# Patient Record
Sex: FEMALE | Race: BLACK OR AFRICAN AMERICAN | Employment: FULL TIME | URBAN - METROPOLITAN AREA
[De-identification: names, ages, dates, MRNs, and addresses within clinical notes are randomized per-mention and may not be internally consistent; named-entity substitution may affect disease eponyms.]

---

## 2017-04-17 ENCOUNTER — OFFICE VISIT (OUTPATIENT)
Dept: PRIMARY CARE CLINIC | Age: 22
End: 2017-04-17

## 2017-04-17 VITALS
HEIGHT: 69 IN | BODY MASS INDEX: 38.95 KG/M2 | SYSTOLIC BLOOD PRESSURE: 118 MMHG | TEMPERATURE: 97.6 F | WEIGHT: 263 LBS | OXYGEN SATURATION: 98 % | DIASTOLIC BLOOD PRESSURE: 78 MMHG | HEART RATE: 96 BPM | RESPIRATION RATE: 12 BRPM

## 2017-04-17 DIAGNOSIS — N92.0 MENORRHAGIA WITH REGULAR CYCLE: ICD-10-CM

## 2017-04-17 DIAGNOSIS — Z00.00 PREVENTATIVE HEALTH CARE: ICD-10-CM

## 2017-04-17 DIAGNOSIS — J45.20 MILD INTERMITTENT ASTHMA WITHOUT COMPLICATION: ICD-10-CM

## 2017-04-17 DIAGNOSIS — J30.1 SEASONAL ALLERGIC RHINITIS DUE TO POLLEN: ICD-10-CM

## 2017-04-17 DIAGNOSIS — Z23 NEED FOR TDAP VACCINATION: ICD-10-CM

## 2017-04-17 DIAGNOSIS — F41.1 GENERALIZED ANXIETY DISORDER: Primary | ICD-10-CM

## 2017-04-17 DIAGNOSIS — Z23 NEED FOR PNEUMOCOCCAL VACCINATION: ICD-10-CM

## 2017-04-17 PROBLEM — L73.2 HIDRADENITIS SUPPURATIVA: Status: ACTIVE | Noted: 2017-04-17

## 2017-04-17 LAB
ALBUMIN SERPL-MCNC: 4.3 G/DL (ref 3.4–5)
ALP BLD-CCNC: 64 U/L (ref 40–129)
ALT SERPL-CCNC: 13 U/L (ref 10–40)
ANION GAP SERPL CALCULATED.3IONS-SCNC: 16 MMOL/L (ref 3–16)
AST SERPL-CCNC: 14 U/L (ref 15–37)
BASOPHILS ABSOLUTE: 0 K/UL (ref 0–0.2)
BASOPHILS RELATIVE PERCENT: 0.5 %
BILIRUB SERPL-MCNC: 0.8 MG/DL (ref 0–1)
BILIRUBIN DIRECT: <0.2 MG/DL (ref 0–0.3)
BILIRUBIN, INDIRECT: ABNORMAL MG/DL (ref 0–1)
BUN BLDV-MCNC: 9 MG/DL (ref 7–20)
CALCIUM SERPL-MCNC: 9.1 MG/DL (ref 8.3–10.6)
CHLORIDE BLD-SCNC: 105 MMOL/L (ref 99–110)
CHOLESTEROL, TOTAL: 155 MG/DL (ref 0–199)
CO2: 20 MMOL/L (ref 21–32)
CREAT SERPL-MCNC: <0.5 MG/DL (ref 0.6–1.1)
EOSINOPHILS ABSOLUTE: 0.2 K/UL (ref 0–0.6)
EOSINOPHILS RELATIVE PERCENT: 3.6 %
FERRITIN: 11.4 NG/ML (ref 15–150)
GFR AFRICAN AMERICAN: >60
GFR NON-AFRICAN AMERICAN: >60
GLUCOSE BLD-MCNC: 106 MG/DL (ref 70–99)
HCT VFR BLD CALC: 32.9 % (ref 36–48)
HDLC SERPL-MCNC: 48 MG/DL (ref 40–60)
HEMATOLOGY PATH CONSULT: NO
HEMOGLOBIN: 10 G/DL (ref 12–16)
IRON SATURATION: 6 % (ref 15–50)
IRON: 25 UG/DL (ref 37–145)
LDL CHOLESTEROL CALCULATED: 94 MG/DL
LYMPHOCYTES ABSOLUTE: 2.2 K/UL (ref 1–5.1)
LYMPHOCYTES RELATIVE PERCENT: 32.8 %
MCH RBC QN AUTO: 20.7 PG (ref 26–34)
MCHC RBC AUTO-ENTMCNC: 30.5 G/DL (ref 31–36)
MCV RBC AUTO: 67.9 FL (ref 80–100)
MONOCYTES ABSOLUTE: 0.5 K/UL (ref 0–1.3)
MONOCYTES RELATIVE PERCENT: 7.3 %
NEUTROPHILS ABSOLUTE: 3.7 K/UL (ref 1.7–7.7)
NEUTROPHILS RELATIVE PERCENT: 55.8 %
PDW BLD-RTO: 16.1 % (ref 12.4–15.4)
PHOSPHORUS: 3 MG/DL (ref 2.5–4.9)
PLATELET # BLD: 306 K/UL (ref 135–450)
PMV BLD AUTO: 8.8 FL (ref 5–10.5)
POTASSIUM SERPL-SCNC: 4.5 MMOL/L (ref 3.5–5.1)
RBC # BLD: 4.84 M/UL (ref 4–5.2)
SODIUM BLD-SCNC: 141 MMOL/L (ref 136–145)
TOTAL IRON BINDING CAPACITY: 442 UG/DL (ref 260–445)
TOTAL PROTEIN: 7.4 G/DL (ref 6.4–8.2)
TRIGL SERPL-MCNC: 64 MG/DL (ref 0–150)
TSH REFLEX: 1.88 UIU/ML (ref 0.27–4.2)
VLDLC SERPL CALC-MCNC: 13 MG/DL
WBC # BLD: 6.7 K/UL (ref 4–11)

## 2017-04-17 PROCEDURE — 90715 TDAP VACCINE 7 YRS/> IM: CPT | Performed by: INTERNAL MEDICINE

## 2017-04-17 PROCEDURE — 99385 PREV VISIT NEW AGE 18-39: CPT | Performed by: INTERNAL MEDICINE

## 2017-04-17 PROCEDURE — 90472 IMMUNIZATION ADMIN EACH ADD: CPT | Performed by: INTERNAL MEDICINE

## 2017-04-17 PROCEDURE — 90471 IMMUNIZATION ADMIN: CPT | Performed by: INTERNAL MEDICINE

## 2017-04-17 PROCEDURE — 90732 PPSV23 VACC 2 YRS+ SUBQ/IM: CPT | Performed by: INTERNAL MEDICINE

## 2017-04-17 RX ORDER — VENLAFAXINE 37.5 MG/1
37.5 TABLET ORAL DAILY
Qty: 90 TABLET | Refills: 1 | Status: SHIPPED | OUTPATIENT
Start: 2017-04-17 | End: 2017-10-22 | Stop reason: SDUPTHER

## 2017-04-17 RX ORDER — ALBUTEROL SULFATE 90 UG/1
2 AEROSOL, METERED RESPIRATORY (INHALATION) EVERY 6 HOURS PRN
Qty: 1 INHALER | Refills: 5 | Status: SHIPPED | OUTPATIENT
Start: 2017-04-17 | End: 2018-05-03

## 2017-04-17 RX ORDER — FLUTICASONE PROPIONATE 50 MCG
1 SPRAY, SUSPENSION (ML) NASAL DAILY
Qty: 1 BOTTLE | Refills: 3 | Status: SHIPPED | OUTPATIENT
Start: 2017-04-17 | End: 2017-06-19 | Stop reason: SDUPTHER

## 2017-04-17 RX ORDER — CETIRIZINE HYDROCHLORIDE 5 MG/1
5 TABLET ORAL DAILY
Qty: 30 TABLET | Refills: 3 | Status: SHIPPED | OUTPATIENT
Start: 2017-04-17 | End: 2018-05-03 | Stop reason: SDUPTHER

## 2017-04-17 RX ORDER — MONTELUKAST SODIUM 10 MG/1
10 TABLET ORAL NIGHTLY
Qty: 30 TABLET | Refills: 3 | Status: SHIPPED | OUTPATIENT
Start: 2017-04-17 | End: 2018-01-10 | Stop reason: SDUPTHER

## 2017-04-17 ASSESSMENT — PATIENT HEALTH QUESTIONNAIRE - PHQ9
2. FEELING DOWN, DEPRESSED OR HOPELESS: 0
1. LITTLE INTEREST OR PLEASURE IN DOING THINGS: 0
SUM OF ALL RESPONSES TO PHQ9 QUESTIONS 1 & 2: 0
SUM OF ALL RESPONSES TO PHQ QUESTIONS 1-9: 0

## 2017-04-17 ASSESSMENT — ENCOUNTER SYMPTOMS
CONSTIPATION: 0
NAUSEA: 0
COUGH: 1
SHORTNESS OF BREATH: 1
ABDOMINAL PAIN: 0
VOMITING: 0
DIARRHEA: 0

## 2017-04-18 ENCOUNTER — TELEPHONE (OUTPATIENT)
Dept: PRIMARY CARE CLINIC | Age: 22
End: 2017-04-18

## 2017-04-18 DIAGNOSIS — D50.0 IRON DEFICIENCY ANEMIA DUE TO CHRONIC BLOOD LOSS: Primary | ICD-10-CM

## 2017-04-18 LAB
ESTIMATED AVERAGE GLUCOSE: 119.8 MG/DL
HBA1C MFR BLD: 5.8 %
HIV-1 AND HIV-2 ANTIBODIES: NORMAL

## 2017-04-18 RX ORDER — PNV NO.95/FERROUS FUM/FOLIC AC 28MG-0.8MG
1 TABLET ORAL DAILY
Qty: 30 TABLET | Refills: 3 | Status: SHIPPED | OUTPATIENT
Start: 2017-04-18 | End: 2018-01-10 | Stop reason: SDUPTHER

## 2017-05-30 ENCOUNTER — TELEPHONE (OUTPATIENT)
Dept: PRIMARY CARE CLINIC | Age: 22
End: 2017-05-30

## 2017-05-30 ENCOUNTER — OFFICE VISIT (OUTPATIENT)
Dept: PRIMARY CARE CLINIC | Age: 22
End: 2017-05-30

## 2017-05-30 VITALS
DIASTOLIC BLOOD PRESSURE: 80 MMHG | WEIGHT: 264 LBS | OXYGEN SATURATION: 99 % | BODY MASS INDEX: 39.1 KG/M2 | HEART RATE: 72 BPM | TEMPERATURE: 98.7 F | HEIGHT: 69 IN | SYSTOLIC BLOOD PRESSURE: 132 MMHG | RESPIRATION RATE: 16 BRPM

## 2017-05-30 DIAGNOSIS — L73.2 HIDRADENITIS SUPPURATIVA: Primary | ICD-10-CM

## 2017-05-30 DIAGNOSIS — J45.30 MILD PERSISTENT ASTHMA WITHOUT COMPLICATION: ICD-10-CM

## 2017-05-30 PROCEDURE — 99213 OFFICE O/P EST LOW 20 MIN: CPT | Performed by: FAMILY MEDICINE

## 2017-05-30 RX ORDER — SULFAMETHOXAZOLE AND TRIMETHOPRIM 800; 160 MG/1; MG/1
1 TABLET ORAL 2 TIMES DAILY
Qty: 20 TABLET | Refills: 0 | Status: SHIPPED | OUTPATIENT
Start: 2017-05-30 | End: 2017-06-09

## 2018-01-10 ENCOUNTER — OFFICE VISIT (OUTPATIENT)
Dept: PRIMARY CARE CLINIC | Age: 23
End: 2018-01-10

## 2018-01-10 VITALS
SYSTOLIC BLOOD PRESSURE: 120 MMHG | HEIGHT: 69 IN | WEIGHT: 272 LBS | TEMPERATURE: 97.9 F | BODY MASS INDEX: 40.29 KG/M2 | DIASTOLIC BLOOD PRESSURE: 74 MMHG | OXYGEN SATURATION: 100 % | HEART RATE: 97 BPM

## 2018-01-10 DIAGNOSIS — D50.0 IRON DEFICIENCY ANEMIA DUE TO CHRONIC BLOOD LOSS: ICD-10-CM

## 2018-01-10 DIAGNOSIS — J30.1 CHRONIC SEASONAL ALLERGIC RHINITIS DUE TO POLLEN: ICD-10-CM

## 2018-01-10 DIAGNOSIS — Z23 FLU VACCINE NEED: ICD-10-CM

## 2018-01-10 DIAGNOSIS — Z00.00 PREVENTATIVE HEALTH CARE: ICD-10-CM

## 2018-01-10 DIAGNOSIS — Z00.00 PREVENTATIVE HEALTH CARE: Primary | ICD-10-CM

## 2018-01-10 DIAGNOSIS — F41.1 GENERALIZED ANXIETY DISORDER: ICD-10-CM

## 2018-01-10 DIAGNOSIS — Z12.4 CERVICAL CANCER SCREENING: ICD-10-CM

## 2018-01-10 DIAGNOSIS — G43.009 MIGRAINE WITHOUT AURA AND WITHOUT STATUS MIGRAINOSUS, NOT INTRACTABLE: ICD-10-CM

## 2018-01-10 LAB
ALBUMIN SERPL-MCNC: 4.3 G/DL (ref 3.4–5)
ALP BLD-CCNC: 70 U/L (ref 40–129)
ALT SERPL-CCNC: 22 U/L (ref 10–40)
ANION GAP SERPL CALCULATED.3IONS-SCNC: 12 MMOL/L (ref 3–16)
AST SERPL-CCNC: 18 U/L (ref 15–37)
BASOPHILS ABSOLUTE: 0 K/UL (ref 0–0.2)
BASOPHILS RELATIVE PERCENT: 0.4 %
BILIRUB SERPL-MCNC: 0.6 MG/DL (ref 0–1)
BILIRUBIN DIRECT: <0.2 MG/DL (ref 0–0.3)
BILIRUBIN, INDIRECT: NORMAL MG/DL (ref 0–1)
BUN BLDV-MCNC: 9 MG/DL (ref 7–20)
CALCIUM SERPL-MCNC: 9.6 MG/DL (ref 8.3–10.6)
CHLORIDE BLD-SCNC: 98 MMOL/L (ref 99–110)
CO2: 27 MMOL/L (ref 21–32)
CREAT SERPL-MCNC: 0.5 MG/DL (ref 0.6–1.1)
EOSINOPHILS ABSOLUTE: 0.2 K/UL (ref 0–0.6)
EOSINOPHILS RELATIVE PERCENT: 1.9 %
FERRITIN: 12.9 NG/ML (ref 15–150)
GFR AFRICAN AMERICAN: >60
GFR NON-AFRICAN AMERICAN: >60
GLUCOSE BLD-MCNC: 85 MG/DL (ref 70–99)
HCT VFR BLD CALC: 31.8 % (ref 36–48)
HEMATOLOGY PATH CONSULT: NO
HEMOGLOBIN: 9.5 G/DL (ref 12–16)
IRON SATURATION: 7 % (ref 15–50)
IRON: 33 UG/DL (ref 37–145)
LYMPHOCYTES ABSOLUTE: 2.4 K/UL (ref 1–5.1)
LYMPHOCYTES RELATIVE PERCENT: 27 %
MCH RBC QN AUTO: 19.3 PG (ref 26–34)
MCHC RBC AUTO-ENTMCNC: 30 G/DL (ref 31–36)
MCV RBC AUTO: 64.2 FL (ref 80–100)
MONOCYTES ABSOLUTE: 0.6 K/UL (ref 0–1.3)
MONOCYTES RELATIVE PERCENT: 7.2 %
NEUTROPHILS ABSOLUTE: 5.6 K/UL (ref 1.7–7.7)
NEUTROPHILS RELATIVE PERCENT: 63.5 %
PDW BLD-RTO: 17.6 % (ref 12.4–15.4)
PHOSPHORUS: 2.8 MG/DL (ref 2.5–4.9)
PLATELET # BLD: 348 K/UL (ref 135–450)
PMV BLD AUTO: 9.2 FL (ref 5–10.5)
POTASSIUM SERPL-SCNC: 4.3 MMOL/L (ref 3.5–5.1)
RBC # BLD: 4.95 M/UL (ref 4–5.2)
SODIUM BLD-SCNC: 137 MMOL/L (ref 136–145)
TOTAL IRON BINDING CAPACITY: 477 UG/DL (ref 260–445)
TOTAL PROTEIN: 7.8 G/DL (ref 6.4–8.2)
TSH REFLEX: 1.61 UIU/ML (ref 0.27–4.2)
WBC # BLD: 8.8 K/UL (ref 4–11)

## 2018-01-10 PROCEDURE — 99395 PREV VISIT EST AGE 18-39: CPT | Performed by: INTERNAL MEDICINE

## 2018-01-10 PROCEDURE — 90630 INFLUENZA, QUADV, 18-64 YRS, ID, PF, MICRO INJ, 0.1ML (FLUZONE QUADV, PF): CPT | Performed by: INTERNAL MEDICINE

## 2018-01-10 PROCEDURE — 90471 IMMUNIZATION ADMIN: CPT | Performed by: INTERNAL MEDICINE

## 2018-01-10 RX ORDER — MONTELUKAST SODIUM 10 MG/1
10 TABLET ORAL NIGHTLY
Qty: 30 TABLET | Refills: 3 | Status: SHIPPED | OUTPATIENT
Start: 2018-01-10 | End: 2018-05-03 | Stop reason: SDUPTHER

## 2018-01-10 RX ORDER — FLUTICASONE PROPIONATE 50 MCG
1 SPRAY, SUSPENSION (ML) NASAL DAILY
Qty: 1 BOTTLE | Refills: 5 | Status: SHIPPED | OUTPATIENT
Start: 2018-01-10 | End: 2018-05-03 | Stop reason: SDUPTHER

## 2018-01-10 RX ORDER — VENLAFAXINE HYDROCHLORIDE 37.5 MG/1
37.5 CAPSULE, EXTENDED RELEASE ORAL DAILY
Qty: 90 CAPSULE | Refills: 5 | Status: SHIPPED | OUTPATIENT
Start: 2018-01-10 | End: 2018-05-03

## 2018-01-10 RX ORDER — IBUPROFEN 800 MG/1
800 TABLET ORAL EVERY 8 HOURS PRN
Qty: 20 TABLET | Refills: 0 | Status: SHIPPED | OUTPATIENT
Start: 2018-01-10 | End: 2019-02-06

## 2018-01-10 RX ORDER — PNV NO.95/FERROUS FUM/FOLIC AC 28MG-0.8MG
1 TABLET ORAL DAILY
Qty: 90 TABLET | Refills: 3 | Status: SHIPPED | OUTPATIENT
Start: 2018-01-10 | End: 2018-05-03 | Stop reason: SDUPTHER

## 2018-01-10 RX ORDER — SUMATRIPTAN 25 MG/1
TABLET, FILM COATED ORAL
Qty: 30 TABLET | Refills: 3 | Status: SHIPPED | OUTPATIENT
Start: 2018-01-10 | End: 2018-12-12

## 2018-01-10 NOTE — PATIENT INSTRUCTIONS
Patient Education        Migraine Headache: Care Instructions  Your Care Instructions  Migraines are painful, throbbing headaches that often start on one side of the head. They may cause nausea and vomiting and make you sensitive to light, sound, or smell. Without treatment, migraines can last from 4 hours to a few days. Medicines can help prevent migraines or stop them after they have started. Your doctor can help you find which ones work best for you. Follow-up care is a key part of your treatment and safety. Be sure to make and go to all appointments, and call your doctor if you are having problems. It's also a good idea to know your test results and keep a list of the medicines you take. How can you care for yourself at home? · Do not drive if you have taken a prescription pain medicine. · Rest in a quiet, dark room until your headache is gone. Close your eyes, and try to relax or go to sleep. Don't watch TV or read. · Put a cold, moist cloth or cold pack on the painful area for 10 to 20 minutes at a time. Put a thin cloth between the cold pack and your skin. · Use a warm, moist towel or a heating pad set on low to relax tight shoulder and neck muscles. · Have someone gently massage your neck and shoulders. · Take your medicines exactly as prescribed. Call your doctor if you think you are having a problem with your medicine. You will get more details on the specific medicines your doctor prescribes. · Be careful not to take pain medicine more often than the instructions allow. You could get worse or more frequent headaches when the medicine wears off. To prevent migraines  · Keep a headache diary so you can figure out what triggers your headaches. Avoiding triggers may help you prevent headaches. Record when each headache began, how long it lasted, and what the pain was like.  (Was it throbbing, aching, stabbing, or dull?) Write down any other symptoms you had with the headache, such as nausea, flashing lights or dark spots, or sensitivity to bright light or loud noise. Note if the headache occurred near your period. List anything that might have triggered the headache. Triggers may include certain foods (chocolate, cheese, wine) or odors, smoke, bright light, stress, or lack of sleep. · If your doctor has prescribed medicine for your migraines, take it as directed. You may have medicine that you take only when you get a migraine and medicine that you take all the time to help prevent migraines. ¨ If your doctor has prescribed medicine for when you get a headache, take it at the first sign of a migraine, unless your doctor has given you other instructions. ¨ If your doctor has prescribed medicine to prevent migraines, take it exactly as prescribed. Call your doctor if you think you are having a problem with your medicine. · Find healthy ways to deal with stress. Migraines are most common during or right after stressful times. Take time to relax before and after you do something that has caused a migraine in the past.  · Try to keep your muscles relaxed by keeping good posture. Check your jaw, face, neck, and shoulder muscles for tension. Try to relax them. When you sit at a desk, change positions often. And make sure to stretch for 30 seconds each hour. · Get plenty of sleep and exercise. · Eat meals on a regular schedule. Avoid foods and drinks that often trigger migraines. These include chocolate, alcohol (especially red wine and port), aspartame, monosodium glutamate (MSG), and some additives found in foods (such as hot dogs, shaver, cold cuts, aged cheeses, and pickled foods). · Limit caffeine. Don't drink too much coffee, tea, or soda. But don't quit caffeine suddenly. That can also give you migraines. · Do not smoke or allow others to smoke around you. If you need help quitting, talk to your doctor about stop-smoking programs and medicines.  These can increase your chances of quitting for Others have them as often as several times a month. The goal of treatment is to reduce the number of migraines you have and relieve your symptoms. Even with treatment, you may continue to have migraines. You play an important role in dealing with your headaches. Work on avoiding things that seem to trigger your migraines. When you feel a headache coming on, act quickly to stop it before it gets worse. Follow-up care is a key part of your treatment and safety. Be sure to make and go to all appointments, and call your doctor if you are having problems. It's also a good idea to know your test results and keep a list of the medicines you take. How can you care for yourself at home? · Do not drive if you have taken a prescription pain medicine. · Rest in a quiet, dark room until your headache is gone. Close your eyes and try to relax or go to sleep. Do not watch TV or read. · Put a cold, moist cloth or cold pack on the painful area for 10 to 20 minutes at a time. Put a thin cloth between the cold pack and your skin. · Have someone gently massage your neck and shoulders. · Take your medicines exactly as prescribed. Call your doctor if you think you are having a problem with your medicine. You will get more details on the specific medicines your doctor prescribes. To prevent migraines  · Keep a headache diary so you can figure out what triggers your headaches. Avoiding triggers may help you prevent headaches. Record when each headache began, how long it lasted, and what the pain was like. Use words like throbbing, aching, stabbing, or dull. Write down any other symptoms you had with the headache. These may include nausea, flashing lights or dark spots, or sensitivity to bright light or loud noise. Note if the headache occurred near your period. List anything that might have triggered the headache.  Triggers may include certain foods (chocolate, cheese, wine) or odors, smoke, bright light, stress, or lack of

## 2018-01-10 NOTE — PROGRESS NOTES
Chief Complaint:   Chief Complaint   Patient presents with    Annual Exam     no problems at this time         HPI:  Flavia Morrell is a 20 y.o. female, with a history of asthma, prediabetes hidradenitis  suppurativa,  anxiety, , who presents for an annual physical examination. Weight Gain:  Pt is worried about recent weight gain. She has gained 12 lb over the past few months. She recently joined [employer]. The weight gain happened while completing her last semester in college. She is now studying for her GRE test, planning to attend graduate school in [employer]. Anxiety: Pt feels that her anxiety is currently stable. She only gets worried about major stressors now. She is able to stay calm dealing with smaller issues. Pt  wants to stay at current dose of effexor. Hidradenitis: Ms. Maria Fernanda Williamson has had multiple flares of hidradenitis since last visit. She has require for medical visits to the ED or urgent care for axillary abscess. She now has a referral to a [facility] surgeon to see what can be done for long term treatment of the condition. Anemia:  Pt is not taking iron pills. She is still having regular, but heavy periods. Pt needs a new ob gyn. Miragines: Pt is also complains of chronic right sided headaches since December. She has associated nausea and light sensitivity with the headaches. Pt also does snore at bedtime.        Vaccinations   Flu shot: wants       Past Medical History:   Diagnosis Date    Anxiety     Anxiety     Asthma     Diabetes mellitus ([address]     diet controlled, not fully diabetic       Past Surgical History:   Procedure Laterality Date    TONSILLECTOMY  1998          Family History   Problem Relation Age of Onset    Diabetes Other     Hypertension Other     Other Other      kidney disease         No Known Allergies    Social History     Social History    Marital status: Single     Spouse name: N/A    Number of children: N/A    Years of education: N/A Future  - Ferrous Sulfate (IRON) 325 (65 Fe) MG TABS; Take 1 tablet by mouth daily  Dispense: 90 tablet; Refill: 3    3. Generalized anxiety disorder    - venlafaxine (EFFEXOR XR) 37.5 MG extended release capsule; Take 1 capsule by mouth daily  Dispense: 90 capsule; Refill: 5    4. Chronic seasonal allergic rhinitis due to pollen    - fluticasone (FLONASE) 50 MCG/ACT nasal spray; 1 spray by Nasal route daily  Dispense: 1 Bottle; Refill: 5  - montelukast (SINGULAIR) 10 MG tablet; Take 1 tablet by mouth nightly  Dispense: 30 tablet; Refill: 3    5. Migraine without aura and without status migrainosus, not intractable    - ibuprofen (ADVIL;MOTRIN) 800 MG tablet; Take 1 tablet by mouth every 8 hours as needed for Pain  Dispense: 20 tablet; Refill: 0  - [facility] - Delta Flake to rule out sleep apnea   - SUMAtriptan (IMITREX) 25 MG tablet; Take one pill for headache. Take second pill if headache does not resolve in 1 hour. Max of 2 pills per day  Dispense: 30 tablet; Refill: 3    6. Cervical cancer screening    - [facility] Antwan Castle MD      7.  Flu vaccine need  -completed at today's visit  - Skip Monsivais, 18-64 YRS, ID, PF, MICRO INJ, 0.1ML (FLUZONE QUADV, PF)

## 2018-01-11 LAB
ESTIMATED AVERAGE GLUCOSE: 114 MG/DL
HBA1C MFR BLD: 5.6 %

## 2018-01-22 PROBLEM — G43.009 MIGRAINE WITHOUT AURA AND WITHOUT STATUS MIGRAINOSUS, NOT INTRACTABLE: Status: ACTIVE | Noted: 2018-01-22

## 2018-01-22 ASSESSMENT — ENCOUNTER SYMPTOMS
VOMITING: 0
ABDOMINAL PAIN: 0
NAUSEA: 0
CONSTIPATION: 0
BACK PAIN: 0
SORE THROAT: 0
SHORTNESS OF BREATH: 0
COUGH: 0
DIARRHEA: 0

## 2018-01-25 ENCOUNTER — OFFICE VISIT (OUTPATIENT)
Dept: GYNECOLOGY | Age: 23
End: 2018-01-25

## 2018-01-25 ENCOUNTER — OFFICE VISIT (OUTPATIENT)
Dept: SLEEP MEDICINE | Age: 23
End: 2018-01-25

## 2018-01-25 VITALS
DIASTOLIC BLOOD PRESSURE: 72 MMHG | BODY MASS INDEX: 40.29 KG/M2 | HEIGHT: 69 IN | WEIGHT: 272 LBS | SYSTOLIC BLOOD PRESSURE: 120 MMHG

## 2018-01-25 VITALS
DIASTOLIC BLOOD PRESSURE: 80 MMHG | OXYGEN SATURATION: 98 % | BODY MASS INDEX: 40.7 KG/M2 | RESPIRATION RATE: 24 BRPM | HEART RATE: 110 BPM | WEIGHT: 274.8 LBS | TEMPERATURE: 98.4 F | HEIGHT: 69 IN | SYSTOLIC BLOOD PRESSURE: 120 MMHG

## 2018-01-25 DIAGNOSIS — G47.33 OSA (OBSTRUCTIVE SLEEP APNEA): Primary | ICD-10-CM

## 2018-01-25 DIAGNOSIS — N92.0 MENORRHAGIA WITH REGULAR CYCLE: ICD-10-CM

## 2018-01-25 DIAGNOSIS — Z01.419 WOMEN'S ANNUAL ROUTINE GYNECOLOGICAL EXAMINATION: Primary | ICD-10-CM

## 2018-01-25 DIAGNOSIS — N76.0 BACTERIAL VAGINOSIS: Primary | ICD-10-CM

## 2018-01-25 DIAGNOSIS — B96.89 BACTERIAL VAGINOSIS: Primary | ICD-10-CM

## 2018-01-25 DIAGNOSIS — Z11.8 SCREENING FOR CHLAMYDIAL DISEASE: ICD-10-CM

## 2018-01-25 DIAGNOSIS — N94.6 DYSMENORRHEA: ICD-10-CM

## 2018-01-25 DIAGNOSIS — Z12.4 SCREENING FOR CERVICAL CANCER: ICD-10-CM

## 2018-01-25 DIAGNOSIS — Z76.89 ENCOUNTER TO ESTABLISH CARE: ICD-10-CM

## 2018-01-25 DIAGNOSIS — N89.8 VAGINAL DISCHARGE: ICD-10-CM

## 2018-01-25 LAB
BACTERIA WET PREP: ABNORMAL
CLUE CELLS: ABNORMAL
EPITHELIAL CELLS WET PREP: ABNORMAL
RBC WET PREP: ABNORMAL
SOURCE WET PREP: ABNORMAL
TRICHOMONAS PREP: ABNORMAL
WBC WET PREP: ABNORMAL
YEAST WET PREP: ABNORMAL

## 2018-01-25 PROCEDURE — 1036F TOBACCO NON-USER: CPT | Performed by: PSYCHIATRY & NEUROLOGY

## 2018-01-25 PROCEDURE — G8417 CALC BMI ABV UP PARAM F/U: HCPCS | Performed by: PSYCHIATRY & NEUROLOGY

## 2018-01-25 PROCEDURE — 99204 OFFICE O/P NEW MOD 45 MIN: CPT | Performed by: PSYCHIATRY & NEUROLOGY

## 2018-01-25 PROCEDURE — 99385 PREV VISIT NEW AGE 18-39: CPT | Performed by: NURSE PRACTITIONER

## 2018-01-25 PROCEDURE — G8427 DOCREV CUR MEDS BY ELIG CLIN: HCPCS | Performed by: PSYCHIATRY & NEUROLOGY

## 2018-01-25 PROCEDURE — G8484 FLU IMMUNIZE NO ADMIN: HCPCS | Performed by: PSYCHIATRY & NEUROLOGY

## 2018-01-25 RX ORDER — METRONIDAZOLE 7.5 MG/G
GEL VAGINAL
Qty: 1 TUBE | Refills: 0 | Status: SHIPPED | OUTPATIENT
Start: 2018-01-25 | End: 2018-05-03

## 2018-01-25 ASSESSMENT — ENCOUNTER SYMPTOMS
CHOKING: 1
EYES NEGATIVE: 1
ALLERGIC/IMMUNOLOGIC NEGATIVE: 1
GASTROINTESTINAL NEGATIVE: 1

## 2018-01-25 ASSESSMENT — SLEEP AND FATIGUE QUESTIONNAIRES
ESS TOTAL SCORE: 11
NECK CIRCUMFERENCE (INCHES): 17
HOW LIKELY ARE YOU TO NOD OFF OR FALL ASLEEP IN A CAR, WHILE STOPPED FOR A FEW MINUTES IN TRAFFIC: 0
HOW LIKELY ARE YOU TO NOD OFF OR FALL ASLEEP WHILE SITTING AND READING: 1
HOW LIKELY ARE YOU TO NOD OFF OR FALL ASLEEP WHILE SITTING AND TALKING TO SOMEONE: 0
HOW LIKELY ARE YOU TO NOD OFF OR FALL ASLEEP WHILE WATCHING TV: 2
HOW LIKELY ARE YOU TO NOD OFF OR FALL ASLEEP WHILE SITTING QUIETLY AFTER LUNCH WITHOUT ALCOHOL: 1
HOW LIKELY ARE YOU TO NOD OFF OR FALL ASLEEP WHILE LYING DOWN TO REST IN THE AFTERNOON WHEN CIRCUMSTANCES PERMIT: 3
HOW LIKELY ARE YOU TO NOD OFF OR FALL ASLEEP WHILE SITTING INACTIVE IN A PUBLIC PLACE: 2
HOW LIKELY ARE YOU TO NOD OFF OR FALL ASLEEP WHEN YOU ARE A PASSENGER IN A CAR FOR AN HOUR WITHOUT A BREAK: 2

## 2018-01-25 NOTE — PATIENT INSTRUCTIONS
Patient Education        Well Visit, Ages 25 to 48: Care Instructions  Your Care Instructions    Physical exams can help you stay healthy. Your doctor has checked your overall health and may have suggested ways to take good care of yourself. He or she also may have recommended tests. At home, you can help prevent illness with healthy eating, regular exercise, and other steps. Follow-up care is a key part of your treatment and safety. Be sure to make and go to all appointments, and call your doctor if you are having problems. It's also a good idea to know your test results and keep a list of the medicines you take. How can you care for yourself at home? · Reach and stay at a healthy weight. This will lower your risk for many problems, such as obesity, diabetes, heart disease, and high blood pressure. · Get at least 30 minutes of physical activity on most days of the week. Walking is a good choice. You also may want to do other activities, such as running, swimming, cycling, or playing tennis or team sports. Discuss any changes in your exercise program with your doctor. · Do not smoke or allow others to smoke around you. If you need help quitting, talk to your doctor about stop-smoking programs and medicines. These can increase your chances of quitting for good. · Talk to your doctor about whether you have any risk factors for sexually transmitted infections (STIs). Having one sex partner (who does not have STIs and does not have sex with anyone else) is a good way to avoid these infections. · Use birth control if you do not want to have children at this time. Talk with your doctor about the choices available and what might be best for you. · Protect your skin from too much sun. When you're outdoors from 10 a.m. to 4 p.m., stay in the shade or cover up with clothing and a hat with a wide brim. Wear sunglasses that block UV rays.  Even when it's cloudy, put broad-spectrum sunscreen (SPF 30 or higher) on any information. Patient Education        Learning About Physical Activity  What is physical activity? Physical activity is any kind of activity that gets your body moving. The types of physical activity that can help you get fit and stay healthy include:  · Aerobic or \"cardio\" activities that make your heart beat faster and make you breathe harder, such as brisk walking, riding a bike, or running. Aerobic activities strengthen your heart and lungs and build up your endurance. · Strength training activities that make your muscles work against, or \"resist,\" something, such as lifting weights or doing push-ups. These activities help tone and strengthen your muscles. · Stretches that allow you to move your joints and muscles through their full range of motion. Stretching helps you be more flexible and avoid injury. What are the benefits of physical activity? Being active is one of the best things you can do to get fit and stay healthy. It helps you to:  · Feel stronger and have more energy to do all the things you like to do. · Focus better at school or work and perform better in sports. · Feel, think, and sleep better. · Reach and stay at a healthy weight. · Lose fat and build lean muscle. · Lower your risk for serious health problems. · Keep your bones, muscles, and joints strong. Being fit lets you do more physical activity. And it lets you work out harder without as much effort. How can you make physical activity part of your life? Get at least 30 minutes of exercise on most days of the week. Walking is a good choice. You also may want to do other activities, such as running, swimming, cycling, or playing tennis or team sports. Pick activities that you like-ones that make your heart beat faster, your muscles stronger, and your muscles and joints more flexible. If you find more than one thing you like doing, do them all. You don't have to do the same thing every day.   Get your heart pumping every day. Any activity that makes your heart beat faster and keeps it at that rate for a while counts. Here are some great ways to get your heart beating faster:  · Go for a brisk walk, run, or bike ride. · Go for a hike or swim. · Go in-line skating. · Play a game of touch football, basketball, or soccer. · Ride a bike. · Play tennis or racquetball. · Climb stairs. Even some household chores can be aerobic-just do them at a faster pace. Vacuuming, raking or mowing the lawn, sweeping the garage, and washing and waxing the car all can help get your heart rate up. Strengthen your muscles during the week. You don't have to lift heavy weights or grow big, bulky muscles to get stronger. Doing a few simple activities that make your muscles work against, or \"resist,\" something can help you get stronger. For example, you can:  · Do push-ups or sit-ups, which use your own body weight as resistance. · Lift weights or dumbbells or use stretch bands at home or in a gym or community center. Stretch your muscles often. Stretching will help you as you become more active. It can help you stay flexible, loosen tight muscles, and avoid injury. It can also help improve your balance and posture and can be a great way to relax. Be sure to stretch the muscles you'll be using when you work out. It's best to warm your muscles slightly before you stretch them. Walk or do some other light aerobic activity for a few minutes, and then start stretching. When you stretch your muscles:  · Do it slowly. Stretching is not about going fast or making sudden movements. · Don't push or bounce during a stretch. · Hold each stretch for at least 15 to 30 seconds, if you can. You should feel a stretch in the muscle, but not pain. · Breathe out as you do the stretch. Then breathe in as you hold the stretch. Don't hold your breath. If you're worried about how more activity might affect your health, have a checkup before you start.  Follow any special advice your doctor gives you for getting a smart start. Where can you learn more? Go to https://chpepiceweb.[email or web address] org and sign in to your [employer] account. Enter H704 in the [facility] box to learn more about \"Learning About Physical Activity. \"     If you do not have an account, please click on the \"Sign Up Now\" link. Current as of: March 13, 2017  Content Version: 11.5  © 3963-5518 [employer]. Care instructions adapted under license by [employer]. If you have questions about a medical condition or this instruction, always ask your healthcare professional. [address] any warranty or liability for your use of this information. Patient Education        Painful Menstrual Cramps: Care Instructions  Your Care Instructions    Painful menstrual cramps are very common. Many women go to the doctor because of bad cramps when they get their period. You may have cramps in your back, thighs, and belly. You may also have diarrhea, constipation, or nausea. Some women also get dizzy. Pain medicine and home treatment can help you feel better. Follow-up care is a key part of your treatment and safety. Be sure to make and go to all appointments, and call your doctor if you are having problems. It's also a good idea to know your test results and keep a list of the medicines you take. How can you care for yourself at home? · Take anti-inflammatory medicines for pain. Ibuprofen (Advil, Motrin) and naproxen (Aleve) usually work better than aspirin. ¨ Be safe with medicines. Talk to your doctor or pharmacist before you take any of these medicines. They may not be safe if you take other medicines or have other health problems. ¨ Start taking the recommended dose of pain medicine as soon as you start to feel pain. Or you can start on the day before your period. Keep taking the medicine for as many days as you have cramps.   ¨ If anti-inflammatory medicines need to take iron with some food to avoid an upset stomach. ¨ Vitamin C (from food or pills) helps your body absorb iron. Try taking iron pills with a glass of orange juice or other citrus fruit juice. ¨ Do not take antacids or drink milk or caffeine drinks (such as coffee, tea, or cola) at the same time or within 2 hours of the time that you take your iron. They can make it hard for your body to absorb the iron. ¨ Iron pills may cause stomach problems, such as heartburn, nausea, diarrhea, constipation, and cramps. Be sure to drink plenty of fluids, and include fruits, vegetables, and fiber in your diet each day. ¨ If you forget to take an iron pill, do not take a double dose of iron the next time you take a pill. ¨ Keep iron pills out of the reach of small children. An overdose of iron can be very dangerous. When should you call for help? Call 911 anytime you think you may need emergency care. For example, call if:  ? · You passed out (lost consciousness). ?Call your doctor now or seek immediate medical care if:  ? · You have new or worse belly or pelvic pain. ? · You have severe vaginal bleeding. ? · You feel dizzy or lightheaded, or you feel like you may faint. ? Watch closely for changes in your health, and be sure to contact your doctor if:  ? · You think you may be pregnant. ? · Your bleeding gets worse. ? · You do not get better as expected. Where can you learn more? Go to https://[employer]ny.[employer]Maestro. org and sign in to your [employer] account. Enter F477 in the [facility] box to learn more about \"Heavy Menstrual Periods: Care Instructions. \"     If you do not have an account, please click on the \"Sign Up Now\" link. Current as of: October 13, 2016  Content Version: 11.5  © 8005-2011 Healthwise, Incorporated. Care instructions adapted under license by [facility] ([facility]).  If you have questions about a medical condition or this instruction, always ask your healthcare professional. [address] any warranty or liability for your use of this information. Patient Education        Pelvic Ultrasound for Women: About This Test  What is it? A pelvic ultrasound test uses sound waves to make a picture of the inside of the lower belly (pelvis). It allows your doctor to see your bladder, cervix, uterus, fallopian tubes, and ovaries. The sound waves create a picture on a video monitor. The test can be done in two ways:  · Transabdominal. A small handheld device (transducer) is passed back and forth over your lower belly. · Transvaginal. A thin, lubricated transducer is placed in your vagina. Why is this test done? A pelvic ultrasound test is done to:  · Find the cause of urinary problems. · Find out what's causing pelvic pain. · Look for causes of vaginal bleeding and menstrual problems. · Check for growths or masses like ovarian cysts or uterine fibroids. · See if a fertilized egg is growing outside the uterus. This is called a tubal pregnancy. · Confirm the stage of a pregnancy and check the baby's heartbeat. · Look for the correct placement of an intrauterine device (IUD). How can you prepare for the test?  · If you are having a transabdominal ultrasound, your doctor will ask you to drink 4 to 6 glasses of juice or water about an hour before the test. This will fill your bladder. If you can't fill your bladder, it can be filled with water through a thin, flexible tube (catheter). · If you are having both transabdominal and transvaginal ultrasounds done, you'll start with a full bladder. You will be asked to empty it before the transvaginal ultrasound. What happens before the test?  · You will need to remove any jewelry that might be in the way of the ultrasound.   · You will need to take off most of your clothes below the waist. You'll be given a gown to wear during the test.  What happens during the test?  For a transabdominal 6666-3930 Healthwise, Incorporated. Care instructions adapted under license by [facility] ([facility]). If you have questions about a medical condition or this instruction, always ask your healthcare professional. [address] any warranty or liability for your use of this information.

## 2018-01-25 NOTE — PROGRESS NOTES
yes  Exercise: no regular exercise but is active  Social History   Substance Use Topics    Smoking status: Never Smoker    Smokeless tobacco: Never Used    Alcohol use No      History   Sexual Activity    Sexual activity: Not Currently    Partners: Male     Hx of STD: no     Health Maintenance   Topic Date Due    Chlamydia screen  09/01/2015    Cervical cancer screen  03/09/2020    DTaP/Tdap/Td vaccine (2 - Td) 04/17/2027    Flu vaccine  Completed    Pneumococcal med risk  Completed    HIV screen  Completed       Assessment/Plan:  1. Encounter to establish care  2. Women's annual routine gynecological examination  India Schmitt was seen today as a new patient for an annual gynecological exam.    - Healthy diet  - Exercise  - Self breast exams    3. Screening for cervical cancer  Continue annual screenings. - PAP SMEAR    4. Screening for chlamydial disease  Annual screening completed today. Patient declines full STD work-up. Understands that if DNA probe is positive that it is recommended that she be screened for HIV, Hepatitis, and Syphilis.    - C.trachomatis N.gonorrhoeae DNA, Thin Prep    5. Dysmenorrhea  6. Menorrhagia with regular cycle  Several year H/O symptoms. Declines OCP and may not be a candidate due to migraines. Discussed starting on Micronor and patient continues to decline as she reports that she would prefer to not take medication. Patient is anemic and is likely related to heavy menses- reports taking iron. Pelvis US to rule out uterine fibroids. Possible endometriosis- if work-up is inconclusive may need scope. - Ultrasound pelvis complete transvaginal non OB; Future    7. Vaginal discharge  Moderate amount of thin, white, non-adherent, and nonmalodorous discharge noted on exam. Normal discharge vs BV. Patient is asymptomatic other than pelvic pain. Cultures pending; treatment deferred until resulted.     - Wet prep, genital  - Culture, Genital     Return in about 1 year (around

## 2018-01-25 NOTE — PATIENT INSTRUCTIONS
problems, such as a stuffy nose, caused by a cold or allergies. · Try a continuous positive airway pressure (CPAP) breathing machine if your doctor recommends it. The machine keeps your airway open when you sleep. · If CPAP does not work for you, ask your doctor if you can try other breathing machines. A bilevel positive airway pressure machine uses one type of air pressure for breathing in and another type for breathing out. Another device raises or lowers air pressure as needed while you breathe. · Talk to your doctor if:  ¨ Your nose feels dry or bleeds when you use one of these machines. You may need to increase moisture in the air. A humidifier may help. ¨ Your nose is runny or stuffy from using a breathing machine. Decongestants or a corticosteroid nasal spray may help. ¨ You are sleepy during the day and it gets in the way of the normal things you do. Do not drive when you are drowsy. When should you call for help? Watch closely for changes in your health, and be sure to contact your doctor if:  ? · You still have sleep apnea even though you have made lifestyle changes. ? · You are thinking of trying a device such as CPAP. ? · You are having problems using a CPAP or similar machine. Where can you learn more? Go to [email or web address] org and sign in to your TheCommentor account. Enter E934 in the [facility] box to learn more about \"Sleep Apnea: Care Instructions. \"     If you do not have an account, please click on the \"Sign Up Now\" link. Current as of: May 12, 2017  Content Version: 11.5  © 6639-5040 Healthwise, Incorporated. Care instructions adapted under license by [facility] ([facility]). If you have questions about a medical condition or this instruction, always ask your healthcare professional. [place] [address] any warranty or liability for your use of this information.

## 2018-01-27 LAB
GENITAL CULTURE, ROUTINE: ABNORMAL
GENITAL CULTURE, ROUTINE: ABNORMAL
ORGANISM: ABNORMAL

## 2018-01-29 ENCOUNTER — TELEPHONE (OUTPATIENT)
Dept: GYNECOLOGY | Age: 23
End: 2018-01-29

## 2018-01-30 RX ORDER — FLUCONAZOLE 150 MG/1
TABLET ORAL
Qty: 1 TABLET | Refills: 1 | Status: SHIPPED | OUTPATIENT
Start: 2018-01-30 | End: 2018-09-19

## 2018-02-02 LAB
C. TRACHOMATIS DNA,THIN PREP: NEGATIVE
N. GONORRHOEAE DNA, THIN PREP: NEGATIVE

## 2018-03-01 ENCOUNTER — HOSPITAL ENCOUNTER (OUTPATIENT)
Dept: ULTRASOUND IMAGING | Age: 23
Discharge: HOME | End: 2018-03-01
Attending: NURSE PRACTITIONER | Admitting: NURSE PRACTITIONER

## 2018-03-01 DIAGNOSIS — N94.6 DYSMENORRHEA: ICD-10-CM

## 2018-03-01 DIAGNOSIS — N92.0 MENORRHAGIA WITH REGULAR CYCLE: ICD-10-CM

## 2018-03-12 ENCOUNTER — TELEPHONE (OUTPATIENT)
Dept: GYNECOLOGY | Age: 23
End: 2018-03-12

## 2018-03-12 NOTE — TELEPHONE ENCOUNTER
Patient states that she had a pelvic US completed that had been ordered by NP. She is waiting on results. Patient had this preformed on 3/1/18. There are no notes on results from provider. Routing to NP to review.

## 2018-04-03 ENCOUNTER — HOSPITAL ENCOUNTER (OUTPATIENT)
Dept: OTHER | Age: 23
Discharge: HOME | End: 2018-04-04
Attending: PSYCHIATRY & NEUROLOGY | Admitting: PSYCHIATRY & NEUROLOGY

## 2018-04-20 DIAGNOSIS — F41.1 GENERALIZED ANXIETY DISORDER: ICD-10-CM

## 2018-04-20 RX ORDER — VENLAFAXINE HYDROCHLORIDE 37.5 MG/1
CAPSULE, EXTENDED RELEASE ORAL
Qty: 90 CAPSULE | Refills: 0 | Status: SHIPPED | OUTPATIENT
Start: 2018-04-20 | End: 2019-05-23 | Stop reason: SDUPTHER

## 2018-05-03 ENCOUNTER — OFFICE VISIT (OUTPATIENT)
Dept: PRIMARY CARE CLINIC | Age: 23
End: 2018-05-03

## 2018-05-03 VITALS
SYSTOLIC BLOOD PRESSURE: 108 MMHG | TEMPERATURE: 98 F | WEIGHT: 274 LBS | OXYGEN SATURATION: 98 % | HEIGHT: 69 IN | HEART RATE: 86 BPM | DIASTOLIC BLOOD PRESSURE: 64 MMHG | BODY MASS INDEX: 40.58 KG/M2

## 2018-05-03 DIAGNOSIS — J30.1 CHRONIC SEASONAL ALLERGIC RHINITIS DUE TO POLLEN: ICD-10-CM

## 2018-05-03 DIAGNOSIS — R00.2 PALPITATIONS: Primary | ICD-10-CM

## 2018-05-03 DIAGNOSIS — J45.20 MILD INTERMITTENT ASTHMA WITHOUT COMPLICATION: ICD-10-CM

## 2018-05-03 PROCEDURE — 99214 OFFICE O/P EST MOD 30 MIN: CPT | Performed by: INTERNAL MEDICINE

## 2018-05-03 PROCEDURE — G8427 DOCREV CUR MEDS BY ELIG CLIN: HCPCS | Performed by: INTERNAL MEDICINE

## 2018-05-03 PROCEDURE — 1036F TOBACCO NON-USER: CPT | Performed by: INTERNAL MEDICINE

## 2018-05-03 PROCEDURE — G8417 CALC BMI ABV UP PARAM F/U: HCPCS | Performed by: INTERNAL MEDICINE

## 2018-05-03 RX ORDER — CETIRIZINE HYDROCHLORIDE 10 MG/1
10 TABLET ORAL DAILY
Qty: 30 TABLET | Refills: 3 | Status: SHIPPED | OUTPATIENT
Start: 2018-05-03 | End: 2020-04-08

## 2018-05-03 RX ORDER — PNV NO.95/FERROUS FUM/FOLIC AC 28MG-0.8MG
1 TABLET ORAL DAILY
Qty: 90 TABLET | Refills: 3 | Status: SHIPPED | OUTPATIENT
Start: 2018-05-03 | End: 2018-10-24 | Stop reason: ALTCHOICE

## 2018-05-03 RX ORDER — FLUTICASONE PROPIONATE 50 MCG
1 SPRAY, SUSPENSION (ML) NASAL DAILY
Qty: 1 BOTTLE | Refills: 5 | Status: SHIPPED | OUTPATIENT
Start: 2018-05-03 | End: 2019-05-22 | Stop reason: SDUPTHER

## 2018-05-03 RX ORDER — MONTELUKAST SODIUM 10 MG/1
10 TABLET ORAL NIGHTLY
Qty: 30 TABLET | Refills: 3 | Status: SHIPPED | OUTPATIENT
Start: 2018-05-03 | End: 2018-10-24 | Stop reason: SDUPTHER

## 2018-05-03 RX ORDER — ALBUTEROL SULFATE 90 UG/1
2 AEROSOL, METERED RESPIRATORY (INHALATION) EVERY 6 HOURS PRN
Qty: 1 INHALER | Refills: 3 | Status: SHIPPED | OUTPATIENT
Start: 2018-05-03 | End: 2020-02-12 | Stop reason: SDUPTHER

## 2018-05-03 ASSESSMENT — PATIENT HEALTH QUESTIONNAIRE - PHQ9
SUM OF ALL RESPONSES TO PHQ QUESTIONS 1-9: 0
2. FEELING DOWN, DEPRESSED OR HOPELESS: 0
1. LITTLE INTEREST OR PLEASURE IN DOING THINGS: 0
SUM OF ALL RESPONSES TO PHQ9 QUESTIONS 1 & 2: 0

## 2018-05-04 ASSESSMENT — ENCOUNTER SYMPTOMS
NAUSEA: 0
ABDOMINAL PAIN: 0
COUGH: 1
DIARRHEA: 0
SHORTNESS OF BREATH: 0
WHEEZING: 0

## 2018-10-24 PROBLEM — R00.2 PALPITATIONS: Status: ACTIVE | Noted: 2018-10-24

## 2018-10-24 PROBLEM — E61.1 IRON DEFICIENCY: Status: ACTIVE | Noted: 2018-10-24

## 2018-10-24 PROBLEM — E61.1 IRON DEFICIENCY: Status: RESOLVED | Noted: 2018-10-24 | Resolved: 2018-10-24

## 2019-02-15 DIAGNOSIS — F41.1 GENERALIZED ANXIETY DISORDER: ICD-10-CM

## 2019-02-19 ENCOUNTER — TELEPHONE (OUTPATIENT)
Dept: PRIMARY CARE CLINIC | Age: 24
End: 2019-02-19

## 2019-02-19 RX ORDER — VENLAFAXINE HYDROCHLORIDE 37.5 MG/1
37.5 CAPSULE, EXTENDED RELEASE ORAL DAILY
Qty: 90 CAPSULE | Refills: 0 | Status: SHIPPED | OUTPATIENT
Start: 2019-02-19 | End: 2019-05-23 | Stop reason: SDUPTHER

## 2019-05-22 DIAGNOSIS — J30.1 CHRONIC SEASONAL ALLERGIC RHINITIS DUE TO POLLEN: ICD-10-CM

## 2019-05-23 ENCOUNTER — TELEPHONE (OUTPATIENT)
Dept: PRIMARY CARE CLINIC | Age: 24
End: 2019-05-23

## 2019-05-23 DIAGNOSIS — F41.1 GENERALIZED ANXIETY DISORDER: ICD-10-CM

## 2019-05-23 RX ORDER — VENLAFAXINE HYDROCHLORIDE 37.5 MG/1
37.5 CAPSULE, EXTENDED RELEASE ORAL DAILY
Qty: 30 CAPSULE | Refills: 0 | Status: SHIPPED | OUTPATIENT
Start: 2019-05-23 | End: 2019-05-23 | Stop reason: SDUPTHER

## 2019-05-23 NOTE — TELEPHONE ENCOUNTER
Requested Prescriptions     Pending Prescriptions Disp Refills    fluticasone (FLONASE) 50 MCG/ACT nasal spray [Pharmacy Med Name: FLUTICASONE 50MCG NASAL SP (120) RX]  0     Sig: SHAKE LIQUID AND USE 1 SPRAY IN EACH NOSTRIL DAILY   .     LOV   5/3/19  FOV    None

## 2019-05-24 RX ORDER — FLUTICASONE PROPIONATE 50 MCG
SPRAY, SUSPENSION (ML) NASAL
Qty: 1 BOTTLE | Refills: 2 | Status: SHIPPED | OUTPATIENT
Start: 2019-05-24

## 2019-09-19 PROBLEM — L02.214 ABSCESS OF GROIN, RIGHT: Status: ACTIVE | Noted: 2019-09-19

## 2019-11-07 ENCOUNTER — TELEPHONE (OUTPATIENT)
Dept: PULMONOLOGY | Age: 24
End: 2019-11-07

## 2020-02-17 PROBLEM — R73.03 PREDIABETES: Status: ACTIVE | Noted: 2020-02-17

## 2020-05-26 ENCOUNTER — TELEPHONE (OUTPATIENT)
Dept: INTERNAL MEDICINE CLINIC | Age: 25
End: 2020-05-26

## 2020-05-26 SDOH — HEALTH STABILITY: MENTAL HEALTH: HOW MANY STANDARD DRINKS CONTAINING ALCOHOL DO YOU HAVE ON A TYPICAL DAY?: 1 OR 2

## 2020-05-26 ASSESSMENT — PATIENT HEALTH QUESTIONNAIRE - PHQ9
2. FEELING DOWN, DEPRESSED OR HOPELESS: 0
SUM OF ALL RESPONSES TO PHQ QUESTIONS 1-9: 0
1. LITTLE INTEREST OR PLEASURE IN DOING THINGS: 0
SUM OF ALL RESPONSES TO PHQ9 QUESTIONS 1 & 2: 0
SUM OF ALL RESPONSES TO PHQ QUESTIONS 1-9: 0

## 2020-05-27 ENCOUNTER — VIRTUAL VISIT (OUTPATIENT)
Dept: INTERNAL MEDICINE CLINIC | Age: 25
End: 2020-05-27
Payer: COMMERCIAL

## 2020-05-27 PROBLEM — G43.009 MIGRAINE WITHOUT AURA AND WITHOUT STATUS MIGRAINOSUS, NOT INTRACTABLE: Status: RESOLVED | Noted: 2018-01-22 | Resolved: 2020-05-27

## 2020-05-27 PROCEDURE — G8417 CALC BMI ABV UP PARAM F/U: HCPCS | Performed by: NURSE PRACTITIONER

## 2020-05-27 PROCEDURE — 99213 OFFICE O/P EST LOW 20 MIN: CPT | Performed by: NURSE PRACTITIONER

## 2020-05-27 PROCEDURE — G8427 DOCREV CUR MEDS BY ELIG CLIN: HCPCS | Performed by: NURSE PRACTITIONER

## 2020-05-27 PROCEDURE — 1036F TOBACCO NON-USER: CPT | Performed by: NURSE PRACTITIONER

## 2020-05-27 RX ORDER — CLINDAMYCIN PHOSPHATE 10 MG/G
GEL TOPICAL
Qty: 60 G | Refills: 3 | Status: SHIPPED | OUTPATIENT
Start: 2020-05-27 | End: 2020-08-17 | Stop reason: SDUPTHER

## 2020-05-27 RX ORDER — CHLORHEXIDINE GLUCONATE 4 G/100ML
SOLUTION TOPICAL
Qty: 473 ML | Refills: 3 | Status: SHIPPED | OUTPATIENT
Start: 2020-05-27 | End: 2020-06-10

## 2020-05-27 RX ORDER — DOXYCYCLINE HYCLATE 100 MG
100 TABLET ORAL 2 TIMES DAILY
Qty: 60 TABLET | Refills: 1 | Status: SHIPPED | OUTPATIENT
Start: 2020-05-27 | End: 2021-04-16 | Stop reason: SDUPTHER

## 2020-05-27 ASSESSMENT — ENCOUNTER SYMPTOMS
CHEST TIGHTNESS: 0
BLOOD IN STOOL: 0
VOMITING: 0
CONSTIPATION: 0
ABDOMINAL PAIN: 0
SHORTNESS OF BREATH: 0
DIARRHEA: 0
NAUSEA: 0

## 2020-05-27 NOTE — PROGRESS NOTES
vomiting. Endocrine: Negative for polyuria. Genitourinary: Negative for dysuria. Musculoskeletal: Negative for arthralgias and myalgias. Skin: Negative for rash. Reports drainage from bilateral axilla, related to hidradenitis    Neurological: Negative for dizziness, light-headedness and headaches. Psychiatric/Behavioral: Negative for agitation, decreased concentration and sleep disturbance. The patient is not nervous/anxious. Prior to Visit Medications    Medication Sig Taking? Authorizing Provider   chlorhexidine (HIBICLENS) 4 % external liquid Apply topically daily as needed. Yes CASPER Sheppard - CNP   clindamycin (CLEOCIN-T) 1 % gel Apply topically 2 times daily. Yes Katia Carr APRAMERICA - CNP   doxycycline hyclate (VIBRA-TABS) 100 MG tablet Take 1 tablet by mouth 2 times daily Yes CASPER Sheppard - CNP   cetirizine (ZYRTEC) 10 MG tablet Take 1 tablet by mouth daily  Gurpreet Arvizu MD   montelukast (SINGULAIR) 10 MG tablet Take 1 tablet by mouth daily  Gurpreet Arvizu MD   metFORMIN (GLUCOPHAGE-XR) 500 MG extended release tablet Take 1 tablet by mouth daily (with breakfast)  Gurpreet Arvizu MD   albuterol sulfate HFA (VENTOLIN HFA) 108 (90 Base) MCG/ACT inhaler Inhale 2 puffs into the lungs every 6 hours as needed for Marta Mello MD   venlafaxine (EFFEXOR XR) 37.5 MG extended release capsule Take 1 capsule by mouth daily  Gurpreet Arvizu MD   ferrous sulfate (FE TABS) 325 (65 Fe) MG EC tablet Take 1 tablet by mouth daily (with breakfast) (take with VitC for improved absorption)  Nancy Maynard MD   fluticasone (FLONASE) 50 MCG/ACT nasal spray SHAKE LIQUID AND USE 1 SPRAY IN EACH NOSTRIL DAILY  Nancy Maynard MD   Benzoyl Peroxide [facility] AC) 10 % external wash Apply topically 2 times daily.   Henok Raymond MD       Allergies   Allergen Reactions    Seasonal        Past Medical History:   Diagnosis Date    Allergic rhinitis     Anxiety     Asthma     Onset    Other Mother         uterine fibroids    Diabetes Mother     Thyroid Disease Mother     Diabetes Father     Hypertension Father     Stroke Father 64    Diabetes Other     Hypertension Other     Other Other         kidney disease (paternal uncle)    Other Maternal Grandmother         diverticulitis    Diabetes Maternal Grandmother     Other Paternal Grandmother         bone cancer     Heart Attack Paternal Grandmother     Colon Cancer Paternal Grandfather        There were no vitals filed for this visit. Estimated body mass index is 45.04 kg/m² as calculated from the following:    Height as of 5/1/20: 5' 9\" (1.753 m). Weight as of 5/1/20: 305 lb (138.3 kg). Physical exam limited due to virtual visit. Physical Exam  Constitutional:       Appearance: Normal appearance. She is obese. HENT:      Head: Normocephalic. Neurological:      Mental Status: She is alert and oriented to person, place, and time. GCS: GCS eye subscore is 4. GCS verbal subscore is 5. GCS motor subscore is 6. Psychiatric:         Attention and Perception: Attention normal.         Mood and Affect: Mood normal.         Speech: Speech normal.         ASSESSMENT/PLAN:  1. Suppurative hidradenitis  -Take antibiotics as prescribed  - chlorhexidine (HIBICLENS) 4 % external liquid; Apply topically daily as needed. Dispense: 473 mL; Refill: 3  - Judy Dudley MD, Dermatology, [facility]  - clindamycin (CLEOCIN-T) 1 % gel; Apply topically 2 times daily. Dispense: 60 g; Refill: 3  - doxycycline hyclate (VIBRA-TABS) 100 MG tablet; Take 1 tablet by mouth 2 times daily  Dispense: 60 tablet; Refill: 1  -Make appointment with Dermatology    2. Obesity without serious comorbidity, unspecified classification, unspecified obesity type  -Make appointment with Weight Management  - [facility]  -Decrease fatty, fried, fast foods  -Decrease portion sizes    3.  Generalized anxiety disorder  -Continue current medications. 4. History of difficulty sleeping  -Probable Sleep Apnea  - Chapo Marina MD, Sleep Medicine, [facility]      No follow-ups on file. Vanessa Bo is a 21 y.o. female being evaluated by a Virtual Visit (video visit) encounter to address concerns as mentioned above. A caregiver was present when appropriate. Due to this being a TeleHealth encounter (During FIVWB-75 public health emergency), evaluation of the following organ systems was limited: Vitals/Constitutional/EENT/Resp/CV/GI//MS/Neuro/Skin/Heme-Lymph-Imm. Pursuant to the emergency declaration under the [address] [place] [address] authority and the [employer] Act, this Virtual Visit was conducted with patient's (and/or legal guardian's) consent, to reduce the patient's risk of exposure to COVID-19 and provide necessary medical care. The patient (and/or legal guardian) has also been advised to contact this office for worsening conditions or problems, and seek emergency medical treatment and/or call 911 if deemed necessary. Patient identification was verified at the start of the visit: Yes    Total time spent for this encounter: Not billed by time    Services were provided through a video synchronous discussion virtually to substitute for in-person clinic visit. Patient and provider were located at their individual homes. An electronic signature was used to authenticate this note.     --CASPER Sheppard - CNP on 5/27/2020 at 11:40 AM

## 2020-06-01 RX ORDER — FLUCONAZOLE 150 MG/1
TABLET ORAL
Qty: 2 TABLET | Refills: 0 | Status: SHIPPED | OUTPATIENT
Start: 2020-06-01 | End: 2020-08-03 | Stop reason: ALTCHOICE

## 2020-07-29 ENCOUNTER — TELEPHONE (OUTPATIENT)
Dept: INTERNAL MEDICINE CLINIC | Age: 25
End: 2020-07-29

## 2020-07-29 NOTE — TELEPHONE ENCOUNTER
Pt returned call her BP was high yesterday at her gynecologist appt. It was 140/100.  You don't have anything until Monday and I scheduled her for 4pm. Or you okay with that or do you want to squeeze her in?

## 2020-08-02 NOTE — PROGRESS NOTES
[address] [facility]  [address] [address]  Dept: [phone]       8/3/2020     Washington Flores (:  [id number])KD a 21 y.o. female, here for evaluation of the following medical concerns: This is an established patient being seen today for anxiety, sleep disturbance and obesity. She is without complaints today. HPI  Anxiety  She reports a history of anxiety, taking medication for several years with effectiveness. Denies side effects of medication.  was hospitalized for Anxiety when initially diagnosed. Reports medication is effective.     Sleep Disturbance  Patient relocated from [place] 2 months ago, PCP recommended evaluation for ALPHONSO. Patient  was unable to schedule appointment for sleep study due to COVID restrictions. She reports awakening at night with high heart rate, choking and gasping for air. Reports had  sleep study at age 11 which was negative.  was not able to keep appointment due to recent death of father. Interested in making an appointment.      Weight Management  Had appointment for Bariatric Provider in [place] for evaluation of surgery prior to relocation. States she is interested in [employer] management services. General weight loss/lifestyle modification strategies discussed (elicit support from others; identify saboteurs; non-food rewards, etc).  Goals:   -Avoid high fat and high sugar foods  -Include protein with all meals and snacks  -Avoid carbonation and caffeine  -Avoid calorie containing beverages  -Increase physical activity as tolerated   Overweight/Obesity related to lack of exercise, sedentary lifestyle, unhealthy eating habits, and unsuccessful diet attempts as evidenced by BMI.      Hidradenitis  She reports a long history of infections, I&D's, drainage from both axilla.  has infection of groin area less often. She reports having I&D and course of antibiotics.  Reports improvement in symptoms since use of Hibiclens twice weekly. Pre-Diabetes  Previously diagnosed with pre-diabetes, stopped taking Metformin. A1c 6.2% in February. Discussed the importance of preventing an increase in A1c and taking Metformin daily to assist with weight control. States she has Metformin medication at home. Lab Results   Component Value Date    CHOL 148 11/02/2018    CHOL 155 04/17/2017     Lab Results   Component Value Date    TRIG 116 11/02/2018    TRIG 64 04/17/2017     Lab Results   Component Value Date    HDL 42 11/02/2018    HDL 48 04/17/2017     Lab Results   Component Value Date    LDLCHOLESTEROL 83 11/02/2018    LDLCALC 94 04/17/2017     Lab Results   Component Value Date    LABVLDL 13 04/17/2017     Lab Results   Component Value Date    CHOLHDLRATIO 4 11/02/2018     Lab Results   Component Value Date    WBC 8.0 02/14/2020    HGB 12.4 02/14/2020    HCT 37.8 02/14/2020    MCV 77.6 (L) 02/14/2020     02/14/2020     Lab Results   Component Value Date     02/14/2020    K 4.3 02/14/2020     02/14/2020    CO2 25 02/14/2020    BUN 7 02/14/2020    CREATININE 0.64 02/14/2020    GLUCOSE 85 02/14/2020    CALCIUM 9.5 02/14/2020    PROT 7.9 02/14/2020    LABALBU 4.1 02/14/2020    BILITOT 0.8 02/14/2020    ALKPHOS 63 02/14/2020    AST 27 02/14/2020    ALT 34 02/14/2020    LABGLOM >60 01/10/2018    GFRAA >60 01/10/2018       Review of Systems   Constitutional: Negative for activity change and fever. HENT: Negative for congestion. Eyes: Negative for visual disturbance. Respiratory: Negative for chest tightness and shortness of breath. Cardiovascular: Negative for chest pain, palpitations and leg swelling. Gastrointestinal: Negative for abdominal pain, blood in stool, constipation, diarrhea, nausea and vomiting. Endocrine: Negative for polyuria. Genitourinary: Negative for dysuria and menstrual problem. Musculoskeletal: Negative for arthralgias and myalgias. Skin: Negative for rash.    Neurological: Negative for dizziness, light-headedness and headaches. Psychiatric/Behavioral: Negative for agitation, decreased concentration and sleep disturbance. The patient is not nervous/anxious. Prior to Visit Medications    Medication Sig Taking? Authorizing Provider   Benzoyl Peroxide (BENZAC AC) 10 % external wash Apply topically 2 times daily. Yes CASPER Gonzalez - CNP   cetirizine (ZYRTEC) 10 MG tablet Take 1 tablet by mouth daily Yes Ketan Acuna MD   montelukast (SINGULAIR) 10 MG tablet Take 1 tablet by mouth daily Yes Ketan Acuna MD   metFORMIN (GLUCOPHAGE-XR) 500 MG extended release tablet Take 1 tablet by mouth daily (with breakfast) Yes Ketan Acuna MD   albuterol sulfate HFA (VENTOLIN HFA) 108 (90 Base) MCG/ACT inhaler Inhale 2 puffs into the lungs every 6 hours as needed for Wheezing Yes Ketan Acuna MD   venlafaxine (EFFEXOR XR) 37.5 MG extended release capsule Take 1 capsule by mouth daily Yes Ketan Acuna MD   ferrous sulfate (FE TABS) 325 (65 Fe) MG EC tablet Take 1 tablet by mouth daily (with breakfast) (take with VitC for improved absorption) Yes Grant Siddiqui MD   fluticasone (FLONASE) 50 MCG/ACT nasal spray SHAKE LIQUID AND USE 1 SPRAY IN EACH NOSTRIL DAILY Yes Grant Siddiqui MD        Social History     Tobacco Use    Smoking status: Never Smoker    Smokeless tobacco: Never Used   Substance Use Topics    Alcohol use: Yes     Alcohol/week: 1.0 standard drinks     Types: 1 Glasses of wine per week     Frequency: 2-4 times a month     Drinks per session: 1 or 2     Binge frequency: Never     Comment: belongs to a wine-club        Vitals:    08/03/20 1603   BP: 124/84   Pulse: 96   Resp: 18   Temp: 97.3 °F (36.3 °C)   TempSrc: Temporal   SpO2: 98%   Weight: (!) 300 lb 9.6 oz (136.4 kg)     Estimated body mass index is 44.39 kg/m² as calculated from the following:    Height as of 5/1/20: 5' 9\" (1.753 m). Weight as of this encounter: 300 lb 9.6 oz (136.4 kg).     Physical

## 2020-08-03 ENCOUNTER — OFFICE VISIT (OUTPATIENT)
Dept: INTERNAL MEDICINE CLINIC | Age: 25
End: 2020-08-03
Payer: COMMERCIAL

## 2020-08-03 VITALS
TEMPERATURE: 97.3 F | BODY MASS INDEX: 44.39 KG/M2 | OXYGEN SATURATION: 98 % | WEIGHT: 293 LBS | SYSTOLIC BLOOD PRESSURE: 124 MMHG | DIASTOLIC BLOOD PRESSURE: 84 MMHG | HEART RATE: 96 BPM | RESPIRATION RATE: 18 BRPM

## 2020-08-03 PROCEDURE — 99214 OFFICE O/P EST MOD 30 MIN: CPT | Performed by: NURSE PRACTITIONER

## 2020-08-03 PROCEDURE — G8417 CALC BMI ABV UP PARAM F/U: HCPCS | Performed by: NURSE PRACTITIONER

## 2020-08-03 PROCEDURE — G8427 DOCREV CUR MEDS BY ELIG CLIN: HCPCS | Performed by: NURSE PRACTITIONER

## 2020-08-03 PROCEDURE — 1036F TOBACCO NON-USER: CPT | Performed by: NURSE PRACTITIONER

## 2020-08-03 ASSESSMENT — ENCOUNTER SYMPTOMS
CONSTIPATION: 0
SHORTNESS OF BREATH: 0
NAUSEA: 0
BLOOD IN STOOL: 0
CHEST TIGHTNESS: 0
ABDOMINAL PAIN: 0
VOMITING: 0
DIARRHEA: 0

## 2020-08-03 ASSESSMENT — PATIENT HEALTH QUESTIONNAIRE - PHQ9
SUM OF ALL RESPONSES TO PHQ QUESTIONS 1-9: 0
2. FEELING DOWN, DEPRESSED OR HOPELESS: 0
SUM OF ALL RESPONSES TO PHQ QUESTIONS 1-9: 0
SUM OF ALL RESPONSES TO PHQ9 QUESTIONS 1 & 2: 0
1. LITTLE INTEREST OR PLEASURE IN DOING THINGS: 0

## 2020-08-03 NOTE — LETTER
[address] [place] [id number]  Phone: [phone]  Fax: [phone]    CASPER Lucero CNP        August 3, 2020     Patient: Sheryle Boast   YOB: 1995   Date of Visit: 8/3/2020       To Whom It May Concern:   Too Rona was seen at my office this afternoon. It is my medical opinion that Vielka Coreas may return to work on 08/04/2020. If you have any questions or concerns, please don't hesitate to call.     Sincerely,          CASPER Lucero CNP

## 2020-08-03 NOTE — PATIENT INSTRUCTIONS
Start taking Metformin with breakfast  Make life style changes:    Exercise for at least 30 minutes 3 times weekly. Decrease fatty, fried, fast and processed foods.     Schedule after 2/14

## 2020-08-13 ENCOUNTER — TELEPHONE (OUTPATIENT)
Dept: BARIATRICS/WEIGHT MGMT | Age: 25
End: 2020-08-13

## 2020-08-13 NOTE — TELEPHONE ENCOUNTER
Patient was sent Dr. Kayla Escobar digital seminar 8/13/20 via e-mail [email or web address]     Insurance Info Received ([address] [place] [address])

## 2020-08-17 ENCOUNTER — OFFICE VISIT (OUTPATIENT)
Dept: SLEEP MEDICINE | Age: 25
End: 2020-08-17
Payer: COMMERCIAL

## 2020-08-17 VITALS
WEIGHT: 293 LBS | DIASTOLIC BLOOD PRESSURE: 82 MMHG | TEMPERATURE: 97.9 F | HEIGHT: 69 IN | RESPIRATION RATE: 16 BRPM | BODY MASS INDEX: 43.4 KG/M2 | SYSTOLIC BLOOD PRESSURE: 114 MMHG | OXYGEN SATURATION: 98 % | HEART RATE: 80 BPM

## 2020-08-17 PROCEDURE — G8427 DOCREV CUR MEDS BY ELIG CLIN: HCPCS | Performed by: PSYCHIATRY & NEUROLOGY

## 2020-08-17 PROCEDURE — G8417 CALC BMI ABV UP PARAM F/U: HCPCS | Performed by: PSYCHIATRY & NEUROLOGY

## 2020-08-17 PROCEDURE — 99214 OFFICE O/P EST MOD 30 MIN: CPT | Performed by: PSYCHIATRY & NEUROLOGY

## 2020-08-17 PROCEDURE — 1036F TOBACCO NON-USER: CPT | Performed by: PSYCHIATRY & NEUROLOGY

## 2020-08-17 RX ORDER — DOXYCYCLINE HYCLATE 100 MG/1
100 CAPSULE ORAL 2 TIMES DAILY
Qty: 20 CAPSULE | Refills: 0 | Status: SHIPPED | OUTPATIENT
Start: 2020-08-17 | End: 2020-08-20

## 2020-08-17 RX ORDER — CLINDAMYCIN PHOSPHATE 10 MG/G
GEL TOPICAL
Qty: 60 G | Refills: 3 | Status: SHIPPED | OUTPATIENT
Start: 2020-08-17 | End: 2021-04-16 | Stop reason: SDUPTHER

## 2020-08-17 ASSESSMENT — SLEEP AND FATIGUE QUESTIONNAIRES
NECK CIRCUMFERENCE (INCHES): 18.5
HOW LIKELY ARE YOU TO NOD OFF OR FALL ASLEEP WHEN YOU ARE A PASSENGER IN A CAR FOR AN HOUR WITHOUT A BREAK: 1
HOW LIKELY ARE YOU TO NOD OFF OR FALL ASLEEP WHILE SITTING QUIETLY AFTER LUNCH WITHOUT ALCOHOL: 1
HOW LIKELY ARE YOU TO NOD OFF OR FALL ASLEEP WHILE WATCHING TV: 2
HOW LIKELY ARE YOU TO NOD OFF OR FALL ASLEEP IN A CAR, WHILE STOPPED FOR A FEW MINUTES IN TRAFFIC: 0
HOW LIKELY ARE YOU TO NOD OFF OR FALL ASLEEP WHILE SITTING INACTIVE IN A PUBLIC PLACE: 0
HOW LIKELY ARE YOU TO NOD OFF OR FALL ASLEEP WHILE SITTING AND READING: 1
HOW LIKELY ARE YOU TO NOD OFF OR FALL ASLEEP WHILE SITTING AND TALKING TO SOMEONE: 0
ESS TOTAL SCORE: 8
HOW LIKELY ARE YOU TO NOD OFF OR FALL ASLEEP WHILE LYING DOWN TO REST IN THE AFTERNOON WHEN CIRCUMSTANCES PERMIT: 3

## 2020-08-17 ASSESSMENT — ENCOUNTER SYMPTOMS
CHOKING: 1
EYES NEGATIVE: 1
GASTROINTESTINAL NEGATIVE: 1
WHEEZING: 1
SORE THROAT: 1
ALLERGIC/IMMUNOLOGIC NEGATIVE: 1

## 2020-08-17 NOTE — PATIENT INSTRUCTIONS
the number of times you stop breathing or have slowed breathing. · Sleep on your side. It may stop mild apnea. If you tend to roll onto your back, sew a pocket in the back of your pajama top. Put a tennis ball into the pocket, and stitch the pocket shut. This will help keep you from sleeping on your back. · Avoid alcohol and medicines such as sleeping pills and sedatives before bed. · Do not smoke. Smoking can make sleep apnea worse. If you need help quitting, talk to your doctor about stop-smoking programs and medicines. These can increase your chances of quitting for good. · Prop up the head of your bed 4 to 6 inches by putting bricks under the legs of the bed. · Treat breathing problems, such as a stuffy nose, caused by a cold or allergies. · Try a continuous positive airway pressure (CPAP) breathing machine if your doctor recommends it. The machine keeps your airway open when you sleep. · If CPAP does not work for you, ask your doctor if you can try other breathing machines. A bilevel positive airway pressure machine uses one type of air pressure for breathing in and another type for breathing out. Another device raises or lowers air pressure as needed while you breathe. · Talk to your doctor if:  ? Your nose feels dry or bleeds when you use one of these machines. You may need to increase moisture in the air. A humidifier may help. ? Your nose is runny or stuffy from using a breathing machine. Decongestants or a corticosteroid nasal spray may help. ? You are sleepy during the day and it gets in the way of the normal things you do. Do not drive when you are drowsy. When should you call for help? Watch closely for changes in your health, and be sure to contact your doctor if:  · You still have sleep apnea even though you have made lifestyle changes. · You are thinking of trying a device such as CPAP. · You are having problems using a CPAP or similar machine. Where can you learn more?   Go to [email or web address] and sign in to your Drimkihart account. Enter N282 in the [employer] box to learn more about \"Sleep Apnea: Care Instructions. \"     If you do not have an account, please click on the \"Sign Up Now\" link. Current as of: February 24, 2020               Content Version: 12.5  © 8800-1188 [employer], Incorporated. Care instructions adapted under license by [facility] ([facility]). If you have questions about a medical condition or this instruction, always ask your healthcare professional. [address] any warranty or liability for your use of this information.

## 2020-08-17 NOTE — PROGRESS NOTES
MD KARLA Flores Board Certified in Sleep Medicine  Certified [facility]  Board Certified in Neurology [address]  [address] [place] [id number] [facility]  326 [facility]2209 [facility]  [address] [place] [facility] [address], [address]           [address]  382 [facility] [address] [phone]    Subjective:     Patient ID: Washington Flores is a 21 y.o. female. Chief Complaint   Patient presents with    Sleep Apnea       HPI:        Washington Flores is a 21 y.o. female referred by [facility]  for a sleep evaluation. She complains of snoring, snorting, choking, tossing and turning, excessive daytime sleepiness, feels sleepy during the day, take naps during the day but she denies periods of not breathing, knees buckling with laughing, completely or partially paralyzed while falling asleep or waking up, noisy environment, uncomfortable room temperature, uncomfortable bedding. Symptoms began several years ago, gradually worsening since that time. The patient's bed-partner confirmed the snoring without the stopped breathing at night  SLEEP SCHEDULE: Goes to bed around 2 AM in the weekdays and 2 AM in the weekends. It usually takes the patient 30 minutes to fall asleep. The patient gets up 0 per night to go to the bathroom. The Patient finally gets up at 9 AM during the weekdays and 11 AM in the weekends. patient wakes up with dry mouth and sometimes morning headache. . the headache usually dull headache lasts 30-60 minutes. The patient has restless sleep with frequent arousals in addition to the Patient has significant daytime sleepiness. The Patient scored Total score: 8 on [place] Sleepiness Scale ( more than 10 is indicative of daytime sleepiness)and 28 in fatigue scale ( more than 36 is indicative of daytime fatigue).  The patient takes daily nap Other Topics Concern    Not on file   Social History Narrative    Lives with mother and nephew       Prior to Admission medications    Medication Sig Start Date End Date Taking? Authorizing Provider   Benzoyl Peroxide (BENZAC AC) 10 % external wash Apply topically 2 times daily.  8/3/20  Yes CASPER Turcios CNP   cetirizine (ZYRTEC) 10 MG tablet Take 1 tablet by mouth daily 4/8/20  Yes Torri Ryder MD   montelukast (SINGULAIR) 10 MG tablet Take 1 tablet by mouth daily 4/8/20  Yes Torri Ryder MD   metFORMIN (GLUCOPHAGE-XR) 500 MG extended release tablet Take 1 tablet by mouth daily (with breakfast) 4/8/20  Yes Torri Ryder MD   albuterol sulfate HFA (VENTOLIN HFA) 108 (90 Base) MCG/ACT inhaler Inhale 2 puffs into the lungs every 6 hours as needed for Wheezing 4/7/20  Yes Torri Ryder MD   venlafaxine (EFFEXOR XR) 37.5 MG extended release capsule Take 1 capsule by mouth daily 2/12/20  Yes Torri Ryder MD   ferrous sulfate (FE TABS) 325 (65 Fe) MG EC tablet Take 1 tablet by mouth daily (with breakfast) (take with VitC for improved absorption) 7/29/19  Yes Bhavin Hendricks MD   fluticasone (FLONASE) 50 MCG/ACT nasal spray SHAKE LIQUID AND USE 1 SPRAY IN EACH NOSTRIL DAILY 5/24/19  Yes Bhavin Hendricks MD       Allergies as of 08/17/2020 - Review Complete 08/17/2020   Allergen Reaction Noted    Seasonal  10/24/2018       Patient Active Problem List   Diagnosis    Asthma    Iron deficiency anemia    Lichen simplex    Menorrhagia with regular cycle    Generalized anxiety disorder    Seasonal allergic rhinitis due to pollen    Hidradenitis    Palpitations    Abscess of groin, right    Menorrhagia    Prediabetes    Obesity       Past Medical History:   Diagnosis Date    Allergic rhinitis     Anxiety     Asthma     Hidradenitis     History of prediabetes 2012    Iron deficiency anemia     Menorrhagia     Migraine     Obesity     Prediabetes 2/17/2020       Past Surgical History: Constitutional:       Appearance: Normal appearance. HENT:      Head: Atraumatic. Nose: Nose normal.      Mouth/Throat:      Comments: Mallampati class 1, no retrognathia or hypognathia , clogged in right to airflow in right nostril, no septum deviation , crowded oropharynx with low soft palate, high arched hard palate,no tonsils enlargement. Eyes:      Extraocular Movements: Extraocular movements intact. Neck:      Musculoskeletal: Normal range of motion and neck supple. Cardiovascular:      Rate and Rhythm: Normal rate and regular rhythm. Heart sounds: Normal heart sounds. Pulmonary:      Effort: Pulmonary effort is normal.      Breath sounds: Normal breath sounds. Musculoskeletal: Normal range of motion. Skin:     General: Skin is warm. Neurological:      General: No focal deficit present. Psychiatric:         Mood and Affect: Mood normal.         Assessment:   Obstructive sleep apnea especially with snoring, snorting,  daytime sleepiness, large neck circumference,  and obesity. Diagnosis Orders   1. Obstructive sleep apnea  Baseline Diagnostic Sleep Study    Sleep Study with PAP Titration   2. Class 3 severe obesity due to excess calories without serious comorbidity with body mass index (BMI) of 40.0 to 44.9 in adult [facility])  Baseline Diagnostic Sleep Study     Plan:     Patient was counseled about the pathophysiology of obstructive sleep apnea syndrome and the methods for evaluating its presence and severity. Patient was counseled to avoid driving and other potentially hazardous circumstances if the patient is experiencing excessive sleepiness.   Treatment considerations include the use of nasal CPAP, oral dental appliance or a surgical intervention, which should be based on otolarygologic findings, In the meantime, the patient should be cautioned to avoid the use of alcohol or other depressant medications because of potential for increasing the duration and severity of apnea and cautioned regarding driving or operating and dangerous equipment if the patient is experiencing daytime sleepiness. .    Most likely the patient will benefit from the gastric sleeve surgery in treating of the obstructive sleep apnea. In 2013, in a study published in Obesity Surgery Journal  A total of 69 studies with 13,900 patients were included and revealed that all the procedures achieved profound effects on ALPHONSO, as over 75 % of patients saw at least an improvement in their sleep apnea, bariatric surgery is a definitive treatment for obstructive sleep apnea, regardless of the specific type of surgery. We discussed the proportionality between weight and AHI. With 10% weight change, the AHI has a 27% proportionate change. With 20% weight change, the AHI has a 45-50% proportionate change. The Patient accepts referral to bariatrics for further consideration of weight loss methods. Orders Placed This Encounter   Procedures    Baseline Diagnostic Sleep Study    Sleep Study with PAP Titration       Return in about 3 months (around 11/17/2020) for to review the PSG and CPAP usage, Reveiwing CPAP usage and compliance report and tro.     Ksenia Tiwari MD  Medical Director - [facility]

## 2020-08-20 ENCOUNTER — HOSPITAL ENCOUNTER (EMERGENCY)
Age: 25
Discharge: HOME | End: 2020-08-20
Payer: COMMERCIAL

## 2020-08-20 VITALS
DIASTOLIC BLOOD PRESSURE: 68 MMHG | HEIGHT: 69 IN | OXYGEN SATURATION: 100 % | HEART RATE: 99 BPM | RESPIRATION RATE: 17 BRPM | BODY MASS INDEX: 43.4 KG/M2 | SYSTOLIC BLOOD PRESSURE: 122 MMHG | TEMPERATURE: 97.9 F | WEIGHT: 293 LBS

## 2020-08-20 PROCEDURE — 2500000003 HC RX 250 WO HCPCS: Performed by: PHYSICIAN ASSISTANT

## 2020-08-20 PROCEDURE — 99282 EMERGENCY DEPT VISIT SF MDM: CPT

## 2020-08-20 PROCEDURE — 56405 I&D VULVA/PERINEAL ABSCESS: CPT

## 2020-08-20 RX ORDER — LIDOCAINE HYDROCHLORIDE AND EPINEPHRINE 10; 10 MG/ML; UG/ML
20 INJECTION, SOLUTION INFILTRATION; PERINEURAL ONCE
Status: COMPLETED | OUTPATIENT
Start: 2020-08-20 | End: 2020-08-20

## 2020-08-20 RX ORDER — SULFAMETHOXAZOLE AND TRIMETHOPRIM 800; 160 MG/1; MG/1
1 TABLET ORAL 2 TIMES DAILY
Qty: 20 TABLET | Refills: 0 | Status: SHIPPED | OUTPATIENT
Start: 2020-08-20 | End: 2020-08-30

## 2020-08-20 RX ADMIN — LIDOCAINE HYDROCHLORIDE,EPINEPHRINE BITARTRATE 20 ML: 10; .01 INJECTION, SOLUTION INFILTRATION; PERINEURAL at 06:59

## 2020-08-20 ASSESSMENT — PAIN DESCRIPTION - LOCATION: LOCATION: LABIA

## 2020-08-20 ASSESSMENT — PAIN DESCRIPTION - FREQUENCY: FREQUENCY: CONTINUOUS

## 2020-08-20 ASSESSMENT — PAIN DESCRIPTION - PROGRESSION: CLINICAL_PROGRESSION: GRADUALLY WORSENING

## 2020-08-20 ASSESSMENT — PAIN DESCRIPTION - ORIENTATION: ORIENTATION: RIGHT

## 2020-08-20 ASSESSMENT — PAIN - FUNCTIONAL ASSESSMENT: PAIN_FUNCTIONAL_ASSESSMENT: PREVENTS OR INTERFERES SOME ACTIVE ACTIVITIES AND ADLS

## 2020-08-20 ASSESSMENT — PAIN SCALES - GENERAL
PAINLEVEL_OUTOF10: 10
PAINLEVEL_OUTOF10: 10

## 2020-08-20 ASSESSMENT — PAIN DESCRIPTION - DESCRIPTORS: DESCRIPTORS: DISCOMFORT;ACHING;THROBBING

## 2020-08-20 ASSESSMENT — PAIN DESCRIPTION - ONSET: ONSET: ON-GOING

## 2020-08-20 ASSESSMENT — PAIN DESCRIPTION - PAIN TYPE: TYPE: ACUTE PAIN

## 2020-08-20 NOTE — ED PROVIDER NOTES
[address] [facility]        Pt Name: Daryle Stairs  MRN: [id number]  [place] 1995  Date of evaluation: 8/20/2020  Provider: Ananda Call PA-C  PCP: CASPER Kate CNP    Evaluation by HAJA. My supervising physician was available for consultation. Erlinda Bentley MD      CHIEF COMPLAINT       Chief Complaint   Patient presents with    Abscess     Two abscesses near labia. Patient with recurrent abscesses d/t hidradenitis. One near inner right labia is shiny and elevated, more distal is less apparent but causes patient intense pain. On Anti-bx. HISTORY OF PRESENT ILLNESS   (Location, Timing/Onset, Context/Setting, Quality, Duration, Modifying Factors, Severity, Associated Signs and Symptoms)  Note limiting factors. Daryle Stairs is a 21 y.o. female patient presented with boyfriend with complaint progressive abscess 3 days wound right groin just inside the labia. 1 more superior which is rather firm at this time. Patient with history of hidradenitis. These frequent. Currently on doxycycline x4-5 days. Not responding. Patient questions the effectiveness at this time of this product and requesting change. Discussed and will use Bactrim DS. Patient will undergo I&D procedure at this time. She consents. Denies any systemic ill complaint such as fevers, chills, chest pain or shortness of breath. Nursing Notes were all reviewed and agreed with or any disagreements were addressed in the HPI. REVIEW OF SYSTEMS    (2-9 systems for level 4, 10 or more for level 5)     Review of Systems    Positives and Pertinent negatives as per HPI. Except as noted above in the ROS, all other systems were reviewed and negative.        PAST MEDICAL HISTORY     Past Medical History:   Diagnosis Date    Allergic rhinitis     Anxiety     Asthma     Hidradenitis     History of prediabetes 2012    Iron deficiency anemia  Menorrhagia     Migraine     Obesity     Prediabetes 2/17/2020         SURGICAL HISTORY     Past Surgical History:   Procedure Laterality Date    CHOLECYSTECTOMY      TONSILLECTOMY  1998         CURRENTMEDICATIONS       Previous Medications    ALBUTEROL SULFATE HFA (VENTOLIN HFA) 108 (90 BASE) MCG/ACT INHALER    Inhale 2 puffs into the lungs every 6 hours as needed for Wheezing    BENZOYL PEROXIDE (BENZAC AC) 10 % EXTERNAL WASH    Apply topically 2 times daily. CETIRIZINE (ZYRTEC) 10 MG TABLET    Take 1 tablet by mouth daily    CLINDAMYCIN (CLEOCIN-T) 1 % GEL    Apply topically 2 times daily.     FERROUS SULFATE (FE TABS) 325 (65 FE) MG EC TABLET    Take 1 tablet by mouth daily (with breakfast) (take with VitC for improved absorption)    FLUTICASONE (FLONASE) 50 MCG/ACT NASAL SPRAY    SHAKE LIQUID AND USE 1 SPRAY IN EACH NOSTRIL DAILY    METFORMIN (GLUCOPHAGE-XR) 500 MG EXTENDED RELEASE TABLET    Take 1 tablet by mouth daily (with breakfast)    MONTELUKAST (SINGULAIR) 10 MG TABLET    Take 1 tablet by mouth daily    RIFAMPIN (RIFADIN) 300 MG CAPSULE    Take 1 capsule by mouth 2 times daily for 10 days    VENLAFAXINE (EFFEXOR XR) 37.5 MG EXTENDED RELEASE CAPSULE    Take 1 capsule by mouth daily         ALLERGIES     Seasonal    FAMILYHISTORY       Family History   Problem Relation Age of Onset    Other Mother         uterine fibroids    Diabetes Mother     Thyroid Disease Mother     Diabetes Father     Hypertension Father     Stroke Father 64    Diabetes Other     Hypertension Other     Other Other         kidney disease (paternal uncle)    Other Maternal Grandmother         diverticulitis    Diabetes Maternal Grandmother     Other Paternal Grandmother         bone cancer     Heart Attack Paternal Grandmother     Colon Cancer Paternal Grandfather           SOCIAL HISTORY       Social History     Tobacco Use    Smoking status: Never Smoker    Smokeless tobacco: Never Used   Substance Use Topics    Alcohol use: Yes     Alcohol/week: 1.0 standard drinks     Types: 1 Glasses of wine per week     Frequency: 2-4 times a month     Drinks per session: 1 or 2     Binge frequency: Never     Comment: belongs to a wine-club    Drug use: No       SCREENINGS             PHYSICAL EXAM    (up to 7 for level 4, 8 or more for level 5)     ED Triage Vitals [08/20/20 0628]   BP Temp Temp Source Pulse Resp SpO2 Height Weight   122/68 97.9 °F (36.6 °C) Oral 99 17 100 % 5' 9\" (1.753 m) (!) 304 lb 1.6 oz (137.9 kg)       Physical Exam  Vitals signs and nursing note reviewed. Constitutional:       Appearance: Normal appearance. She is well-developed. She is obese. HENT:      Head: Normocephalic and atraumatic. Right Ear: External ear normal.      Left Ear: External ear normal.   Eyes:      General: No scleral icterus. Right eye: No discharge. Left eye: No discharge. Conjunctiva/sclera: Conjunctivae normal.   Neck:      Musculoskeletal: Normal range of motion and neck supple. Cardiovascular:      Rate and Rhythm: Normal rate and regular rhythm. Heart sounds: Normal heart sounds. Pulmonary:      Effort: Pulmonary effort is normal.      Breath sounds: Normal breath sounds. Musculoskeletal: Normal range of motion. Skin:     General: Skin is warm and dry. Comments: Patient does have in the right groin area and inguinal fold a 1.5 cm indurated firm area not amenable to I&D procedure. Just inferior on the lateral aspect right labia she has numerous scars from previous procedures and does have a 2 cm tender indurated with a central fluctuant area. I will open and drain this area under her consent. Neurological:      General: No focal deficit present. Mental Status: She is alert and oriented to person, place, and time. Mental status is at baseline. Psychiatric:         Mood and Affect: Mood normal.         Behavior: Behavior normal.         Thought Content:  Thought content normal.         Judgment: Judgment normal.         DIAGNOSTIC RESULTS   LABS:    Labs Reviewed - No data to display    All other labs were within normal range or not returned as of this dictation. EKG: All EKG's are interpreted by the Emergency Department Physician in the absence of a cardiologist.  Please see their note for interpretation of EKG. RADIOLOGY:   Non-plain film images such as CT, Ultrasound and MRI are read by the radiologist. Plain radiographic images are visualized and preliminarily interpreted by the ED Provider with the below findings:        Interpretation per the Radiologist below, if available at the time of this note:    No orders to display     No results found. PROCEDURES     The patient has a 2 cm tender fluctuant abscess on the lateral aspect of the right labia adjacent inguinal crease. She also has what a bit more superior measuring 1.5 cm with a slightly fluctuant central area. She is requesting any procedure both areas. I did wipe with alcohol injected 1% lidocaine with epinephrine. Once anesthesia was noted they were draped in sterile fashion cleansed with chlorhexidine rinse with saline. I did use a #11 blade to open both wounds by at least 1 cm with cross cut. Prep material noted in both. They were explored for loculations. Culture not obtained. Packing placed. Dressing applied over the site. Patient tolerated the procedure well.     Procedures    CRITICAL CARE TIME   N/A    CONSULTS:  None      EMERGENCY DEPARTMENT COURSE and DIFFERENTIAL DIAGNOSIS/MDM:   Vitals:    Vitals:    08/20/20 0628   BP: 122/68   Pulse: 99   Resp: 17   Temp: 97.9 °F (36.6 °C)   TempSrc: Oral   SpO2: 100%   Weight: (!) 304 lb 1.6 oz (137.9 kg)   Height: 5' 9\" (1.753 m)       Patient was given the following medications:  Medications   lidocaine-EPINEPHrine 1 percent-1:[id number] injection 20 mL (has no administration in time range)           Patient presenting with long history of

## 2020-08-24 ENCOUNTER — APPOINTMENT (OUTPATIENT)
Dept: ULTRASOUND IMAGING | Age: 25
End: 2020-08-24
Payer: COMMERCIAL

## 2020-08-24 ENCOUNTER — HOSPITAL ENCOUNTER (EMERGENCY)
Age: 25
Discharge: HOME | End: 2020-08-25
Attending: EMERGENCY MEDICINE
Payer: COMMERCIAL

## 2020-08-24 VITALS
SYSTOLIC BLOOD PRESSURE: 131 MMHG | BODY MASS INDEX: 45.16 KG/M2 | TEMPERATURE: 98 F | RESPIRATION RATE: 17 BRPM | DIASTOLIC BLOOD PRESSURE: 85 MMHG | WEIGHT: 293 LBS | OXYGEN SATURATION: 99 % | HEART RATE: 94 BPM

## 2020-08-24 LAB
ABO/RH: NORMAL
ANION GAP SERPL CALCULATED.3IONS-SCNC: 14 MMOL/L (ref 3–16)
BACTERIA WET PREP: NORMAL
BASOPHILS ABSOLUTE: 0 K/UL (ref 0–0.2)
BASOPHILS RELATIVE PERCENT: 0.5 %
BILIRUBIN URINE: NEGATIVE
BLOOD, URINE: ABNORMAL
BUN BLDV-MCNC: 8 MG/DL (ref 7–20)
CALCIUM SERPL-MCNC: 9.4 MG/DL (ref 8.3–10.6)
CHLORIDE BLD-SCNC: 100 MMOL/L (ref 99–110)
CLARITY: CLEAR
CLUE CELLS: NORMAL
CO2: 22 MMOL/L (ref 21–32)
COLOR: YELLOW
CREAT SERPL-MCNC: 0.5 MG/DL (ref 0.6–1.1)
EOSINOPHILS ABSOLUTE: 0.1 K/UL (ref 0–0.6)
EOSINOPHILS RELATIVE PERCENT: 1.3 %
EPITHELIAL CELLS WET PREP: NORMAL
EPITHELIAL CELLS, UA: 7 /HPF (ref 0–5)
GFR AFRICAN AMERICAN: >60
GFR NON-AFRICAN AMERICAN: >60
GLUCOSE BLD-MCNC: 176 MG/DL (ref 70–99)
GLUCOSE URINE: NEGATIVE MG/DL
GONADOTROPIN, CHORIONIC (HCG) QUANT: 270.6 MIU/ML
HCG(URINE) PREGNANCY TEST: POSITIVE
HCT VFR BLD CALC: 39.5 % (ref 36–48)
HEMOGLOBIN: 13.3 G/DL (ref 12–16)
HYALINE CASTS: 2 /LPF (ref 0–8)
KETONES, URINE: NEGATIVE MG/DL
LEUKOCYTE ESTERASE, URINE: NEGATIVE
LYMPHOCYTES ABSOLUTE: 2.7 K/UL (ref 1–5.1)
LYMPHOCYTES RELATIVE PERCENT: 30.3 %
MCH RBC QN AUTO: 26.8 PG (ref 26–34)
MCHC RBC AUTO-ENTMCNC: 33.6 G/DL (ref 31–36)
MCV RBC AUTO: 79.8 FL (ref 80–100)
MICROSCOPIC EXAMINATION: YES
MONOCYTES ABSOLUTE: 0.6 K/UL (ref 0–1.3)
MONOCYTES RELATIVE PERCENT: 6.5 %
NEUTROPHILS ABSOLUTE: 5.5 K/UL (ref 1.7–7.7)
NEUTROPHILS RELATIVE PERCENT: 61.4 %
NITRITE, URINE: NEGATIVE
PDW BLD-RTO: 14.1 % (ref 12.4–15.4)
PH UA: 6 (ref 5–8)
PLATELET # BLD: 286 K/UL (ref 135–450)
PMV BLD AUTO: 8.4 FL (ref 5–10.5)
POTASSIUM REFLEX MAGNESIUM: 3.7 MMOL/L (ref 3.5–5.1)
PROTEIN UA: NEGATIVE MG/DL
RBC # BLD: 4.94 M/UL (ref 4–5.2)
RBC UA: 3 /HPF (ref 0–4)
RBC WET PREP: NORMAL
SODIUM BLD-SCNC: 136 MMOL/L (ref 136–145)
SOURCE WET PREP: NORMAL
SPECIFIC GRAVITY UA: 1.03 (ref 1–1.03)
TRICHOMONAS PREP: NORMAL
URINE REFLEX TO CULTURE: ABNORMAL
URINE TYPE: ABNORMAL
UROBILINOGEN, URINE: 0.2 E.U./DL
WBC # BLD: 8.9 K/UL (ref 4–11)
WBC UA: 3 /HPF (ref 0–5)
WBC WET PREP: NORMAL
YEAST WET PREP: NORMAL

## 2020-08-24 PROCEDURE — 99284 EMERGENCY DEPT VISIT MOD MDM: CPT

## 2020-08-24 PROCEDURE — 76801 OB US < 14 WKS SINGLE FETUS: CPT

## 2020-08-24 PROCEDURE — 87210 SMEAR WET MOUNT SALINE/INK: CPT

## 2020-08-24 PROCEDURE — 86900 BLOOD TYPING SEROLOGIC ABO: CPT

## 2020-08-24 PROCEDURE — 84703 CHORIONIC GONADOTROPIN ASSAY: CPT

## 2020-08-24 PROCEDURE — 87491 CHLMYD TRACH DNA AMP PROBE: CPT

## 2020-08-24 PROCEDURE — 80048 BASIC METABOLIC PNL TOTAL CA: CPT

## 2020-08-24 PROCEDURE — 84702 CHORIONIC GONADOTROPIN TEST: CPT

## 2020-08-24 PROCEDURE — 81001 URINALYSIS AUTO W/SCOPE: CPT

## 2020-08-24 PROCEDURE — 85025 COMPLETE CBC W/AUTO DIFF WBC: CPT

## 2020-08-24 PROCEDURE — 76817 TRANSVAGINAL US OBSTETRIC: CPT

## 2020-08-24 PROCEDURE — 86901 BLOOD TYPING SEROLOGIC RH(D): CPT

## 2020-08-24 PROCEDURE — 87591 N.GONORRHOEAE DNA AMP PROB: CPT

## 2020-08-24 ASSESSMENT — ENCOUNTER SYMPTOMS
EYE PAIN: 0
EYE ITCHING: 0
ABDOMINAL PAIN: 0
BACK PAIN: 0
COUGH: 0
CHEST TIGHTNESS: 0
PHOTOPHOBIA: 0
DIARRHEA: 0
EYE DISCHARGE: 0
CHOKING: 0
NAUSEA: 0
APNEA: 0
SHORTNESS OF BREATH: 0
EYE REDNESS: 0
VOMITING: 0
WHEEZING: 0
ABDOMINAL DISTENTION: 0
STRIDOR: 0

## 2020-08-24 ASSESSMENT — PAIN DESCRIPTION - LOCATION: LOCATION: ABDOMEN

## 2020-08-24 ASSESSMENT — PAIN DESCRIPTION - ORIENTATION: ORIENTATION: MID;LOWER

## 2020-08-24 ASSESSMENT — PAIN DESCRIPTION - FREQUENCY: FREQUENCY: INTERMITTENT

## 2020-08-24 ASSESSMENT — PAIN DESCRIPTION - DESCRIPTORS: DESCRIPTORS: CRAMPING

## 2020-08-24 ASSESSMENT — PAIN SCALES - GENERAL: PAINLEVEL_OUTOF10: 7

## 2020-08-24 ASSESSMENT — PAIN - FUNCTIONAL ASSESSMENT: PAIN_FUNCTIONAL_ASSESSMENT: ACTIVITIES ARE NOT PREVENTED

## 2020-08-24 ASSESSMENT — PAIN SCALES - WONG BAKER: WONGBAKER_NUMERICALRESPONSE: 6

## 2020-08-24 ASSESSMENT — PAIN DESCRIPTION - PAIN TYPE: TYPE: ACUTE PAIN

## 2020-08-25 NOTE — ED NOTES
Attempted blood draw without success in the right hand. RN notified.      Megan Leung  08/24/20 1640

## 2020-08-25 NOTE — ED TRIAGE NOTES
Pt states she had some vaginal spotting today, has missed a period and had two positive at home tests.

## 2020-08-25 NOTE — ED PROVIDER NOTES
[address] [facility]        Pt Name: Cristina Davis  MRN: [id number]  [place] 1995  Date of evaluation: 2020  Provider: BJORN Dhillon  PCP: CASPER De León CNP    Evaluation by HAJA. My supervising physician was available for consultation. CHIEF COMPLAINT       Chief Complaint   Patient presents with    Vaginal Bleeding     pt newly pregnant       HISTORY OF PRESENT ILLNESS   (Location, Timing/Onset, Context/Setting, Quality, Duration, Modifying Factors, Severity, Associated Signs and Symptoms)  Note limiting factors. Cristina Davis is a 21 y.o. female who presents to the ED for the evaluation of vaginal bleeding during pregnancy. Patient's LMP was 7/10/20. 2 days ago she began to have light vaginal spotting with abdominal cramping. Has had + urine pregnancy test at home. Denies passing any clots. No discharge. No pain currently. She is A1. The patient denies fever or chills, chest pain, shortness of breath, abdominal pain, nausea, vomiting, diarrhea. No recent travel, exposure to sick contacts, or recent antibiotics. No other acute concerns, associated symptoms or modifying factors. Nursing Notes were all reviewed and agreed with or any disagreements were addressed in the HPI. REVIEW OF SYSTEMS    (2-9 systems for level 4, 10 or more for level 5)     Review of Systems   Constitutional: Negative for chills, fatigue and fever. Eyes: Negative for pain. Respiratory: Negative for cough and shortness of breath. Cardiovascular: Negative for chest pain. Gastrointestinal: Negative for abdominal pain, diarrhea, nausea and vomiting. Genitourinary: Positive for vaginal bleeding. Negative for dysuria. Musculoskeletal: Negative for back pain, neck pain and neck stiffness. Skin: Negative for rash. Neurological: Negative for dizziness and headaches.    Psychiatric/Behavioral: Negative for confusion. Positives and Pertinent negatives as per HPI. Except as noted above in the ROS, all other systems were reviewed and negative. PAST MEDICAL HISTORY     Past Medical History:   Diagnosis Date    Allergic rhinitis     Anxiety     Asthma     Hidradenitis     History of prediabetes 2012    Iron deficiency anemia     Menorrhagia     Migraine     Obesity     Prediabetes 2/17/2020         SURGICAL HISTORY     Past Surgical History:   Procedure Laterality Date    CHOLECYSTECTOMY      TONSILLECTOMY  1998         CURRENTMEDICATIONS       Previous Medications    ALBUTEROL SULFATE HFA (VENTOLIN HFA) 108 (90 BASE) MCG/ACT INHALER    Inhale 2 puffs into the lungs every 6 hours as needed for Wheezing    BENZOYL PEROXIDE (BENZAC AC) 10 % EXTERNAL WASH    Apply topically 2 times daily. CETIRIZINE (ZYRTEC) 10 MG TABLET    Take 1 tablet by mouth daily    CLINDAMYCIN (CLEOCIN-T) 1 % GEL    Apply topically 2 times daily.     FERROUS SULFATE (FE TABS) 325 (65 FE) MG EC TABLET    Take 1 tablet by mouth daily (with breakfast) (take with VitC for improved absorption)    FLUTICASONE (FLONASE) 50 MCG/ACT NASAL SPRAY    SHAKE LIQUID AND USE 1 SPRAY IN EACH NOSTRIL DAILY    METFORMIN (GLUCOPHAGE-XR) 500 MG EXTENDED RELEASE TABLET    Take 1 tablet by mouth daily (with breakfast)    MONTELUKAST (SINGULAIR) 10 MG TABLET    Take 1 tablet by mouth daily    RIFAMPIN (RIFADIN) 300 MG CAPSULE    Take 1 capsule by mouth 2 times daily for 10 days    SULFAMETHOXAZOLE-TRIMETHOPRIM (BACTRIM DS) 800-160 MG PER TABLET    Take 1 tablet by mouth 2 times daily for 10 days    VENLAFAXINE (EFFEXOR XR) 37.5 MG EXTENDED RELEASE CAPSULE    Take 1 capsule by mouth daily         ALLERGIES     Seasonal    FAMILYHISTORY       Family History   Problem Relation Age of Onset    Other Mother         uterine fibroids    Diabetes Mother     Thyroid Disease Mother     Diabetes Father     Hypertension Father     Stroke Father 64    Diabetes Other     Hypertension Other     Other Other         kidney disease (paternal uncle)    Other Maternal Grandmother         diverticulitis    Diabetes Maternal Grandmother     Other Paternal Grandmother         bone cancer     Heart Attack Paternal Grandmother     Colon Cancer Paternal Grandfather           SOCIAL HISTORY       Social History     Tobacco Use    Smoking status: Never Smoker    Smokeless tobacco: Never Used   Substance Use Topics    Alcohol use: Yes     Alcohol/week: 1.0 standard drinks     Types: 1 Glasses of wine per week     Frequency: 2-4 times a month     Drinks per session: 1 or 2     Binge frequency: Never     Comment: belongs to a wine-club    Drug use: No       SCREENINGS             PHYSICAL EXAM    (up to 7 for level 4, 8 or more for level 5)     ED Triage Vitals   BP Temp Temp Source Pulse Resp SpO2 Height Weight   08/24/20 2045 08/24/20 2045 08/24/20 2045 08/24/20 2045 08/24/20 2045 08/24/20 2045 -- 08/24/20 2203   132/88 98.1 °F (36.7 °C) Oral 97 18 99 %  (!) 305 lb 12.5 oz (138.7 kg)       Physical Exam  Vitals signs and nursing note reviewed. Constitutional:       General: She is not in acute distress. Appearance: She is well-developed. She is not diaphoretic. HENT:      Head: Normocephalic and atraumatic. Eyes:      General:         Right eye: No discharge. Left eye: No discharge. Neck:      Musculoskeletal: Normal range of motion and neck supple. Cardiovascular:      Rate and Rhythm: Normal rate. Pulmonary:      Effort: Pulmonary effort is normal. No respiratory distress. Breath sounds: No stridor. Abdominal:      Palpations: Abdomen is soft. Tenderness: There is no abdominal tenderness. Musculoskeletal: Normal range of motion. Skin:     General: Skin is warm and dry. Coloration: Skin is not pale. Neurological:      Mental Status: She is alert and oriented to person, place, and time.       Comments: No gross facial Performed at:  [address]   Phone [phone]       All other labs were within normal range or not returned as of this dictation. EKG: All EKG's are interpreted by the Emergency Department Physician in the absence of a cardiologist.  Please see their note for interpretation of EKG. RADIOLOGY:   Non-plain film images such as CT, Ultrasound and MRI are read by the radiologist. Plain radiographic images are visualized and preliminarily interpreted by the ED Provider with the below findings:        Interpretation per the Radiologist below, if available at the time of this note:    US OB LESS THAN 14 [address]   Final Result   No evidence of either an intrauterine or extrauterine pregnancy. Findings in   correlation with clinical history suggest spontaneous . There is a small hypoechoic structure in or directly adjacent to the   endometrium measuring up to 1.1 cm. This does not appear to represent a   normal/early gestational sac. Residual of a blighted ovum is a possibility. Serial beta hCGs and if clinically indicated, follow-up ultrasound would be   helpful. US OB TRANSVAGINAL   Final Result   No evidence of either an intrauterine or extrauterine pregnancy. Findings in   correlation with clinical history suggest spontaneous . There is a small hypoechoic structure in or directly adjacent to the   endometrium measuring up to 1.1 cm. This does not appear to represent a   normal/early gestational sac. Residual of a blighted ovum is a possibility. Serial beta hCGs and if clinically indicated, follow-up ultrasound would be   helpful. No results found.         PROCEDURES   Unless otherwise noted below, none     Procedures    CRITICAL CARE TIME   N/A    CONSULTS:  None      EMERGENCY DEPARTMENT COURSE and DIFFERENTIAL DIAGNOSIS/MDM:   Vitals:    Vitals:    20 PM      PATIENT REFERREDTO:   Nando Monroe, [address] [place] [address]  [phone]    Go in 2 days  ED follow up      DISCHARGE MEDICATIONS:  New Prescriptions    No medications on file       DISCONTINUED MEDICATIONS:  Discontinued Medications    No medications on file              (Please note that portions of this note were completed with a voice recognition program.  Efforts were made to edit the dictations but occasionally words are mis-transcribed.)    Ko Lagos (electronically signed)           BJORN Lagos  08/24/20 5073

## 2020-08-25 NOTE — ED PROVIDER NOTES
Attending Supervising Physicians Attestation Statement  I was present with the Mid Level Provider during the history and exam. I discussed the findings and plans with the Mid Level Provider and agree as documented in her note     Jane      Pt Name: Jessica Miguel  MRN: [id number]  [place] 1995  Date of evaluation: 8/24/2020  Provider: Angela Fonseca MD    CHIEF COMPLAINT       Chief Complaint   Patient presents with    Vaginal Bleeding     pt newly pregnant       HISTORY OF PRESENT ILLNESS    Jessica Miguel is a 21 y.o. female who presents to the emergency department with vaginal bleeding. 2 days ago she began to have light vaginal spotting with abdominal cramping 2/10 crampy in nature. Has missed 2 periods. Spotting no significant bleeding. Never happened before. No other associated symptoms. Nursing Notes were reviewed. Including nursing noted for FM, Surgical History, Past Medical History, Social History, vitals, and allergies; agree with all. REVIEW OF SYSTEMS       Review of Systems   Constitutional: Negative for activity change, appetite change, chills, diaphoresis, fatigue, fever and unexpected weight change. HENT: Negative for congestion, dental problem, drooling and ear discharge. Eyes: Negative for photophobia, pain, discharge, redness and itching. Respiratory: Negative for apnea, cough, choking, chest tightness, shortness of breath, wheezing and stridor. Cardiovascular: Negative for chest pain and leg swelling. Gastrointestinal: Negative for abdominal distention. Endocrine: Negative for polyphagia and polyuria. Genitourinary: Positive for vaginal bleeding. Negative for vaginal discharge and vaginal pain. Musculoskeletal: Negative for arthralgias. Neurological: Negative for dizziness, facial asymmetry and headaches. Hematological: Negative for adenopathy. Does not bruise/bleed easily.    Psychiatric/Behavioral: Negative for agitation, behavioral problems, confusion, decreased concentration, dysphoric mood, hallucinations, self-injury, sleep disturbance and suicidal ideas. The patient is not nervous/anxious and is not hyperactive. Except as noted above the remainder of the review of systems was reviewed and negative. PAST MEDICAL HISTORY     Past Medical History:   Diagnosis Date    Allergic rhinitis     Anxiety     Asthma     Hidradenitis     History of prediabetes 2012    Iron deficiency anemia     Menorrhagia     Migraine     Obesity     Prediabetes 2/17/2020       SURGICAL HISTORY       Past Surgical History:   Procedure Laterality Date    CHOLECYSTECTOMY      TONSILLECTOMY  1998       CURRENT MEDICATIONS       Previous Medications    ALBUTEROL SULFATE HFA (VENTOLIN HFA) 108 (90 BASE) MCG/ACT INHALER    Inhale 2 puffs into the lungs every 6 hours as needed for Wheezing    BENZOYL PEROXIDE (BENZAC AC) 10 % EXTERNAL WASH    Apply topically 2 times daily. CETIRIZINE (ZYRTEC) 10 MG TABLET    Take 1 tablet by mouth daily    CLINDAMYCIN (CLEOCIN-T) 1 % GEL    Apply topically 2 times daily.     FERROUS SULFATE (FE TABS) 325 (65 FE) MG EC TABLET    Take 1 tablet by mouth daily (with breakfast) (take with VitC for improved absorption)    FLUTICASONE (FLONASE) 50 MCG/ACT NASAL SPRAY    SHAKE LIQUID AND USE 1 SPRAY IN EACH NOSTRIL DAILY    METFORMIN (GLUCOPHAGE-XR) 500 MG EXTENDED RELEASE TABLET    Take 1 tablet by mouth daily (with breakfast)    MONTELUKAST (SINGULAIR) 10 MG TABLET    Take 1 tablet by mouth daily    RIFAMPIN (RIFADIN) 300 MG CAPSULE    Take 1 capsule by mouth 2 times daily for 10 days    SULFAMETHOXAZOLE-TRIMETHOPRIM (BACTRIM DS) 800-160 MG PER TABLET    Take 1 tablet by mouth 2 times daily for 10 days    VENLAFAXINE (EFFEXOR XR) 37.5 MG EXTENDED RELEASE CAPSULE    Take 1 capsule by mouth daily       ALLERGIES     Seasonal    FAMILY HISTORY        Family History   Problem Relation Age of Onset    Other Mother uterine fibroids    Diabetes Mother     Thyroid Disease Mother     Diabetes Father     Hypertension Father     Stroke Father 64    Diabetes Other     Hypertension Other     Other Other         kidney disease (paternal uncle)    Other Maternal Grandmother         diverticulitis    Diabetes Maternal Grandmother     Other Paternal Grandmother         bone cancer     Heart Attack Paternal Grandmother     Colon Cancer Paternal Grandfather        SOCIAL HISTORY       Social History     Socioeconomic History    Marital status: Single     Spouse name: None    Number of children: 0    Years of education: None    Highest education level: None   Occupational History    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     Occupation:    Social Needs    Financial resource strain: Somewhat hard    Food insecurity     Worry: Never true     Inability: Never true    Transportation needs     Medical: No     Non-medical: No   Tobacco Use    Smoking status: Never Smoker    Smokeless tobacco: Never Used   Substance and Sexual Activity    Alcohol use:  Yes     Alcohol/week: 1.0 standard drinks     Types: 1 Glasses of wine per week     Frequency: 2-4 times a month     Drinks per session: 1 or 2     Binge frequency: Never     Comment: belongs to a wine-club    Drug use: No    Sexual activity: Yes     Partners: Male   Lifestyle    Physical activity     Days per week: 0 days     Minutes per session: 0 min    Stress: Not at all   Relationships    Social connections     Talks on phone: None     Gets together: None     Attends Holiness service: None     Active member of club or organization: None     Attends meetings of clubs or organizations: None     Relationship status: None    Intimate partner violence     Fear of current or ex partner: None     Emotionally abused: None     Physically abused: None     Forced sexual activity: None   Other normal.         DIAGNOSTIC RESULTS     EKG: All EKG's are interpreted by the Emergency Department Physician who either signs or Co-signs this chart in the absence of acardiologist.    None    RADIOLOGY:   Non-plain film images such as CT, Ultrasoundand MRI are read by the radiologist. Plain radiographic images are visualized and preliminarily interpreted by the emergency physician with the below findings:    US shows   Impression    No evidence of either an intrauterine or extrauterine pregnancy.  Findings in    correlation with clinical history suggest spontaneous .         There is a small hypoechoic structure in or directly adjacent to the    endometrium measuring up to 1.1 cm.  This does not appear to represent a    normal/early gestational sac.  Residual of a blighted ovum is a possibility. Serial beta hCGs and if clinically indicated, follow-up ultrasound would be    helpful.       ED BEDSIDE ULTRASOUND:   Performed by ED Physician - none    LABS:  Labs Reviewed   CBC WITH AUTO DIFFERENTIAL - Abnormal; Notable for the following components:       Result Value    MCV 79.8 (*)     All other components within normal limits    Narrative:     Performed at:  [address] [place] [address]   Phone [phone]   BASIC METABOLIC PANEL W/ REFLEX TO MG FOR LOW K - Abnormal; Notable for the following components:    Glucose 176 (*)     CREATININE 0.5 (*)     All other components within normal limits    Narrative:     Performed at:  [facility]  [address] [facility] [place] [address]   Phone [phone]   URINE RT REFLEX TO CULTURE - Abnormal; Notable for the following components:    Blood, Urine MODERATE (*)     All other components within normal limits    Narrative:     Performed at:  [place] [address] [address] [facility] [place] [address]   Phone [phone]   MICROSCOPIC URINALYSIS - Abnormal; Notable for the following components:    Epithelial Cells, UA 7 (*)     All other components within normal limits    Narrative:     Performed at:  [facility]  [address] [place] [address]   Phone [phone]   C. TRACHOMATIS N.GONORRHOEAE DNA   WET PREP, GENITAL   HCG, QUANTITATIVE, PREGNANCY    Narrative:     Performed at:  [facility]  [address] [facility] [address]   Phone [phone]   PREGNANCY, URINE    Narrative:     Performed at:  [facility]  [address] [facility] [place] [address]   Phone [phone]   ABO/RH    Narrative:     Performed at:  [facility]  [address] [place] [address]   Phone [phone]       All other labs were withinnormal range or not returned as of this dictation. EMERGENCY DEPARTMENT COURSE and DIFFERENTIAL DIAGNOSIS/MDM:     PMH, Surgical Hx, FH, Social Hx reviewed by myself (ETOH usage, Tobacco usage, Drug usage reviewed by myself, no pertinent Hx)- No Pertinent Hx     Old records were reviewed by me    Blighted ovum- Most likely incidental- Close OB follow up    Early pregnancy- Close OB follow up    Has appointment in 2 days for serial HCG    I estimate there is LOW risk for Sepsis, MI, Stroke, Tamponade, PTX, Toxicity or other life threatening etiology thus I consider the discharge disposition reasonable. The patient is at low risk for mortality based on demographic, history and clinical factors. Given the best available information and clinical assessment, I estimate the risk of hospitalization to be greater than risk of treatment at home. I have explained to the patient that the risk could rapidly change, given precautions for return and instructions. Explained to patient that the risk for mortality is low based on demographic, history and clinical factors.      I discussed with patient the results of evaluation in the ED, diagnosis, care, and prognosis. The plan is to discharge to home. Patient is in agreement with plan and questions have been answered. I also discussed with patient the reasons which may require a return visit and the importance of follow-up care. The patient is well-appearing, nontoxic, and improved at the time of discharge. Patient agrees to call to arrange follow-up care as directed. Patient understands to return immediately for worsening/change in symptoms. CRITICAL CARE TIME   Total Critical Caretime was 23 minutes, excluding separately reportable procedures. There was a high probability of clinically significant/life threatening deterioration in the patient's condition which required my urgent intervention. PROCEDURES:  Unlessotherwise noted below, none    FINAL IMPRESSION      1.  Vaginal bleeding in pregnancy          DISPOSITION/PLAN   DISPOSITION      PATIENT REFERRED TO:  Nando Monroe MD  [facility] [address] [facility] [address]  [phone]    Go in 2 days  ED follow up      DISCHARGE MEDICATIONS:  New Prescriptions    No medications on file          (Please note that portions ofthis note were completed with a voice recognition program.  Efforts were made to edit the dictations but occasionally words are mis-transcribed.)    Shira Willis MD(electronically signed)  Attending Emergency Physician            Shira Willis MD  08/24/20 1096

## 2020-08-26 ENCOUNTER — TELEPHONE (OUTPATIENT)
Dept: BARIATRICS/WEIGHT MGMT | Age: 25
End: 2020-08-26

## 2020-08-26 LAB
ABO/RH: NORMAL
ANTIBODY SCREEN: NORMAL
C TRACH DNA GENITAL QL NAA+PROBE: NEGATIVE
GONADOTROPIN, CHORIONIC (HCG) QUANT: 285.2 MIU/ML
HEPATITIS C ANTIBODY INTERPRETATION: NORMAL
N. GONORRHOEAE DNA: NEGATIVE

## 2020-08-26 NOTE — TELEPHONE ENCOUNTER
Seminar Date: online 8/13/20    Presenter: christianne    Insurance Type: [employer]     BLANCA Covered:y    Medically Supervised Diet Req: Y    Length of time: 6mo     Has patient had prev bariatric or gastric surgeries :     Is patient's BMI lower than 35 :      If yes, BMI :    Does patient have dx of diabetes :    *If previous bariatric or gastric surgeries, please do not schedule until speaking with the provider*     appt 10/6/20

## 2020-08-27 LAB
BASOPHILS ABSOLUTE: 0 K/UL (ref 0–0.2)
BASOPHILS RELATIVE PERCENT: 0.2 %
EOSINOPHILS ABSOLUTE: 0.1 K/UL (ref 0–0.6)
EOSINOPHILS RELATIVE PERCENT: 1.3 %
HCT VFR BLD CALC: 41 % (ref 36–48)
HEMOGLOBIN: 13.5 G/DL (ref 12–16)
HEPATITIS B SURFACE ANTIGEN INTERPRETATION: NORMAL
HIV AG/AB: NORMAL
HIV ANTIGEN: NORMAL
HIV-1 ANTIBODY: NORMAL
HIV-2 AB: NORMAL
LYMPHOCYTES ABSOLUTE: 2.2 K/UL (ref 1–5.1)
LYMPHOCYTES RELATIVE PERCENT: 28.9 %
MCH RBC QN AUTO: 26.6 PG (ref 26–34)
MCHC RBC AUTO-ENTMCNC: 33 G/DL (ref 31–36)
MCV RBC AUTO: 80.7 FL (ref 80–100)
MONOCYTES ABSOLUTE: 0.6 K/UL (ref 0–1.3)
MONOCYTES RELATIVE PERCENT: 7.1 %
NEUTROPHILS ABSOLUTE: 4.8 K/UL (ref 1.7–7.7)
NEUTROPHILS RELATIVE PERCENT: 62.5 %
PDW BLD-RTO: 13.8 % (ref 12.4–15.4)
PLATELET # BLD: 265 K/UL (ref 135–450)
PMV BLD AUTO: 8.9 FL (ref 5–10.5)
RBC # BLD: 5.08 M/UL (ref 4–5.2)
RUBELLA ANTIBODY IGG: 115.4 IU/ML
TOTAL SYPHILLIS IGG/IGM: NORMAL
WBC # BLD: 7.7 K/UL (ref 4–11)

## 2020-08-28 LAB
GONADOTROPIN, CHORIONIC (HCG) QUANT: 288.5 MIU/ML
URINE CULTURE, ROUTINE: NORMAL

## 2020-08-31 LAB — GONADOTROPIN, CHORIONIC (HCG) QUANT: 41 MIU/ML

## 2020-09-04 ENCOUNTER — OFFICE VISIT (OUTPATIENT)
Dept: PRIMARY CARE CLINIC | Age: 25
End: 2020-09-04
Payer: COMMERCIAL

## 2020-09-04 PROCEDURE — G8417 CALC BMI ABV UP PARAM F/U: HCPCS | Performed by: NURSE PRACTITIONER

## 2020-09-04 PROCEDURE — G8428 CUR MEDS NOT DOCUMENT: HCPCS | Performed by: NURSE PRACTITIONER

## 2020-09-04 PROCEDURE — 99211 OFF/OP EST MAY X REQ PHY/QHP: CPT | Performed by: NURSE PRACTITIONER

## 2020-09-08 LAB — SARS-COV-2, NAA: NOT DETECTED

## 2020-09-24 LAB
HEPATITIS B SURFACE ANTIGEN INTERPRETATION: NORMAL
HEPATITIS C ANTIBODY INTERPRETATION: NORMAL

## 2020-09-25 ENCOUNTER — OFFICE VISIT (OUTPATIENT)
Dept: PRIMARY CARE CLINIC | Age: 25
End: 2020-09-25
Payer: COMMERCIAL

## 2020-09-25 LAB
HIV AG/AB: NORMAL
HIV ANTIGEN: NORMAL
HIV-1 ANTIBODY: NORMAL
HIV-2 AB: NORMAL
TOTAL SYPHILLIS IGG/IGM: NORMAL

## 2020-09-25 PROCEDURE — 99211 OFF/OP EST MAY X REQ PHY/QHP: CPT | Performed by: NURSE PRACTITIONER

## 2020-09-25 PROCEDURE — G8417 CALC BMI ABV UP PARAM F/U: HCPCS | Performed by: NURSE PRACTITIONER

## 2020-09-25 PROCEDURE — G8428 CUR MEDS NOT DOCUMENT: HCPCS | Performed by: NURSE PRACTITIONER

## 2020-09-25 NOTE — PROGRESS NOTES
Patient presented to [facility] drive up clinic for preop testing. Patient was swabbed and given information advising them to remain isolated until procedure date.

## 2020-09-26 LAB — SARS-COV-2, NAA: NOT DETECTED

## 2020-10-01 ENCOUNTER — HOSPITAL ENCOUNTER (OUTPATIENT)
Dept: SLEEP CENTER | Age: 25
Discharge: HOME | End: 2020-10-01
Payer: COMMERCIAL

## 2020-10-01 PROCEDURE — 95810 POLYSOM 6/> YRS 4/> PARAM: CPT

## 2020-10-05 ENCOUNTER — TELEPHONE (OUTPATIENT)
Dept: BARIATRICS/WEIGHT MGMT | Age: 25
End: 2020-10-05

## 2020-10-05 NOTE — TELEPHONE ENCOUNTER
Called as a new pt courtesy call - spoke w patient. Did receive paperwork - told patient to have new pt paperwork completely filled out, insurance card, and id and to arrive at appt time. If they didn't have the paperwork filled out and arrive on time may be rescheduled. Told ff location, arrive half hour before, wear a mask, no visitors.

## 2020-10-06 ENCOUNTER — TELEPHONE (OUTPATIENT)
Dept: SLEEP MEDICINE | Age: 25
End: 2020-10-06

## 2020-10-06 PROCEDURE — 95810 POLYSOM 6/> YRS 4/> PARAM: CPT | Performed by: PSYCHIATRY & NEUROLOGY

## 2020-10-06 NOTE — TELEPHONE ENCOUNTER
Sleep study showed mild ALPHONSO. AHI was 5.8  per hr. And O2 Desaturations to 90%. Dr Dede Dexter titration.       Contacted and informed of results and provided sleep lab number

## 2020-10-26 ENCOUNTER — TELEPHONE (OUTPATIENT)
Dept: BARIATRICS/WEIGHT MGMT | Age: 25
End: 2020-10-26

## 2020-10-26 NOTE — TELEPHONE ENCOUNTER
Called as a new pt courtesy call - spoke w patient. Did receive paperwork - told patient to have new pt paperwork completely filled out, insurance card, and id and to arrive at appt time. If they didn't have the paperwork filled out and arrive on time may be rescheduled.  Told ff location and arrive half hour before

## 2020-10-27 ENCOUNTER — OFFICE VISIT (OUTPATIENT)
Dept: BARIATRICS/WEIGHT MGMT | Age: 25
End: 2020-10-27
Payer: COMMERCIAL

## 2020-10-27 VITALS
RESPIRATION RATE: 18 BRPM | WEIGHT: 293 LBS | BODY MASS INDEX: 44.41 KG/M2 | HEART RATE: 94 BPM | SYSTOLIC BLOOD PRESSURE: 130 MMHG | HEIGHT: 68 IN | OXYGEN SATURATION: 98 % | DIASTOLIC BLOOD PRESSURE: 84 MMHG

## 2020-10-27 PROBLEM — Z01.818 PRE-OPERATIVE CLEARANCE: Status: ACTIVE | Noted: 2020-10-27

## 2020-10-27 PROBLEM — E66.01 MORBID OBESITY WITH BMI OF 45.0-49.9, ADULT (HCC): Status: ACTIVE | Noted: 2020-10-27

## 2020-10-27 PROBLEM — R14.0 POSTPRANDIAL ABDOMINAL BLOATING: Status: ACTIVE | Noted: 2020-10-27

## 2020-10-27 PROCEDURE — G8417 CALC BMI ABV UP PARAM F/U: HCPCS | Performed by: SURGERY

## 2020-10-27 PROCEDURE — G8427 DOCREV CUR MEDS BY ELIG CLIN: HCPCS | Performed by: SURGERY

## 2020-10-27 PROCEDURE — 1036F TOBACCO NON-USER: CPT | Performed by: SURGERY

## 2020-10-27 PROCEDURE — 99205 OFFICE O/P NEW HI 60 MIN: CPT | Performed by: SURGERY

## 2020-10-27 PROCEDURE — G8484 FLU IMMUNIZE NO ADMIN: HCPCS | Performed by: SURGERY

## 2020-10-27 NOTE — PROGRESS NOTES
[facility]) Physicians   [employer] [facility] [employer]  Benito Araujo MD, [address] [facility], [address] [place] [address]    [address] [id number] . Phone: [phone]  Fax: [phone]       Chief Complaint   Patient presents with    Bariatric, Initial Visit     NP surg [facility]            HPI:    Bhargavi Munroe is a very pleasant 31 y.o. obese female ,   Body mass index is 45.77 kg/m². And multiple medical problems who is presenting for weight loss surgery evaluation and consultation by Dr. Jeffery Reyes. Patient has been struggling for several years now with obesity. Patient feels the weight is an obstacle to achieve and perform things in daily living as well risk on health. Tries to diet, and exercise but can't keep the weight off. Patient tried Nutritional Counseling, Scar Restriction and LC. Patient has not participated in meal replacement/liquid diets. Patient has participated in weight loss medications - PCP started her on metformin and other regimens, but with no sustainable weight loss. Patient  is very determined to lose weight and be healthy, and is interested in surgical weight loss for future weight loss. .    Otherwise patient denies any nausea, vomiting, fevers, chills, shortness of breath, chest pain, constipation or urinary symptoms.         Obesity related problems Celina Noel is dealing with:  Patient Active Problem List   Diagnosis    Asthma    Iron deficiency anemia    Lichen simplex    Menorrhagia with regular cycle    Generalized anxiety disorder    Seasonal allergic rhinitis due to pollen    Hidradenitis    Palpitations    Abscess of groin, right    Menorrhagia    Prediabetes    Obesity    Obstructive sleep apnea    PLMD (periodic limb movement disorder)    Pre-operative clearance    Morbid obesity with BMI of 45.0-49.9, adult (HCC)    Postprandial abdominal bloating           Pain Assessment   Denies any abdominal pain     Past Medical History:   Diagnosis Date  Allergic rhinitis     Anxiety     Asthma     Hidradenitis     History of prediabetes 2012    Iron deficiency anemia     Menorrhagia     Migraine     Obesity     Obstructive sleep apnea     Pre-operative clearance 10/27/2020    Prediabetes 2/17/2020     Past Surgical History:   Procedure Laterality Date    CHOLECYSTECTOMY      TONSILLECTOMY  1998     Family History   Problem Relation Age of Onset    Other Mother         uterine fibroids    Diabetes Mother     Thyroid Disease Mother     Elevated Lipids Mother     Diabetes Father     Hypertension Father     Stroke Father 64    Diabetes Other     Hypertension Other     Other Other         kidney disease (paternal uncle)    Other Maternal Grandmother         diverticulitis    Diabetes Maternal Grandmother     Other Paternal Grandmother         bone cancer     Heart Attack Paternal Grandmother     Colon Cancer Paternal Grandfather      Social History     Tobacco Use    Smoking status: Never Smoker    Smokeless tobacco: Never Used   Substance Use Topics    Alcohol use: Yes     Alcohol/week: 1.0 standard drinks     Types: 1 Glasses of wine per week     Frequency: 2-4 times a month     Drinks per session: 1 or 2     Binge frequency: Never     Comment: belongs to a wine-club     I counseled the patient on the importance of not smoking and risks of ETOH. Allergies   Allergen Reactions    Seasonal      Vitals:    10/27/20 1050   BP: 130/84   Pulse: 94   Resp: 18   SpO2: 98%   Weight: (!) 301 lb (136.5 kg)   Height: 5' 8\" (1.727 m)       Body mass index is 45.77 kg/m².       Current Outpatient Medications:     cetirizine (ZYRTEC) 10 MG tablet, Take 1 tablet by mouth daily, Disp: 30 tablet, Rfl: 5    montelukast (SINGULAIR) 10 MG tablet, Take 1 tablet by mouth daily, Disp: 30 tablet, Rfl: 3    metFORMIN (GLUCOPHAGE-XR) 500 MG extended release tablet, Take 1 tablet by mouth daily (with breakfast), Disp: 90 tablet, Rfl: 1    venlafaxine (EFFEXOR XR) 37.5 MG extended release capsule, Take 1 capsule by mouth daily, Disp: 60 capsule, Rfl: 2    ferrous sulfate (FE TABS) 325 (65 Fe) MG EC tablet, Take 1 tablet by mouth daily (with breakfast) (take with VitC for improved absorption), Disp: 60 tablet, Rfl: 3    Benzoyl Peroxide (BENZAC AC) 10 % external wash, Apply topically 2 times daily. , Disp: 187 g, Rfl: 3    albuterol sulfate HFA (VENTOLIN HFA) 108 (90 Base) MCG/ACT inhaler, Inhale 2 puffs into the lungs every 6 hours as needed for Wheezing, Disp: 1 Inhaler, Rfl: 3    fluticasone (FLONASE) 50 MCG/ACT nasal spray, SHAKE LIQUID AND USE 1 SPRAY IN EACH NOSTRIL DAILY, Disp: 1 Bottle, Rfl: 2      Review of Systems - History obtained from the patient  General ROS: overweight   Psychological ROS: negative  Ophthalmic ROS: negative  Neurological ROS: negative  ENT ROS: negative  Allergy and Immunology ROS: negative  Hematological and Lymphatic ROS: negative  Endocrine ROS: overweight  Breast ROS: negative  Respiratory ROS: negative  Cardiovascular ROS: negative  Gastrointestinal ROS:negative  Genito-Urinary ROS: negative  Musculoskeletal ROS: weight effects on joints  Skin ROS: negative    Physical Exam   Constitutional: Patient is oriented to person, place, and time. Vital signs are normal. Patient  appears well-developed and well-nourished. Patient  is active and cooperative. Non-toxic appearance. No distress. HENT:   Head: Normocephalic and atraumatic. Head is without laceration. Right Ear: External ear normal. No lacerations. No drainage, swelling or tenderness. Left Ear: External ear normal. No lacerations. No drainage, swelling or tenderness. Nose: Nose normal. No nose lacerations or nasal deformity. Mouth/Throat: Patient is wearing mask due to Covid-19 pandemic precautions, following CDC and health authorities guidelines. Eyes: Conjunctivae, EOM and lids are normal. Pupils are equal, round, and reactive to light.  Right eye exhibits no discharge. No foreign body present in the right eye. Left eye exhibits no discharge. No foreign body present in the left eye. No scleral icterus. Neck: Trachea normal and normal range of motion. Neck supple. No JVD present. No tracheal tenderness present. Carotid bruit is not present. No rigidity. No tracheal deviation and no edema present. No thyromegaly present. Cardiovascular: Normal rate, regular rhythm, normal heart sounds, intact distal pulses and normal pulses. Pulmonary/Chest: Effort normal and breath sounds normal. No stridor. No respiratory distress. Patient  has no wheezes. Patient has no rales. Patient exhibits no tenderness and no crepitus. Abdominal: Soft. Normal appearance and bowel sounds are normal. Patient exhibits no distension, no abdominal bruit, no ascites and no mass. There is no hepatosplenomegaly. There is no tenderness. There is no rigidity, no rebound, no guarding and no CVA tenderness. No hernia. Hernia confirmed negative in the ventral area. Musculoskeletal: Normal range of motion. Patient exhibits no edema or tenderness. Lymphadenopathy:        Head (right side): No submental, no submandibular, no preauricular, no posterior auricular and no occipital adenopathy present. Head (left side): No submental, no submandibular, no preauricular, no posterior auricular and no occipital adenopathy present. Patient  has no cervical adenopathy. Right: No supraclavicular adenopathy present. Left: No supraclavicular adenopathy present. Neurological: Patient is alert and oriented to person, place, and time. Patient has normal strength. Coordination and gait normal. GCS eye subscore is 4. GCS verbal subscore is 5. GCS motor subscore is 6. Skin: Skin is warm and dry. No abrasion and no rash noted. Patient  is not diaphoretic. No cyanosis or erythema. Psychiatric: Patient has a normal mood and affect.   speech is normal and behavior is normal. Cognition and memory are normal.         Alondra Chavarria was seen today for bariatric, initial visit. Diagnoses and all orders for this visit:    Morbid obesity with BMI of 45.0-49.9, adult ([facility] [address]  -     CBC Auto Differential; Future  -     Comprehensive Metabolic Panel; Future  -     Hemoglobin A1C; Future  -     Iron and TIBC; Future  -     Lipid Panel; Future  -     TSH with Reflex; Future  -     Vitamin A; Future  -     Vitamin B1, Whole Blood; Future  -     Vitamin B12 & Folate; Future  -     Vitamin D 25 Hydroxy; Future  -     Vitamin E; Future  -     Protime-INR; Future    Pre-operative clearance  -     CBC Auto Differential; Future  -     Comprehensive Metabolic Panel; Future  -     Hemoglobin A1C; Future  -     Iron and TIBC; Future  -     Lipid Panel; Future  -     TSH with Reflex; Future  -     Vitamin A; Future  -     Vitamin B1, Whole Blood; Future  -     Vitamin B12 & Folate; Future  -     Vitamin D 25 Hydroxy; Future  -     Vitamin E; Future  -     Protime-INR; Future    Prediabetes  -     CBC Auto Differential; Future  -     Comprehensive Metabolic Panel; Future  -     Hemoglobin A1C; Future  -     Iron and TIBC; Future  -     Lipid Panel; Future  -     TSH with Reflex; Future  -     Vitamin A; Future  -     Vitamin B1, Whole Blood; Future  -     Vitamin B12 & Folate; Future  -     Vitamin D 25 Hydroxy; Future  -     Vitamin E; Future  -     Protime-INR; Future    Iron deficiency anemia, unspecified iron deficiency anemia type  -     CBC Auto Differential; Future  -     Comprehensive Metabolic Panel; Future  -     Hemoglobin A1C; Future  -     Iron and TIBC; Future  -     Lipid Panel; Future  -     TSH with Reflex; Future  -     Vitamin A; Future  -     Vitamin B1, Whole Blood; Future  -     Vitamin B12 & Folate; Future  -     Vitamin D 25 Hydroxy; Future  -     Vitamin E; Future  -     Protime-INR; Future    Obstructive sleep apnea  -     CBC Auto Differential; Future  -     Comprehensive Metabolic Panel;  Future  - Hemoglobin A1C; Future  -     Iron and TIBC; Future  -     Lipid Panel; Future  -     TSH with Reflex; Future  -     Vitamin A; Future  -     Vitamin B1, Whole Blood; Future  -     Vitamin B12 & Folate; Future  -     Vitamin D 25 Hydroxy; Future  -     Vitamin E; Future  -     Protime-INR; Future    Postprandial abdominal bloating          A/P  Cathy Tipton is a very pleasant 31 y.o. female with Obesity,  Body mass index is 45.77 kg/m². and multiple obesity related co-morbidities. Cathy Tipton is very motivated to lose weight and being more healthy. We discussed how her weight affects her overall health including:  Patient Active Problem List   Diagnosis    Asthma    Iron deficiency anemia    Lichen simplex    Menorrhagia with regular cycle    Generalized anxiety disorder    Seasonal allergic rhinitis due to pollen    Hidradenitis    Palpitations    Abscess of groin, right    Menorrhagia    Prediabetes    Obesity    Obstructive sleep apnea    PLMD (periodic limb movement disorder)    Pre-operative clearance    Morbid obesity with BMI of 45.0-49.9, adult (HCC)    Postprandial abdominal bloating      The patient underwent extensive dietary counseling. I have reviewed, discussed and agree with the dietary plan. Medical weight loss and different surgical options were discussed in details with patient. Justice Arreaga is interested in surgical weight loss for future weight loss. Patient is interested in Laparoscopic Sleeve Gastrectomy, which I believe is an excellent option. We will proceed with pre-operative work up labs and studies. Will also petition patient's  insurance for approval for this procedure. I advised the patient that we can't guarantee final insurance approval.    Patient received dietary handouts and education. Patient advised that its their responsibility to follow up for studies, referrals and/or labs ordered today.    Also discussed in details the importance of follow up, as well following the recommendations and completing the whole program to improve outcomes when it comes to healthier lifestyle as well weight loss. Patient also advised about risks and benefits being on a strict dietary regimen as well using supplements. Patient agrees and wants to proceed with weight loss planning     Obesity as a disease is considered a high risk to patients overall health and should therefore be considered a high risk disease state. Now with Covid-19 pandemic, CDC and health authorities does classify obese patients as vulnerable and high risk as well. Which makes weight loss a priority for improvement of their wellbeing and overall health. CDC has issued the following statement as far Obese patients being at Increased Risk of being critically ill from SARS-Cov-2  \"Severe obesity increases the risk of a serious breathing problem called acute respiratory distress syndrome (ARDS), which is a major complication of FVPYM-44 and can cause difficulties with a doctors ability to provide respiratory support for seriously ill patients. People living with severe obesity can have multiple serious chronic diseases and underlying health conditions that can increase the risk of severe illness from COVID-19. \"       Patient Instructions   Patient received dietary handouts and education. Pre-operative work up Ordered:    - [employer]. - Psych Evaluation.   - CPAP Compliance. - EGD (Upper Endoscopy). - Support Group Attendance. - Obtain letter of medical necessity (PCP Letter). - Quit Smoking,  Alcohol, Caffeine and Carbonated Drinks  - Obtain records for Weight History 2 yrs. - F/U in 4 weeks. Patient advised that its their responsibility to follow up for studies, referrals and/or labs ordered today. Please note that some or all of this report was generated using voice recognition software.  Please notify me in case of any questions about the content of this

## 2020-10-27 NOTE — PATIENT INSTRUCTIONS
Patient received dietary handouts and education. Pre-operative work up Ordered:    - [employer]. - Psych Evaluation.   - CPAP Compliance. - EGD (Upper Endoscopy). - Support Group Attendance. - Obtain letter of medical necessity (PCP Letter). - Quit Smoking,  Alcohol, Caffeine and Carbonated Drinks  - Obtain records for Weight History 2 yrs. - F/U in 4 weeks. Patient advised that its their responsibility to follow up for studies, referrals and/or labs ordered today.

## 2020-10-27 NOTE — PROGRESS NOTES
Tommy Braden is a 31 y.o. female with a date of birth of 1995. Vitals:    10/27/20 1050   BP: 130/84   Pulse: 94   Resp: 18   SpO2: 98%    BMI: Body mass index is 45.77 kg/m². Obesity Classification: Class III    Weight History: Wt Readings from Last 3 Encounters:   10/27/20 (!) 301 lb (136.5 kg)   08/24/20 (!) 305 lb 12.5 oz (138.7 kg)   08/20/20 (!) 304 lb 1.6 oz (137.9 kg)       Patient's lowest adult weight was 220 lbs at age 11. Patient's highest adult weight was 305 lbs at age 31. Patient has participated in the following weight loss programs: Nutritional Counseling, Scar Restriction and LC. Patient has not participated in meal replacement/liquid diets. Patient has participated in weight loss medications - PCP started her on metformin. Patient is not lactose intolerant. Patient does not have Yarsanism/cultural food concerns. Patient does not have food allergies. Patient does tolerate artificial sweeteners. 24 hour recall/food frequency chart:  Breakfast: no.   Snack: yes. Leftover dinner (BBQ ribs & baked beans)  Lunch: no.   Snack: no.   Dinner: yes. [employer] (2 tacos extra sour cream) & 2 cinnamon delights  Snack: yes. Bowl of honey nut cheerios  Drinks throughout the day: water / cranberry juice   Do you drink alcohol? Yes. - 1 glass of wine 2x/month    Patient does meet the criteria for binge eating disorder. Patient does not have grazing. Patient does not have night eating. Patient does have a history of emotional eating or eating out of boredom. Surgery  Patient does feel confident in her ability to make these changes. The patient's expectations of post-surgical eating habits - voices understanding. Patient states she does understand the consequences of not complying with post-op food guidelines. Patient states she does understands the long term changes in food intake that will be necessary for all occasions after surgery for the rest of her life.       Patient is

## 2020-11-13 ENCOUNTER — HOSPITAL ENCOUNTER (EMERGENCY)
Age: 25
Discharge: HOME | End: 2020-11-13
Attending: EMERGENCY MEDICINE
Payer: COMMERCIAL

## 2020-11-13 ENCOUNTER — APPOINTMENT (OUTPATIENT)
Dept: CT IMAGING | Age: 25
End: 2020-11-13
Payer: COMMERCIAL

## 2020-11-13 VITALS
RESPIRATION RATE: 16 BRPM | BODY MASS INDEX: 43.1 KG/M2 | HEART RATE: 99 BPM | OXYGEN SATURATION: 99 % | DIASTOLIC BLOOD PRESSURE: 80 MMHG | HEIGHT: 69 IN | TEMPERATURE: 98.3 F | SYSTOLIC BLOOD PRESSURE: 130 MMHG | WEIGHT: 291 LBS

## 2020-11-13 PROBLEM — E66.01 MORBID OBESITY WITH BMI OF 40.0-44.9, ADULT (HCC): Status: ACTIVE | Noted: 2020-11-13

## 2020-11-13 LAB
A/G RATIO: 1.1 (ref 1.1–2.2)
ALBUMIN SERPL-MCNC: 4.1 G/DL (ref 3.4–5)
ALP BLD-CCNC: 78 U/L (ref 40–129)
ALT SERPL-CCNC: 26 U/L (ref 10–40)
ANION GAP SERPL CALCULATED.3IONS-SCNC: 10 MMOL/L (ref 3–16)
AST SERPL-CCNC: 19 U/L (ref 15–37)
BACTERIA WET PREP: NORMAL
BACTERIA: ABNORMAL /HPF
BASOPHILS ABSOLUTE: 0 K/UL (ref 0–0.2)
BASOPHILS RELATIVE PERCENT: 0.4 %
BILIRUB SERPL-MCNC: 1.1 MG/DL (ref 0–1)
BILIRUBIN URINE: ABNORMAL
BLOOD, URINE: ABNORMAL
BUN BLDV-MCNC: 9 MG/DL (ref 7–20)
CALCIUM SERPL-MCNC: 9.1 MG/DL (ref 8.3–10.6)
CHLORIDE BLD-SCNC: 103 MMOL/L (ref 99–110)
CLARITY: ABNORMAL
CLUE CELLS: NORMAL
CO2: 22 MMOL/L (ref 21–32)
COLOR: ABNORMAL
COMMENT UA: ABNORMAL
CREAT SERPL-MCNC: 0.6 MG/DL (ref 0.6–1.1)
EOSINOPHILS ABSOLUTE: 0.1 K/UL (ref 0–0.6)
EOSINOPHILS RELATIVE PERCENT: 1.3 %
EPITHELIAL CELLS WET PREP: NORMAL
EPITHELIAL CELLS, UA: >36 /HPF (ref 0–5)
GFR AFRICAN AMERICAN: >60
GFR NON-AFRICAN AMERICAN: >60
GLOBULIN: 3.7 G/DL
GLUCOSE BLD-MCNC: 135 MG/DL (ref 70–99)
GLUCOSE URINE: ABNORMAL MG/DL
HCG(URINE) PREGNANCY TEST: NEGATIVE
HCT VFR BLD CALC: 37.9 % (ref 36–48)
HEMOGLOBIN: 12.5 G/DL (ref 12–16)
HYALINE CASTS: ABNORMAL /LPF (ref 0–2)
KETONES, URINE: ABNORMAL MG/DL
LEUKOCYTE ESTERASE, URINE: ABNORMAL
LYMPHOCYTES ABSOLUTE: 1.5 K/UL (ref 1–5.1)
LYMPHOCYTES RELATIVE PERCENT: 25.2 %
MCH RBC QN AUTO: 25.4 PG (ref 26–34)
MCHC RBC AUTO-ENTMCNC: 33.1 G/DL (ref 31–36)
MCV RBC AUTO: 76.7 FL (ref 80–100)
MICROSCOPIC EXAMINATION: YES
MONOCYTES ABSOLUTE: 0.6 K/UL (ref 0–1.3)
MONOCYTES RELATIVE PERCENT: 10 %
NEUTROPHILS ABSOLUTE: 3.7 K/UL (ref 1.7–7.7)
NEUTROPHILS RELATIVE PERCENT: 63.1 %
NITRITE, URINE: ABNORMAL
PDW BLD-RTO: 14.1 % (ref 12.4–15.4)
PH UA: ABNORMAL (ref 5–8)
PLATELET # BLD: 272 K/UL (ref 135–450)
PMV BLD AUTO: 8.3 FL (ref 5–10.5)
POTASSIUM REFLEX MAGNESIUM: 3.8 MMOL/L (ref 3.5–5.1)
PROTEIN UA: ABNORMAL MG/DL
RBC # BLD: 4.93 M/UL (ref 4–5.2)
RBC UA: 611 /HPF (ref 0–4)
RBC WET PREP: NORMAL
SODIUM BLD-SCNC: 135 MMOL/L (ref 136–145)
SOURCE WET PREP: NORMAL
SPECIFIC GRAVITY UA: >1.03 (ref 1–1.03)
TOTAL PROTEIN: 7.8 G/DL (ref 6.4–8.2)
TRICHOMONAS PREP: NORMAL
URINE REFLEX TO CULTURE: YES
URINE TYPE: ABNORMAL
UROBILINOGEN, URINE: ABNORMAL E.U./DL
WBC # BLD: 5.8 K/UL (ref 4–11)
WBC UA: 14 /HPF (ref 0–5)
WBC WET PREP: NORMAL
YEAST WET PREP: NORMAL

## 2020-11-13 PROCEDURE — 87210 SMEAR WET MOUNT SALINE/INK: CPT

## 2020-11-13 PROCEDURE — 6370000000 HC RX 637 (ALT 250 FOR IP): Performed by: EMERGENCY MEDICINE

## 2020-11-13 PROCEDURE — 85025 COMPLETE CBC W/AUTO DIFF WBC: CPT

## 2020-11-13 PROCEDURE — 87491 CHLMYD TRACH DNA AMP PROBE: CPT

## 2020-11-13 PROCEDURE — 99283 EMERGENCY DEPT VISIT LOW MDM: CPT

## 2020-11-13 PROCEDURE — 74177 CT ABD & PELVIS W/CONTRAST: CPT

## 2020-11-13 PROCEDURE — 6360000002 HC RX W HCPCS: Performed by: EMERGENCY MEDICINE

## 2020-11-13 PROCEDURE — 87086 URINE CULTURE/COLONY COUNT: CPT

## 2020-11-13 PROCEDURE — 2500000003 HC RX 250 WO HCPCS: Performed by: EMERGENCY MEDICINE

## 2020-11-13 PROCEDURE — 84703 CHORIONIC GONADOTROPIN ASSAY: CPT

## 2020-11-13 PROCEDURE — 6360000004 HC RX CONTRAST MEDICATION: Performed by: EMERGENCY MEDICINE

## 2020-11-13 PROCEDURE — 80053 COMPREHEN METABOLIC PANEL: CPT

## 2020-11-13 PROCEDURE — 96372 THER/PROPH/DIAG INJ SC/IM: CPT

## 2020-11-13 PROCEDURE — 81001 URINALYSIS AUTO W/SCOPE: CPT

## 2020-11-13 PROCEDURE — 87591 N.GONORRHOEAE DNA AMP PROB: CPT

## 2020-11-13 RX ORDER — METRONIDAZOLE 500 MG/1
2000 TABLET ORAL ONCE
Status: COMPLETED | OUTPATIENT
Start: 2020-11-13 | End: 2020-11-13

## 2020-11-13 RX ORDER — LIDOCAINE HYDROCHLORIDE 10 MG/ML
1 INJECTION, SOLUTION EPIDURAL; INFILTRATION; INTRACAUDAL; PERINEURAL ONCE
Status: COMPLETED | OUTPATIENT
Start: 2020-11-13 | End: 2020-11-13

## 2020-11-13 RX ORDER — CEFTRIAXONE SODIUM 250 MG/1
250 INJECTION, POWDER, FOR SOLUTION INTRAMUSCULAR; INTRAVENOUS ONCE
Status: COMPLETED | OUTPATIENT
Start: 2020-11-13 | End: 2020-11-13

## 2020-11-13 RX ORDER — AZITHROMYCIN 500 MG/1
1000 TABLET, FILM COATED ORAL ONCE
Status: COMPLETED | OUTPATIENT
Start: 2020-11-13 | End: 2020-11-13

## 2020-11-13 RX ADMIN — CEFTRIAXONE SODIUM 250 MG: 250 INJECTION, POWDER, FOR SOLUTION INTRAMUSCULAR; INTRAVENOUS at 11:02

## 2020-11-13 RX ADMIN — LIDOCAINE HYDROCHLORIDE 1 ML: 10 INJECTION, SOLUTION EPIDURAL; INFILTRATION; INTRACAUDAL; PERINEURAL at 11:03

## 2020-11-13 RX ADMIN — METRONIDAZOLE 2000 MG: 500 TABLET ORAL at 11:03

## 2020-11-13 RX ADMIN — IOPAMIDOL 75 ML: 755 INJECTION, SOLUTION INTRAVENOUS at 09:29

## 2020-11-13 RX ADMIN — AZITHROMYCIN 1000 MG: 500 TABLET, FILM COATED ORAL at 11:02

## 2020-11-13 NOTE — ED NOTES
Pt discharged at this time. Discharge instructions and medications reviewed,  Questions were answered. PT verbalized understanding. VSS, Afebrile. Follow up appointments were discussed.          Jayro Wu RN  11/13/20 1450
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
## 2020-11-13 NOTE — ED TRIAGE NOTES
Patient arrived ambulatory. C/o n/v/d since Sunday. Also having bilateral groin pain. Currently on her period. Axox4. No respiratory distress.

## 2020-11-14 LAB — URINE CULTURE, ROUTINE: NORMAL

## 2020-11-16 ENCOUNTER — OFFICE VISIT (OUTPATIENT)
Dept: PRIMARY CARE CLINIC | Age: 25
End: 2020-11-16
Payer: COMMERCIAL

## 2020-11-16 PROCEDURE — 99211 OFF/OP EST MAY X REQ PHY/QHP: CPT | Performed by: NURSE PRACTITIONER

## 2020-11-16 NOTE — PROGRESS NOTES
Patient presented to [facility] drive up clinic for preop testing. Patient was swabbed and given information advising them to remain isolated until procedure date.

## 2020-11-18 LAB — SARS-COV-2: NOT DETECTED

## 2020-11-19 LAB
C TRACH DNA GENITAL QL NAA+PROBE: NEGATIVE
N. GONORRHOEAE DNA: NEGATIVE

## 2020-11-20 ENCOUNTER — NURSE TRIAGE (OUTPATIENT)
Dept: OTHER | Facility: CLINIC | Age: 25
End: 2020-11-20

## 2020-11-20 NOTE — TELEPHONE ENCOUNTER
Patient called pre-service center [facility]) Bhupinder with red flag complaint. Brief description of triage: UTI    Triage indicates for patient to be seen in office today    Care advice provided, patient verbalizes understanding; denies any other questions or concerns; instructed to call back for any new or worsening symptoms. Writer provided warm transfer to Brenda at [facility] for appointment scheduling. Attention Provider: Thank you for allowing me to participate in the care of your patient. The patient was connected to triage in response to information provided to the [facility]. Please do not respond through this encounter as the response is not directed to a shared pool. Reason for Disposition   Patient wants to be seen    Answer Assessment - Initial Assessment Questions  1. SYMPTOM: \"What's the main symptom you're concerned about? \" (e.g., frequency, incontinence)      Frequency and urgency with urination    2. ONSET: \"When did the  symptoms  start?\"      11/13    3. PAIN: \"Is there any pain? \" If so, ask: \"How bad is it? \" (Scale: 1-10; mild, moderate, severe)      No pain, just discomfort    4. CAUSE: \"What do you think is causing the symptoms? \"      Unsure    5. OTHER SYMPTOMS: \"Do you have any other symptoms? \" (e.g., fever, flank pain, blood in urine, pain with urination)      No    6. PREGNANCY: \"Is there any chance you are pregnant? \" \"When was your last menstrual period? \"      LMP 11/9    Protocols used: URINARY SYMPTOMS-ADULT-[place]

## 2020-11-22 ENCOUNTER — HOSPITAL ENCOUNTER (OUTPATIENT)
Dept: SLEEP CENTER | Age: 25
Discharge: HOME | End: 2020-11-22
Payer: COMMERCIAL

## 2020-11-22 PROCEDURE — 95811 POLYSOM 6/>YRS CPAP 4/> PARM: CPT

## 2020-11-22 PROCEDURE — 95811 POLYSOM 6/>YRS CPAP 4/> PARM: CPT | Performed by: PSYCHIATRY & NEUROLOGY

## 2020-11-25 ENCOUNTER — TELEPHONE (OUTPATIENT)
Dept: SLEEP MEDICINE | Age: 25
End: 2020-11-25

## 2020-11-26 PROBLEM — Z01.818 PRE-OPERATIVE CLEARANCE: Status: RESOLVED | Noted: 2020-10-27 | Resolved: 2020-11-26

## 2020-11-30 NOTE — PROGRESS NOTES
Bhargavi Munroe         : 1995    Diagnosis: [x] ALPHONSO (G47.33) [] CSA (G47.31) [] Apnea (G47.30)   Length of Need: [] 12 Months [x] 99 Months [] Other:    Machine (ALEXEI!): [x] Respironics Dream Station      Auto [x] ResMed AirSense     Auto [] Other:     [x]  CPAP () [] Bilevel ()   Mode: [x] Auto [] Spontaneous    Mode: [] Auto [] Spontaneous           8 cm                 Comfort Settings:   - Ramp Pressure: 5 cmH2O                                        - Ramp time: 15 min                                     -  Flex/EPR - 3 full time                                    - For ResMed Bilevel (TiMax-4 sec   TiMin- 0.2 sec)     Humidifier: [x] Heated ()        [x] Water chamber replacement ()/ 1 per 6 months        Mask:   [x] Nasal () /1 per 3 months [x] Full Face () /1 per 3 months   [x] Patient choice -Size and fit mask [x] Patient Choice - Size and fit mask   [] Dispense:  [] Dispense:    [x] Headgear () / 1 per 3 months [x] Headgear () / 1 per 3 months   [x] Replacement Nasal Cushion ()/2 per month [x] Interface Replacement ()/1 per month   [x] Replacement Nasal Pillows ()/2 per month         Tubing: [x] Heated ()/1 per 3 months    [] Standard ()/1 per 3 months [] Other:           Filters: [x] Non-disposable ()/1 per 6 months     [x] Ultra-Fine, Disposable ()/2 per month        Miscellaneous: [x] Chin Strap ()/ 1 per 6 months [] O2 bleed-in:       LPM   [] Oximetry on CPAP/Bilevel []  Other:          Start Order Date: 20    MEDICAL JUSTIFICATION:  I, the undersigned, certify that the above prescribed supplies are medically necessary for this patients wellbeing. In my opinion, the supplies are both reasonable and necessary in reference to accepted standards of medicalpractice in treatment of this patients condition.     Jessica Eng MD      NPI: [id number]       Order Signed Date: 20    Electronically signed by Dasha Olivares MD on 11/30/2020 at 8:09 AM

## 2020-12-09 NOTE — TELEPHONE ENCOUNTER
Leonela calls. She has yet to hear back from [facility] regarding her new cpap equipment. In looking at order, does not appear to have been faxed. Orders, sleep study, demographics, insur printed and faxed today to [facility].

## 2020-12-10 ENCOUNTER — TELEPHONE (OUTPATIENT)
Dept: BARIATRICS/WEIGHT MGMT | Age: 25
End: 2020-12-10

## 2020-12-16 ENCOUNTER — TELEPHONE (OUTPATIENT)
Dept: BARIATRICS/WEIGHT MGMT | Age: 25
End: 2020-12-16

## 2021-01-07 DIAGNOSIS — F41.1 GENERALIZED ANXIETY DISORDER: ICD-10-CM

## 2021-01-07 RX ORDER — VENLAFAXINE HYDROCHLORIDE 37.5 MG/1
37.5 CAPSULE, EXTENDED RELEASE ORAL DAILY
Qty: 60 CAPSULE | Refills: 2 | Status: SHIPPED | OUTPATIENT
Start: 2021-01-07 | End: 2021-06-28

## 2021-01-08 ENCOUNTER — TELEPHONE (OUTPATIENT)
Dept: PRIMARY CARE CLINIC | Age: 26
End: 2021-01-08

## 2021-01-08 NOTE — TELEPHONE ENCOUNTER
----- Message from Dandy Guzman sent at 1/7/2021 10:10 AM EST -----  Subject: Refill Request    QUESTIONS  Name of Medication? venlafaxine (EFFEXOR XR) 37.5 MG extended release   capsule  Patient-reported dosage and instructions? 37.5 mg take once every night   How many days do you have left? 0  Preferred Pharmacy? [facility][id number]  Pharmacy phone number (if available)? [phone]  Additional Information for Provider? Patient tried to call pharmacy for a   refill was told she needed to contact doctor   ---------------------------------------------------------------------------  --------------  [address]  What is the best way for the office to contact you? OK to leave message on   voicemail  Preferred Call Back Phone Number?  [phone]

## 2021-03-01 ENCOUNTER — OFFICE VISIT (OUTPATIENT)
Dept: SLEEP MEDICINE | Age: 26
End: 2021-03-01
Payer: COMMERCIAL

## 2021-03-01 VITALS
TEMPERATURE: 96.9 F | BODY MASS INDEX: 43.4 KG/M2 | RESPIRATION RATE: 12 BRPM | OXYGEN SATURATION: 97 % | WEIGHT: 293 LBS | HEART RATE: 102 BPM | DIASTOLIC BLOOD PRESSURE: 76 MMHG | HEIGHT: 69 IN | SYSTOLIC BLOOD PRESSURE: 126 MMHG

## 2021-03-01 DIAGNOSIS — Z99.89 OSA ON CPAP: Primary | ICD-10-CM

## 2021-03-01 DIAGNOSIS — G47.33 OSA ON CPAP: Primary | ICD-10-CM

## 2021-03-01 DIAGNOSIS — Z99.89 DEPENDENCE ON OTHER ENABLING MACHINES AND DEVICES: ICD-10-CM

## 2021-03-01 PROCEDURE — G8427 DOCREV CUR MEDS BY ELIG CLIN: HCPCS | Performed by: PSYCHIATRY & NEUROLOGY

## 2021-03-01 PROCEDURE — 99213 OFFICE O/P EST LOW 20 MIN: CPT | Performed by: PSYCHIATRY & NEUROLOGY

## 2021-03-01 PROCEDURE — 1036F TOBACCO NON-USER: CPT | Performed by: PSYCHIATRY & NEUROLOGY

## 2021-03-01 PROCEDURE — G8417 CALC BMI ABV UP PARAM F/U: HCPCS | Performed by: PSYCHIATRY & NEUROLOGY

## 2021-03-01 PROCEDURE — G8484 FLU IMMUNIZE NO ADMIN: HCPCS | Performed by: PSYCHIATRY & NEUROLOGY

## 2021-03-01 ASSESSMENT — SLEEP AND FATIGUE QUESTIONNAIRES
HOW LIKELY ARE YOU TO NOD OFF OR FALL ASLEEP WHILE LYING DOWN TO REST IN THE AFTERNOON WHEN CIRCUMSTANCES PERMIT: 1
ESS TOTAL SCORE: 3
HOW LIKELY ARE YOU TO NOD OFF OR FALL ASLEEP WHILE WATCHING TV: 1
HOW LIKELY ARE YOU TO NOD OFF OR FALL ASLEEP WHILE SITTING AND TALKING TO SOMEONE: 0
HOW LIKELY ARE YOU TO NOD OFF OR FALL ASLEEP WHILE SITTING INACTIVE IN A PUBLIC PLACE: 0
HOW LIKELY ARE YOU TO NOD OFF OR FALL ASLEEP WHILE SITTING AND READING: 0

## 2021-03-01 ASSESSMENT — ENCOUNTER SYMPTOMS: APNEA: 0

## 2021-03-01 NOTE — PROGRESS NOTES
Mimi Avitia         : 1995        PHONE: [phone] (home)   [facility]  Diagnosis: [x] ALPHONSO (G47.33) [] CSA (G47.31) [] Apnea (G47.30)   Length of Need: [] 12 Months [x] 99 Months [] Other:    Machine (ALEXEI!): [] Respironics Dream Station      Auto [] ResMed AirSense     Auto [] Other:     []  CPAP () [] Bilevel ()   Mode: [] Auto [] Spontaneous    Mode: [] Auto [] Spontaneous                                 Humidifier: [] Heated ()        [] Water chamber replacement ()/ 1 per 6 months        Mask:   [x] Nasal () /1 per 3 months [] Full Face () /1 per 3 months   [x] Patient choice -Size and fit mask [] Patient Choice - Size and fit mask   [x] Dispense: N30 I Airfit [] Dispense:    [x] Headgear () / 1 per 3 months [] Headgear () / 1 per 3 months   [x] Replacement Nasal Cushion ()/2 per month [] Interface Replacement ()/1 per month   [x] Replacement Nasal Pillows ()/2 per month         Tubing: [] Heated ()/1 per 3 months    [] Standard ()/1 per 3 months [] Other:           Filters: [] Non-disposable ()/1 per 6 months     [] Ultra-Fine, Disposable ()/2 per month        Miscellaneous: [x] Chin Strap ()/ 1 per 6 months [] O2 bleed-in:       LPM   [] Oximetry on CPAP/Bilevel []  Other:          Start Order Date: 21    MEDICAL JUSTIFICATION:  I, the undersigned, certify that the above prescribed supplies are medically necessary for this patients wellbeing. In my opinion, the supplies are both reasonable and necessary in reference to accepted standards of medicalpractice in treatment of this patients condition.     Hoyt Spatz, MD      NPI: [id number]       Order Signed Date: 21    Electronically signed by Hoyt Spatz, MD on 3/1/2021 at 1:16 PM

## 2021-03-01 NOTE — PATIENT INSTRUCTIONS
Patient Education        Learning About CPAP for Sleep Apnea  What is CPAP? CPAP is a small machine that you use at home every night while you sleep. It increases air pressure in your throat to keep your airway open. When you have sleep apnea, this can help you sleep better so you feel much better. CPAP stands for \"continuous positive airway pressure. \"  The CPAP machine will have one of the following:  · A mask that covers your nose and mouth  · Prongs that fit into your nose  · A mask that covers your nose only, which is the most common type. This type is called NCPAP. The N stands for \"nasal.\"  Why is it done? CPAP is usually the best treatment for obstructive sleep apnea. It is the first treatment choice and the most widely used. CPAP:  · Helps you have more normal sleep, so you feel less sleepy and more alert during the daytime. · May help keep heart failure or other heart problems from getting worse. · May help lower your blood pressure. If you use CPAP, your bed partner may also sleep better. That's because you aren't snoring or restless. Your doctor may suggest CPAP if you have:  · Moderate to severe sleep apnea. · Sleep apnea and coronary artery disease (CAD). · Sleep apnea and heart failure. What are the side effects? Some people who use CPAP have:  · A dry or stuffy nose and a sore throat. · Irritated skin on the face. · Sore eyes. · Bloating. How can you care for yourself? If using CPAP is not comfortable, or if you have certain side effects, work with your doctor to fix them. Here are some things you can try:  · Be sure the mask or nasal prongs fit well. · See if your doctor can adjust the pressure of your CPAP. · If your nose is dry, try a humidifier. · If your nose is runny or stuffy, try decongestant medicine or a steroid nasal spray. Be safe with medicines. Read and follow all instructions on the label. Do not use the medicine longer than the label says. If these things don't help, you might try a different type of machine. Some machines have air pressure that adjusts on its own. Others have air pressures that are different when you breathe in than when you breathe out. This may reduce discomfort caused by too much pressure in your nose. Where can you learn more? Go to [email or web address] org and sign in to your [employer] account. Enter Z980 in the Prepmatic box to learn more about \"Learning About CPAP for Sleep Apnea. \"     If you do not have an account, please click on the \"Sign Up Now\" link. Current as of: February 24, 2020               Content Version: 12.6  © 2006-2020 [employer]. Care instructions adapted under license by [facility] ([facility]). If you have questions about a medical condition or this instruction, always ask your healthcare professional. [address] any warranty or liability for your use of this information.

## 2021-03-01 NOTE — PROGRESS NOTES
MD KARLA Lino Board Certified in Sleep Medicine  Certified in [address] [place] [address] Certified in Neurology [address]  [address] [place] [address] [address] [place] [address],  Nito Sofyrosyalfonzo [address]  [phone]   [address] [place] [address], [address] [facility]                      [address]  [address] [facility] [address] [phone]    Subjective:     Patient ID: Raji No is a 31 y.o. female. Chief Complaint   Patient presents with    Sleep Apnea       HPI:        Raji No is a 31 y.o. female was seen today as a follow for obstructive sleep apnea. The patient underwent comprehensive polysomnogram on 10/01/2020, the overnight registration revealed mild obstructive sleep apnea with apnea hypopnea index of 5.8/hr with lowest O2 saturation of 90%, patient spent about 0 minutes below 90%. Subsequently, the patient underwent successful PAP titration on 11/22/2020, the lowest O2 saturation while on PAP was 95%. Patient is using the PAP machine about 7% of the time, more than 4 hours a nightabout  3 %, in total average of 3:39 hours a night in last 30 days. Currently on PAP at 8 cm (), the AHI is only 0.9 events per hour at this pressure. Patient improved regarding daytime sleepiness and fatigue, wakes up refreshed in the morning. The Patient scored Total score: 3 on Morven Sleepiness Scale ( more than 10 is indicative of daytime sleepiness)   allergic reaction to the FFM. Lost 6 pounds.    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     Occupation:    Social Needs    Financial resource strain: Somewhat hard    Food insecurity     Worry: Never true     Inability: Never true    Transportation needs     Medical: No     Non-medical: No   Tobacco Use    Smoking status: Never Smoker    Smokeless tobacco: Never Used   Substance and Sexual Activity    Alcohol use: Yes     Alcohol/week: 1.0 standard drinks     Types: 1 Glasses of wine per week     Frequency: 2-4 times a month     Drinks per session: 1 or 2     Binge frequency: Never     Comment: belongs to a wine-club    Drug use: No    Sexual activity: Yes     Partners: Male   Lifestyle    Physical activity     Days per week: 0 days     Minutes per session: 0 min    Stress: Not at all   Relationships    Social connections     Talks on phone: Not on file     Gets together: Not on file     Attends Holiness service: Not on file     Active member of club or organization: Not on file     Attends meetings of clubs or organizations: Not on file     Relationship status: Not on file    Intimate partner violence     Fear of current or ex partner: Not on file     Emotionally abused: Not on file     Physically abused: Not on file     Forced sexual activity: Not on file   Other Topics Concern    Not on file   Social History Narrative    Lives with mother and nephew       Prior to Admission medications    Medication Sig Start Date End Date Taking? Authorizing Provider   venlafaxine (EFFEXOR XR) 37.5 MG extended release capsule Take 1 capsule by mouth daily 1/7/21  Yes CASPER Ann CNP   Benzoyl Peroxide [facility]) 10 % external wash Apply topically 2 times daily.  8/3/20  Yes CASPER Ann CNP   cetirizine (ZYRTEC) 10 MG tablet Take 1 tablet by mouth daily 4/8/20  Yes Willem Corona MD   montelukast (SINGULAIR) 10 MG tablet Take 1 tablet by mouth daily 4/8/20  Yes Willem Corona MD  Other Other         kidney disease (paternal uncle)    Other Maternal Grandmother         diverticulitis    Diabetes Maternal Grandmother     Other Paternal Grandmother         bone cancer     Heart Attack Paternal Grandmother     Colon Cancer Paternal Grandfather        Review of Systems   Constitutional: Negative for fatigue. Respiratory: Negative for apnea. Genitourinary: Negative for frequency. Neurological: Negative for headaches. Objective:     Vitals:  Weight BMI Neck circumference    Wt Readings from Last 3 Encounters:   03/01/21 297 lb 12.8 oz (135.1 kg)   11/13/20 291 lb (132 kg)   10/27/20 (!) 301 lb (136.5 kg)    Body mass index is 43.98 kg/m². BP HR SaO2   BP Readings from Last 3 Encounters:   03/01/21 126/76   11/13/20 130/80   10/27/20 130/84    Pulse Readings from Last 3 Encounters:   03/01/21 102   11/13/20 99   10/27/20 94    SpO2 Readings from Last 3 Encounters:   03/01/21 97%   11/13/20 99%   10/27/20 98%        Themandibular molar Class :   []1 []2 []3      Mallampati I Airway Classification:   []1 []2 []3 []4      Physical Exam  Vitals signs and nursing note reviewed. Constitutional:       Appearance: Normal appearance. HENT:      Head: Atraumatic. Nose: Nose normal.      Mouth/Throat:      Mouth: Mucous membranes are moist.   Eyes:      Extraocular Movements: Extraocular movements intact. Neck:      Musculoskeletal: Normal range of motion and neck supple. Cardiovascular:      Rate and Rhythm: Normal rate and regular rhythm. Heart sounds: Normal heart sounds. Pulmonary:      Effort: Pulmonary effort is normal.      Breath sounds: Normal breath sounds. Musculoskeletal: Normal range of motion. Skin:     General: Skin is warm. Neurological:      General: No focal deficit present. Psychiatric:         Mood and Affect: Mood normal.         :   Very Mild Obstructive Sleep Apnea/Hypopnea Syndrome , she is allergic to the mask.     Diagnosis Orders 1. ALPHONSO on CPAP     2. Dependence on other enabling machines and devices       Plan:     Mask fitting with nasal mask (Remed brand) , allergic to Simplus mask   I will order PAP supplies, mask, filters. Most likely the patient will benefit from the gastric sleeve surgery in treating of the obstructive sleep apnea. In 2013, in a study published in Obesity Surgery Journal  A total of 69 studies with 13,900 patients were included and revealed that all the procedures achieved profound effects on ALPHONSO, as over 75 % of patients saw at least an improvement in their sleep apnea, bariatric surgery is a definitive treatment for obstructive sleep apnea, regardless of the specific type of surgery. We discussed the proportionality between weight and AHI. With 10% weight change, the AHI has a 27% proportionate change. With 20% weight change, the AHI has a 45-50% proportionate change. Patient is cleared for gastric sleeve surgery from ALPHONSO standpoint        No orders of the defined types were placed in this encounter. Return in about 4 months (around 7/1/2021) for Reveiwing CPAP usage and compliance report and tro.     Danica Marina MD  Medical Director [address] [place] [address]

## 2021-04-16 ENCOUNTER — NURSE TRIAGE (OUTPATIENT)
Dept: OTHER | Facility: CLINIC | Age: 26
End: 2021-04-16

## 2021-04-16 ENCOUNTER — VIRTUAL VISIT (OUTPATIENT)
Dept: PRIMARY CARE CLINIC | Age: 26
End: 2021-04-16
Payer: COMMERCIAL

## 2021-04-16 DIAGNOSIS — L73.2 SUPPURATIVE HIDRADENITIS: ICD-10-CM

## 2021-04-16 PROCEDURE — G8417 CALC BMI ABV UP PARAM F/U: HCPCS | Performed by: NURSE PRACTITIONER

## 2021-04-16 PROCEDURE — 99213 OFFICE O/P EST LOW 20 MIN: CPT | Performed by: NURSE PRACTITIONER

## 2021-04-16 PROCEDURE — 1036F TOBACCO NON-USER: CPT | Performed by: NURSE PRACTITIONER

## 2021-04-16 PROCEDURE — G8427 DOCREV CUR MEDS BY ELIG CLIN: HCPCS | Performed by: NURSE PRACTITIONER

## 2021-04-16 RX ORDER — CLINDAMYCIN PHOSPHATE 10 MG/G
GEL TOPICAL
Qty: 60 G | Refills: 3 | Status: SHIPPED | OUTPATIENT
Start: 2021-04-16 | End: 2021-04-23

## 2021-04-16 RX ORDER — DOXYCYCLINE HYCLATE 100 MG
100 TABLET ORAL 2 TIMES DAILY
Qty: 20 TABLET | Refills: 1 | Status: SHIPPED | OUTPATIENT
Start: 2021-04-16 | End: 2021-04-26

## 2021-04-16 ASSESSMENT — PATIENT HEALTH QUESTIONNAIRE - PHQ9: SUM OF ALL RESPONSES TO PHQ QUESTIONS 1-9: 0

## 2021-04-16 NOTE — PROGRESS NOTES
[address] [facility]  [address]  [address] [facility] [place] [address]  Dept: [phone]       2021   Restrictions on close contact interactions as recommended by CDC and health authorities, the patient's visit was conducted via telemedicine in lieu of a face to face visit. Patient verbally consented and agreed to proceed    Misty Finn (:  [id number])ZA a 31 y.o. female, here for evaluation of the following medical concerns:    HPI   Hidradenitis  She reports a long history of infections, I&D's, drainage from both axilla, occurring less often in  groin area. Has a history of  I&D and courses of  antibiotics. Reoccurrences have diminished since use of Hibiclens twice weekly but local pharmacy is often out of product. Today she reports an abscess of left axilla. Area is swollen and tender, applying warm compresses. States it \"has not come to a head,\" and is requesting antibiotics. Does not feel she needs to have it drained at this time.       hidran  Lab Results   Component Value Date    CHOL 148 2018    CHOL 155 2017     Lab Results   Component Value Date    TRIG 116 2018    TRIG 64 2017     Lab Results   Component Value Date    HDL 42 2018    HDL 48 2017     Lab Results   Component Value Date    LDLCHOLESTEROL 83 2018    LDLCALC 94 2017     Lab Results   Component Value Date    LABVLDL 13 2017     Lab Results   Component Value Date    CHOLHDLRATIO 4 2018     Lab Results   Component Value Date    WBC 5.8 2020    HGB 12.5 2020    HCT 37.9 2020    MCV 76.7 (L) 2020     2020     Lab Results   Component Value Date     (L) 2020    K 3.8 2020     2020    CO2 22 2020    BUN 9 2020    CREATININE 0.6 2020    GLUCOSE 135 (H) 2020    CALCIUM 9.1 2020    PROT 7.8 2020    LABALBU 4.1 2020    BILITOT 1.1 (H) 2020 ALKPHOS 78 11/13/2020    AST 19 11/13/2020    ALT 26 11/13/2020    LABGLOM >60 11/13/2020    GFRAA >60 11/13/2020    AGRATIO 1.1 11/13/2020    GLOB 3.7 11/13/2020       Review of Systems   Respiratory: Negative for chest tightness and shortness of breath. Cardiovascular: Negative for chest pain and leg swelling. Skin:        Boil under left axilla        Prior to Visit Medications    Medication Sig Taking? Authorizing Provider   clindamycin (CLEOCIN-T) 1 % gel Apply topically 2 times daily. Yes CASPER Nguyen CNP   doxycycline hyclate (VIBRA-TABS) 100 MG tablet Take 1 tablet by mouth 2 times daily for 10 days Yes CASPER Nguyen CNP   venlafaxine (EFFEXOR XR) 37.5 MG extended release capsule Take 1 capsule by mouth daily Yes CASPER Nguyen CNP   Benzoyl Peroxide [facility]) 10 % external wash Apply topically 2 times daily. Yes CASPER Nguyen CNP   cetirizine (ZYRTEC) 10 MG tablet Take 1 tablet by mouth daily Yes Blas Clancy MD   montelukast (SINGULAIR) 10 MG tablet Take 1 tablet by mouth daily Yes Blas Clancy MD   metFORMIN (GLUCOPHAGE-XR) 500 MG extended release tablet Take 1 tablet by mouth daily (with breakfast) Yes Blas Clancy MD   albuterol sulfate HFA (VENTOLIN HFA) 108 (90 Base) MCG/ACT inhaler Inhale 2 puffs into the lungs every 6 hours as needed for Wheezing Yes Blas Clancy MD   ferrous sulfate (FE TABS) 325 (65 Fe) MG EC tablet Take 1 tablet by mouth daily (with breakfast) (take with VitC for improved absorption) Yes Elaina Olszewski, MD   fluticasone (FLONASE) 50 MCG/ACT nasal spray SHAKE LIQUID AND USE 1 SPRAY IN EACH NOSTRIL DAILY Yes Elaina Olszewski, MD        Social History     Tobacco Use    Smoking status: Never Smoker    Smokeless tobacco: Never Used   Substance Use Topics    Alcohol use:  Yes     Alcohol/week: 1.0 standard drinks     Types: 1 Glasses of wine per week     Frequency: 2-4 times a month     Drinks per session: 1 or 2     Binge frequency: Never     Comment: belongs to a wine-club        There were no vitals filed for this visit. Estimated body mass index is 43.98 kg/m² as calculated from the following:    Height as of 3/1/21: 5' 9\" (1.753 m). Weight as of 3/1/21: 297 lb 12.8 oz (135.1 kg). Physical exam limited due to virtual visit. Physical Exam  Constitutional:       Appearance: Normal appearance. She is well-developed and well-groomed. HENT:      Head: Normocephalic. Right Ear: Hearing normal.      Left Ear: Hearing normal.   Eyes:      General: Lids are normal.   Pulmonary:      Effort: Pulmonary effort is normal.   Neurological:      General: No focal deficit present. Mental Status: She is alert and oriented to person, place, and time. Psychiatric:         Behavior: Behavior is cooperative. ASSESSMENT/PLAN:  1. Suppurative hidradenitis    - clindamycin (CLEOCIN-T) 1 % gel; Apply topically 2 times daily. Dispense: 60 g; Refill: 3  - doxycycline hyclate (VIBRA-TABS) 100 MG tablet; Take 1 tablet by mouth 2 times daily for 10 days  Dispense: 20 tablet; Refill: 1      No follow-ups on file.         --CASPER Au - CNP on 4/18/2021 at 9:30 AM

## 2021-04-16 NOTE — TELEPHONE ENCOUNTER
Reason for Disposition   Boil > 1/4 inch across (> 6 mm; larger than a pencil eraser) and on face    Answer Assessment - Initial Assessment Questions  1. APPEARANCE of BOIL: \"What does the boil look like? \"       Red, swollen and tender    2. LOCATION: \"Where is the boil located? \"       Under her left arm (armpit)    3. NUMBER: \"How many boils are there? \"       One    4. SIZE: \"How big is the boil? \" (e.g., inches, cm; compare to size of a coin or other object)      Size of a nickel    5. ONSET: \"When did the boil start? \"      Onset was yesterday    6. PAIN: \"Is there any pain? \" If so, ask: \"How bad is the pain? \"   (Scale 1-10; or mild, moderate, severe)      Painful and tender    7. FEVER: \"Do you have a fever? \" If so, ask: \"What is it, how was it measured, and when did it start? \"       No fever    8. SOURCE: \"Have you been around anyone with boils or other Staph infections? \" \"Have you ever had boils before? \"      Yes - she has had these before - doxycycline or clindamycin to resolve    9. OTHER SYMPTOMS: \"Do you have any other symptoms? \" (e.g., shaking chills, weakness, rash elsewhere on body)      No other symptoms    10. PREGNANCY: \"Is there any chance you are pregnant? \" \"When was your last menstrual period? \"        No    Protocols used: BOIL (SKIN ABSCESS)-ADULT-OH    Call came in from [place] [facility]; patient reaching out to her PCP at the [facility] location    See triage above: Recommended patient be seen today or tomorrow. Provided care advice. [place] [facility] for scheduling.

## 2021-04-18 ASSESSMENT — ENCOUNTER SYMPTOMS
SHORTNESS OF BREATH: 0
CHEST TIGHTNESS: 0

## 2021-04-20 ENCOUNTER — OFFICE VISIT (OUTPATIENT)
Dept: PRIMARY CARE CLINIC | Age: 26
End: 2021-04-20
Payer: COMMERCIAL

## 2021-04-20 VITALS
SYSTOLIC BLOOD PRESSURE: 118 MMHG | HEART RATE: 98 BPM | TEMPERATURE: 97.9 F | OXYGEN SATURATION: 98 % | WEIGHT: 293 LBS | DIASTOLIC BLOOD PRESSURE: 74 MMHG | BODY MASS INDEX: 44.24 KG/M2

## 2021-04-20 DIAGNOSIS — Z13.1 SCREENING FOR DIABETES MELLITUS: ICD-10-CM

## 2021-04-20 DIAGNOSIS — Z13.0 SCREENING FOR DEFICIENCY ANEMIA: ICD-10-CM

## 2021-04-20 DIAGNOSIS — E66.01 MORBID OBESITY WITH BMI OF 45.0-49.9, ADULT (HCC): ICD-10-CM

## 2021-04-20 DIAGNOSIS — Z11.59 NEED FOR HEPATITIS C SCREENING TEST: ICD-10-CM

## 2021-04-20 DIAGNOSIS — L73.2 SUPPURATIVE HIDRADENITIS: ICD-10-CM

## 2021-04-20 DIAGNOSIS — L73.2 HIDRADENITIS: ICD-10-CM

## 2021-04-20 DIAGNOSIS — N61.1 ABSCESS OF BREAST: Primary | ICD-10-CM

## 2021-04-20 DIAGNOSIS — Z13.29 SCREENING FOR THYROID DISORDER: ICD-10-CM

## 2021-04-20 DIAGNOSIS — J45.20 MILD INTERMITTENT ASTHMA WITHOUT COMPLICATION: ICD-10-CM

## 2021-04-20 PROCEDURE — G8427 DOCREV CUR MEDS BY ELIG CLIN: HCPCS | Performed by: NURSE PRACTITIONER

## 2021-04-20 PROCEDURE — G8417 CALC BMI ABV UP PARAM F/U: HCPCS | Performed by: NURSE PRACTITIONER

## 2021-04-20 PROCEDURE — 99214 OFFICE O/P EST MOD 30 MIN: CPT | Performed by: NURSE PRACTITIONER

## 2021-04-20 PROCEDURE — 1036F TOBACCO NON-USER: CPT | Performed by: NURSE PRACTITIONER

## 2021-04-20 RX ORDER — ALBUTEROL SULFATE 90 UG/1
2 AEROSOL, METERED RESPIRATORY (INHALATION) EVERY 6 HOURS PRN
Qty: 1 INHALER | Refills: 3 | Status: SHIPPED | OUTPATIENT
Start: 2021-04-20 | End: 2022-05-17

## 2021-04-20 ASSESSMENT — ENCOUNTER SYMPTOMS
CHEST TIGHTNESS: 0
SHORTNESS OF BREATH: 0
ABDOMINAL PAIN: 0
DIARRHEA: 0
CONSTIPATION: 0

## 2021-04-20 NOTE — PROGRESS NOTES
11/13/2020    CREATININE 0.6 11/13/2020    GLUCOSE 135 (H) 11/13/2020    CALCIUM 9.1 11/13/2020    PROT 7.8 11/13/2020    LABALBU 4.1 11/13/2020    BILITOT 1.1 (H) 11/13/2020    ALKPHOS 78 11/13/2020    AST 19 11/13/2020    ALT 26 11/13/2020    LABGLOM >60 11/13/2020    GFRAA >60 11/13/2020    AGRATIO 1.1 11/13/2020    GLOB 3.7 11/13/2020       Review of Systems   Constitutional: Negative for activity change and fever. HENT: Negative for congestion. Eyes: Negative for visual disturbance. Respiratory: Negative for chest tightness and shortness of breath. Abscess on right breast   Cardiovascular: Negative for chest pain, palpitations and leg swelling. Gastrointestinal: Negative for abdominal pain, constipation and diarrhea. Endocrine: Negative for polyuria. Genitourinary: Negative for dysuria. Musculoskeletal: Negative for arthralgias and myalgias. Skin: Negative for rash. Neurological: Negative for dizziness, light-headedness and headaches. Psychiatric/Behavioral: Negative for agitation, decreased concentration and sleep disturbance. The patient is not nervous/anxious. Prior to Visit Medications    Medication Sig Taking? Authorizing Provider   albuterol sulfate HFA (VENTOLIN HFA) 108 (90 Base) MCG/ACT inhaler Inhale 2 puffs into the lungs every 6 hours as needed for Wheezing Yes CASPER Burdick CNP   Benzoyl Peroxide [facility]) 10 % external wash Apply topically 2 times daily. Yes CASPER Burdick CNP   clindamycin (CLEOCIN-T) 1 % gel Apply topically 2 times daily.  Yes CASPER Burdick CNP   doxycycline hyclate (VIBRA-TABS) 100 MG tablet Take 1 tablet by mouth 2 times daily for 10 days Yes CASPER Burdick CNP   venlafaxine (EFFEXOR XR) 37.5 MG extended release capsule Take 1 capsule by mouth daily Yes CASPER Burdick CNP   cetirizine (ZYRTEC) 10 MG tablet Take 1 tablet by mouth daily Yes Yesenia Murillo MD   montelukast (SINGULAIR) 10 MG tablet Take 1 tablet by mouth daily Yes Gabriela Kennedy MD   metFORMIN (GLUCOPHAGE-XR) 500 MG extended release tablet Take 1 tablet by mouth daily (with breakfast) Yes Gabriela Kennedy MD   ferrous sulfate (FE TABS) 325 (65 Fe) MG EC tablet Take 1 tablet by mouth daily (with breakfast) (take with VitC for improved absorption) Yes Tere Kimball MD   fluticasone (FLONASE) 50 MCG/ACT nasal spray SHAKE LIQUID AND USE 1 SPRAY IN EACH NOSTRIL DAILY Yes Tere Kimball MD        Social History     Tobacco Use    Smoking status: Never Smoker    Smokeless tobacco: Never Used   Substance Use Topics    Alcohol use: Yes     Alcohol/week: 1.0 standard drinks     Types: 1 Glasses of wine per week     Frequency: 2-4 times a month     Drinks per session: 1 or 2     Binge frequency: Never     Comment: belongs to a wine-club        Vitals:    04/20/21 1357   BP: 118/74   Pulse: 98   Temp: 97.9 °F (36.6 °C)   TempSrc: Temporal   SpO2: 98%   Weight: 299 lb 9.6 oz (135.9 kg)     Estimated body mass index is 44.24 kg/m² as calculated from the following:    Height as of 3/1/21: 5' 9\" (1.753 m). Weight as of this encounter: 299 lb 9.6 oz (135.9 kg). Physical Exam  Vitals signs reviewed. Constitutional:       Appearance: She is well-developed. HENT:      Head: Normocephalic. Right Ear: Hearing and external ear normal.      Left Ear: Hearing and external ear normal.      Nose: Nose normal.   Eyes:      General: Lids are normal.   Cardiovascular:      Rate and Rhythm: Normal rate and regular rhythm. Heart sounds: Normal heart sounds, S1 normal and S2 normal.   Pulmonary:      Effort: Pulmonary effort is normal.      Breath sounds: Normal breath sounds. Chest:      Breasts:         Right: No swelling, mass, nipple discharge or tenderness. Comments: Actual photo in media section  Musculoskeletal: Normal range of motion. Skin:     General: Skin is warm and dry. Findings: No rash.    Neurological: Mental Status: She is alert and oriented to person, place, and time. GCS: GCS eye subscore is 4. GCS verbal subscore is 5. GCS motor subscore is 6. Psychiatric:         Speech: Speech normal.         Behavior: Behavior normal. Behavior is cooperative. ASSESSMENT/PLAN:  1. Mild intermittent asthma without complication    - albuterol sulfate HFA (VENTOLIN HFA) 108 (90 Base) MCG/ACT inhaler; Inhale 2 puffs into the lungs every 6 hours as needed for Wheezing  Dispense: 1 Inhaler; Refill: 3    2. Suppurative hidradenitis    - Benzoyl Peroxide (BENZAC AC) 10 % external wash; Apply topically 2 times daily. Dispense: 187 g; Refill: 3    3. Abscess of breast  -bacterial skin infection vs cyst  Keep clean and dry  - Hilaria Limon MD, Breast Surgery, [facility]    4. Screening for deficiency anemia    - CBC Auto Differential; Future    5. Screening for diabetes mellitus    - Comprehensive Metabolic Panel; Future    6. Screening for thyroid disorder    - TSH with Reflex; Future  - T4, Free; Future    7. Need for hepatitis C screening test    - Hepatitis C Antibody; Future    8. Morbid obesity with BMI of 45.0-49.9, adult ([facility] [address]  -The patient is advised to begin progressive daily aerobic exercise program, follow a low fat, low cholesterol diet, attempt to lose weight, reduce exposure to stress and improve dietary compliance. 9. Hidradenitis  -Keep areas clean and dry  -Use Hibiclens twice weekly  -Complete antibiotics      Return in about 3 months (around 7/20/2021).         --CASPER Magaña - CNP on 4/20/2021 at 8:48 PM

## 2021-04-22 ENCOUNTER — TELEPHONE (OUTPATIENT)
Dept: SURGERY | Age: 26
End: 2021-04-22

## 2021-04-22 NOTE — TELEPHONE ENCOUNTER
Spoke to patient and reviewed new patient intake form. Also confirmed her appointment for Friday 4/23/21 at 9:30 am. Gave directions to the building. Patient verbalized understanding.

## 2021-04-23 ENCOUNTER — OFFICE VISIT (OUTPATIENT)
Dept: SURGERY | Age: 26
End: 2021-04-23
Payer: COMMERCIAL

## 2021-04-23 VITALS
WEIGHT: 293 LBS | OXYGEN SATURATION: 100 % | RESPIRATION RATE: 18 BRPM | HEART RATE: 103 BPM | SYSTOLIC BLOOD PRESSURE: 132 MMHG | HEIGHT: 69 IN | BODY MASS INDEX: 43.4 KG/M2 | DIASTOLIC BLOOD PRESSURE: 76 MMHG

## 2021-04-23 DIAGNOSIS — N64.4 BREAST PAIN: ICD-10-CM

## 2021-04-23 DIAGNOSIS — N61.1 ABSCESS OF RIGHT BREAST: Primary | ICD-10-CM

## 2021-04-23 PROCEDURE — 99203 OFFICE O/P NEW LOW 30 MIN: CPT | Performed by: NURSE PRACTITIONER

## 2021-04-23 PROCEDURE — G8427 DOCREV CUR MEDS BY ELIG CLIN: HCPCS | Performed by: NURSE PRACTITIONER

## 2021-04-23 PROCEDURE — 1036F TOBACCO NON-USER: CPT | Performed by: NURSE PRACTITIONER

## 2021-04-23 PROCEDURE — G8417 CALC BMI ABV UP PARAM F/U: HCPCS | Performed by: NURSE PRACTITIONER

## 2021-04-23 NOTE — PATIENT INSTRUCTIONS
Healthy Lifestyle Recommendations: healthy diet (decrease consumption of red meat, increase fresh fruits and vegetables), decreased alcohol consumption (less than 4 drinks/week), adequate sleep (goal 6-8 hours), routine exercise (goal 150 minutes/week or greater), weight control. Patient Education        Breast Self-Exam: Care Instructions  Your Care Instructions     A breast self-exam is when you check your breasts for lumps or changes. This regular exam helps you learn how your breasts normally look and feel. Most breast problems or changes are not because of cancer. Breast self-exam is not a substitute for a mammogram. Having regular breast exams by your doctor and regular mammograms improve your chances of finding any problems with your breasts. Some women set a time each month to do a step-by-step breast self-exam. Other women like a less formal system. They might look at their breasts as they brush their teeth, or feel their breasts once in a while in the shower. If you notice a change in your breast, tell your doctor. Follow-up care is a key part of your treatment and safety. Be sure to make and go to all appointments, and call your doctor if you are having problems. It's also a good idea to know your test results and keep a list of the medicines you take. How do you do a breast self-exam?  · The best time to examine your breasts is usually one week after your menstrual period begins. Your breasts should not be tender then. If you do not have periods, you might do your exam on a day of the month that is easy to remember. · To examine your breasts:  ? Remove all your clothes above the waist and lie down. When you are lying down, your breast tissue spreads evenly over your chest wall, which makes it easier to feel all your breast tissue. ?  Use the padsnot the fingertipsof the 3 middle fingers of your left hand to check your right breast. Move your fingers slowly in small coin-sized circles that overlap. ? Use three levels of pressure to feel of all your breast tissue. Use light pressure to feel the tissue close to the skin surface. Use medium pressure to feel a little deeper. Use firm pressure to feel your tissue close to your breastbone and ribs. Use each pressure level to feel your breast tissue before moving on to the next spot. ? Check your entire breast, moving up and down as if following a strip from the collarbone to the bra line, and from the armpit to the ribs. Repeat until you have covered the entire breast.  ? Repeat this procedure for your left breast, using the pads of the 3 middle fingers of your right hand. · To examine your breasts while in the shower:  ? Place one arm over your head and lightly soap your breast on that side. ? Using the pads of your fingers, gently move your hand over your breast (in the strip pattern described above), feeling carefully for any lumps or changes. ? Repeat for the other breast.  · Have your doctor inspect anything you notice to see if you need further testing. Where can you learn more? Go to [email or web address]. org and sign in to your [employer] account. Enter P148 in the [employer] box to learn more about \"Breast Self-Exam: Care Instructions. \"     If you do not have an account, please click on the \"Sign Up Now\" link. Current as of: December 17, 2020               Content Version: 12.8  © 7700-3882 Healthwise, Incorporated. Care instructions adapted under license by [facility]. If you have questions about a medical condition or this instruction, always ask your healthcare professional. [place] [address] any warranty or liability for your use of this information.

## 2021-04-23 NOTE — PROGRESS NOTES
[facility]   Surgical Breast Oncology     Primary Care Provider:     CC: Breast Abscess, Right     Dimitry Khalil is being seen at the request of Dhaval Reid CNP for a consultation for breast abscess. HPI: Ms. Dimitry Khalil is a 31 y.o. woman who presents today with new right breast abscess. She reports redness, swelling, and flaky skin for 1 week. No injury or trauma. Denies fevers/chills. Completed 7 days of Doxycycline with improvement in all symptoms. Still has small amount of redness and small amount of flakiness. She denies any other abnormalities such as masses, color changes, nipple discharge, or changes to the nipple-areolar complex. She has not had breast infections in the past, although does have a history of hidradenitis suppurative and has had nodules in her axilla and groin folds. She has never had breast imaging or required a breast biopsy. She has no family history of breast or ovarian cancer. Review of Systems    Past Medical History:   Diagnosis Date    Allergic rhinitis     Anxiety     Asthma     Hidradenitis     History of prediabetes 2012    Iron deficiency anemia     Menorrhagia     Migraine     Obesity     Obstructive sleep apnea     Pre-operative clearance 10/27/2020    Prediabetes 2/17/2020       Past Surgical History:   Procedure Laterality Date    CHOLECYSTECTOMY      TONSILLECTOMY  1998       Allergies as of 04/23/2021 - Review Complete 04/23/2021   Allergen Reaction Noted    Seasonal  10/24/2018       Social History     Tobacco Use    Smoking status: Never Smoker    Smokeless tobacco: Never Used   Substance Use Topics    Alcohol use: Yes     Alcohol/week: 1.0 standard drinks     Types: 1 Glasses of wine per week     Frequency: 2-4 times a month     Drinks per session: 1 or 2     Binge frequency: Never     Comment: belongs to a wine-club    Drug use: No         Family History:  No family history of breast or ovarian cancer.     Hormonal Pulmonary: No accessory muscle use. Respirations non-labored and no wheezing. Lymphatics: No palpable supraclavicular, cervical, or axillary lymphadenopathy  Skin: No rash noted. No erythema. Neurologic: alert and oriented. Extremities: appear well perfused. No edema. No joint deformity         Risk assessment using INÉ[employer] Breast Cancer Risk Evaluation Tool to evaluate her risk compared to the general population. Her lifetime risk for breast cancer is 15.9% (general population average 13.4%). ASSESSMENT:   · Right Breast Abscess -  Completed 7 days of Doxycycline. Improving. · Hidradenitis suppurative  - history of recurrent painful inflammatory nodules and hypertrophic scarring noted in axillary and groin folds        PLAN:   · Continue to monitor. If redness and flakiness does not resolve will consider punch biopsy of the area to r/o malignancy however, skin changes appear to be consistent with resolving abscess. · Call the office for any of the concerning symptoms: worsening pain/tenderness, increased firmness, warmth, swelling, skin changes, fever and/or chills. · Will follow up with imaging once abscess has resolved. · Discussed the importance of breast awareness including the importance and technique of self breast exams  · Continue current hidradenitis treatment regimen   · Follow up 2 weeks        All of the patient's questions were answered at this time however, she was encouraged to call the office with any further inquiries. Approximately 30 minutes of time were spent in preparation, direct patient contact, counseling, care coordination, documentation and activities otherwise related to this encounter.           Li Hanley, CASPER-CNP  [facility])   Surgical Breast Oncology   [phone]

## 2021-04-29 DIAGNOSIS — Z13.29 SCREENING FOR THYROID DISORDER: ICD-10-CM

## 2021-04-29 DIAGNOSIS — Z11.59 NEED FOR HEPATITIS C SCREENING TEST: ICD-10-CM

## 2021-04-29 DIAGNOSIS — Z13.1 SCREENING FOR DIABETES MELLITUS: ICD-10-CM

## 2021-04-29 DIAGNOSIS — Z13.0 SCREENING FOR DEFICIENCY ANEMIA: ICD-10-CM

## 2021-04-29 LAB
A/G RATIO: 1.4 (ref 1.1–2.2)
ALBUMIN SERPL-MCNC: 4.2 G/DL (ref 3.4–5)
ALP BLD-CCNC: 74 U/L (ref 40–129)
ALT SERPL-CCNC: 28 U/L (ref 10–40)
ANION GAP SERPL CALCULATED.3IONS-SCNC: 14 MMOL/L (ref 3–16)
AST SERPL-CCNC: 21 U/L (ref 15–37)
BASOPHILS ABSOLUTE: 0 K/UL (ref 0–0.2)
BASOPHILS RELATIVE PERCENT: 0.2 %
BILIRUB SERPL-MCNC: 0.6 MG/DL (ref 0–1)
BUN BLDV-MCNC: 7 MG/DL (ref 7–20)
CALCIUM SERPL-MCNC: 9.2 MG/DL (ref 8.3–10.6)
CHLORIDE BLD-SCNC: 104 MMOL/L (ref 99–110)
CO2: 22 MMOL/L (ref 21–32)
CREAT SERPL-MCNC: 0.6 MG/DL (ref 0.6–1.1)
EOSINOPHILS ABSOLUTE: 0.1 K/UL (ref 0–0.6)
EOSINOPHILS RELATIVE PERCENT: 1.4 %
GFR AFRICAN AMERICAN: >60
GFR NON-AFRICAN AMERICAN: >60
GLOBULIN: 3.1 G/DL
GLUCOSE BLD-MCNC: 91 MG/DL (ref 70–99)
HCT VFR BLD CALC: 32.4 % (ref 36–48)
HEMOGLOBIN: 10.6 G/DL (ref 12–16)
HEPATITIS C ANTIBODY INTERPRETATION: NORMAL
LYMPHOCYTES ABSOLUTE: 2.8 K/UL (ref 1–5.1)
LYMPHOCYTES RELATIVE PERCENT: 39.7 %
MCH RBC QN AUTO: 24.1 PG (ref 26–34)
MCHC RBC AUTO-ENTMCNC: 32.6 G/DL (ref 31–36)
MCV RBC AUTO: 74.1 FL (ref 80–100)
MONOCYTES ABSOLUTE: 0.4 K/UL (ref 0–1.3)
MONOCYTES RELATIVE PERCENT: 6.2 %
NEUTROPHILS ABSOLUTE: 3.7 K/UL (ref 1.7–7.7)
NEUTROPHILS RELATIVE PERCENT: 52.5 %
PDW BLD-RTO: 14.9 % (ref 12.4–15.4)
PLATELET # BLD: 324 K/UL (ref 135–450)
PMV BLD AUTO: 8.7 FL (ref 5–10.5)
POTASSIUM SERPL-SCNC: 4.2 MMOL/L (ref 3.5–5.1)
RBC # BLD: 4.37 M/UL (ref 4–5.2)
SODIUM BLD-SCNC: 140 MMOL/L (ref 136–145)
T4 FREE: 1.3 NG/DL (ref 0.9–1.8)
TOTAL PROTEIN: 7.3 G/DL (ref 6.4–8.2)
TSH REFLEX: 1.26 UIU/ML (ref 0.27–4.2)
WBC # BLD: 7 K/UL (ref 4–11)

## 2021-05-06 ENCOUNTER — TELEPHONE (OUTPATIENT)
Dept: SURGERY | Age: 26
End: 2021-05-06

## 2021-05-06 NOTE — TELEPHONE ENCOUNTER
Patient called and canceled appointment for tomorrow due to family Emergency. Rescheduled for Monday 05/10/21.

## 2021-05-10 ENCOUNTER — OFFICE VISIT (OUTPATIENT)
Dept: SURGERY | Age: 26
End: 2021-05-10
Payer: COMMERCIAL

## 2021-05-10 VITALS
DIASTOLIC BLOOD PRESSURE: 72 MMHG | HEIGHT: 69 IN | HEART RATE: 91 BPM | BODY MASS INDEX: 43.4 KG/M2 | WEIGHT: 293 LBS | SYSTOLIC BLOOD PRESSURE: 126 MMHG | RESPIRATION RATE: 18 BRPM | OXYGEN SATURATION: 98 %

## 2021-05-10 DIAGNOSIS — N61.1 ABSCESS OF RIGHT BREAST: Primary | ICD-10-CM

## 2021-05-10 PROCEDURE — G8417 CALC BMI ABV UP PARAM F/U: HCPCS | Performed by: NURSE PRACTITIONER

## 2021-05-10 PROCEDURE — 1036F TOBACCO NON-USER: CPT | Performed by: NURSE PRACTITIONER

## 2021-05-10 PROCEDURE — 99213 OFFICE O/P EST LOW 20 MIN: CPT | Performed by: NURSE PRACTITIONER

## 2021-05-10 PROCEDURE — G8427 DOCREV CUR MEDS BY ELIG CLIN: HCPCS | Performed by: NURSE PRACTITIONER

## 2021-05-10 NOTE — PROGRESS NOTES
wheezing. Lymphatics: No palpable supraclavicular, cervical, or axillary lymphadenopathy  Skin: No rash noted. No erythema. Neurologic: alert and oriented. Extremities: appear well perfused. No edema. No joint deformity         Risk assessment using INÉS Breast Cancer Risk Evaluation Tool to evaluate her risk compared to the general population. Her lifetime risk for breast cancer is 15.9% (general population average 13.4%). ASSESSMENT:   · Right Breast Abscess -  Completed 7 days of Doxycycline. Improving. · Hidradenitis suppurative  - history of recurrent painful inflammatory nodules and hypertrophic scarring noted in axillary and groin folds        PLAN:   · Continue to monitor. If flakiness does not resolve will consider punch biopsy of the area to r/o malignancy however, skin changes appear to be consistent with resolving abscess and continuing to improve. · Call the office for any of the concerning symptoms: worsening pain/tenderness, increased firmness, warmth, swelling, skin changes, fever and/or chills. · Will follow up with imaging once abscess has resolved. · Discussed the importance of breast awareness including the importance and technique of self breast exams  · Continue current hidradenitis treatment regimen   · Follow up 3-4 weeks        All of the patient's questions were answered at this time however, she was encouraged to call the office with any further inquiries. Approximately 20 minutes of time were spent in preparation, direct patient contact, counseling, care coordination, documentation and activities otherwise related to this encounter.           Negrito Flynn, CASPER-CNP  [facility])   Surgical Breast Oncology   [phone]

## 2021-07-28 DIAGNOSIS — F41.1 GENERALIZED ANXIETY DISORDER: ICD-10-CM

## 2021-07-29 LAB
A/G RATIO: 1.3 (ref 1.1–2.2)
ALBUMIN SERPL-MCNC: 4.3 G/DL (ref 3.4–5)
ALP BLD-CCNC: 72 U/L (ref 40–129)
ALT SERPL-CCNC: 20 U/L (ref 10–40)
ANION GAP SERPL CALCULATED.3IONS-SCNC: 9 MMOL/L (ref 3–16)
AST SERPL-CCNC: 18 U/L (ref 15–37)
BILIRUB SERPL-MCNC: 0.9 MG/DL (ref 0–1)
BUN BLDV-MCNC: 8 MG/DL (ref 7–20)
CALCIUM SERPL-MCNC: 9.6 MG/DL (ref 8.3–10.6)
CHLORIDE BLD-SCNC: 102 MMOL/L (ref 99–110)
CO2: 25 MMOL/L (ref 21–32)
CREAT SERPL-MCNC: 0.6 MG/DL (ref 0.6–1.1)
GFR AFRICAN AMERICAN: >60
GFR NON-AFRICAN AMERICAN: >60
GLOBULIN: 3.4 G/DL
GLUCOSE BLD-MCNC: 114 MG/DL (ref 70–99)
POTASSIUM SERPL-SCNC: 4.7 MMOL/L (ref 3.5–5.1)
SODIUM BLD-SCNC: 136 MMOL/L (ref 136–145)
TOTAL PROTEIN: 7.7 G/DL (ref 6.4–8.2)

## 2021-07-30 LAB
ESTIMATED AVERAGE GLUCOSE: 131.2 MG/DL
HBA1C MFR BLD: 6.2 %

## 2021-08-02 RX ORDER — VENLAFAXINE HYDROCHLORIDE 37.5 MG/1
CAPSULE, EXTENDED RELEASE ORAL
Qty: 30 CAPSULE | Refills: 0 | Status: SHIPPED | OUTPATIENT
Start: 2021-08-02 | End: 2021-08-30

## 2021-09-28 ENCOUNTER — OFFICE VISIT (OUTPATIENT)
Dept: SURGERY | Age: 26
End: 2021-09-28
Payer: COMMERCIAL

## 2021-09-28 VITALS
SYSTOLIC BLOOD PRESSURE: 114 MMHG | DIASTOLIC BLOOD PRESSURE: 61 MMHG | WEIGHT: 293 LBS | HEART RATE: 93 BPM | OXYGEN SATURATION: 97 % | BODY MASS INDEX: 43.71 KG/M2

## 2021-09-28 DIAGNOSIS — N61.1 ABSCESS OF RIGHT BREAST: Primary | ICD-10-CM

## 2021-09-28 DIAGNOSIS — L73.2 HIDRADENITIS: ICD-10-CM

## 2021-09-28 DIAGNOSIS — N64.4 BREAST PAIN: ICD-10-CM

## 2021-09-28 PROCEDURE — G8427 DOCREV CUR MEDS BY ELIG CLIN: HCPCS | Performed by: NURSE PRACTITIONER

## 2021-09-28 PROCEDURE — G8417 CALC BMI ABV UP PARAM F/U: HCPCS | Performed by: NURSE PRACTITIONER

## 2021-09-28 PROCEDURE — 1036F TOBACCO NON-USER: CPT | Performed by: NURSE PRACTITIONER

## 2021-09-28 PROCEDURE — 99214 OFFICE O/P EST MOD 30 MIN: CPT | Performed by: NURSE PRACTITIONER

## 2021-09-28 RX ORDER — DOXYCYCLINE HYCLATE 100 MG
100 TABLET ORAL 2 TIMES DAILY
Qty: 60 TABLET | Refills: 0 | Status: SHIPPED | OUTPATIENT
Start: 2021-09-28 | End: 2021-10-28

## 2021-09-28 NOTE — PATIENT INSTRUCTIONS
TIPS for MANAGING Hidradenitis suppurative (HS)     DAILY MANAGEMENT TIPS  How can I help manage sores? Keep the affected area clean by gently washing with antibacterial soap. Wear loose-fitting and breathable clothing to reduce friction on your skin. Try not to shave where you have breakouts. Some patients dont shave their underarms. Instead, they use an electric lili to closely trim the hair. Other patients make their own deodorant using essential oils like lavender or tea tree. TIPS FOR WOUND CARE  How can I care for my lesions at home? Always keep your lesions and abscesses clean. Use clean petroleum jelly to keep the wound moist. This keeps it from drying out and forming a scab. Cover the skin with an adhesive bandage. If your skin is sensitive to adhesives, try a non-adhesive gauze pad with paper tape. Change your bandage daily to keep the wound clean while it heals. To help avoid scars, apply sunscreen to the wound after it heals. You can reduce red or brown discoloration, which will help the scar fade faster. If you have more questions about healing wounds, speak to your dermatologist.    TIPS FOR LIFESTYLE CHANGES  What lifestyle changes can help with signs and symptoms of HS? Lose weight, which may decrease the number of areas where your skin rubs together. In between your skin folds there can be friction and increased sweat. Your weight loss won't cure HS, but it may relieve some of your symptoms. Get more exercise. For starters, it will help you lose weight. You might also just feel better about yourself. Eat healthy. Again, eating healthier foods will help you lose weight. Try to avoid processed and fried foods and instead add more fresh fruits and vegetables to your diet. Avoid stress. Though it's not always easy, try to create a more stress-free environment for yourself. Consider meditation, or just 15 minutes of quiet time for yourself.  A calmer you might calm your symptoms. Quit smoking. This isn't the first time you've heard this: there are no benefits to smoking and all kinds of health benefits for stopping. TIPS FOR INFLAMMATION CARE  How do I manage inflammation? Apply warm compresses or a warm washcloth to help reduce swelling. Put a hot tea bag that's been steeped for a minute on the painful abscess or nodule. Try ice or a cold compress for localized pain. Bathe with antibacterial soap (DIAL) and use hypoallergenic products. Use baby washes and lotions that will be gentle on your skin. Website: No BS about HS  [employer]          [facility]: Erika Diez NP   FOR APPOINTMENT CALL  Phone [phone]  Fax [phone]  Address  [facility]. [address] [place] [address]  Located in the rear of the building. Drive to the rear of the building for parking and entry.

## 2021-09-28 NOTE — PROGRESS NOTES
[facility]   Surgical Breast Oncology     CC: Breast Abscess, Right     HPI: Ms. Bhargavi Munroe is a 31 y.o. woman who presents today for f/u of right breast abscess treated with 7days of Doxycycline. She reports redness, swelling, and pain has resolved. Area if soft and not firm. Skin discoloration is continuing to improve back to normal.  Flakiness has resolved. No injury or trauma. Denies fevers/chills. Last week she developed another slightly raised and firm area below the prior abscess site. She reports that she placed a warm compress on it and now it feels better, did not drain, denies pain; thinks it might be a flare up of her HS. She is also having a flare in bilateral groin folds, severe enough that she is having trouble sleeping due to positioning. She denies any other abnormalities such as masses, color changes, nipple discharge, or changes to the nipple-areolar complex. She has not had breast infections in the past, although does have a history of hidradenitis suppurative and has had nodules in her axilla and groin folds. She has never had breast imaging or required a breast biopsy. She has no family history of breast or ovarian cancer. Review of Systems    Past Medical History:   Diagnosis Date    Allergic rhinitis     Anxiety     Asthma     Hidradenitis     History of prediabetes 2012    Iron deficiency anemia     Menorrhagia     Migraine     Obesity     Obstructive sleep apnea     Pre-operative clearance 10/27/2020    Prediabetes 2/17/2020       Past Surgical History:   Procedure Laterality Date    CHOLECYSTECTOMY      TONSILLECTOMY  1998       Allergies as of 09/28/2021 - Fully Reviewed 09/28/2021   Allergen Reaction Noted    Seasonal  10/24/2018       Social History     Tobacco Use    Smoking status: Never Smoker    Smokeless tobacco: Never Used   Vaping Use    Vaping Use: Never used   Substance Use Topics    Alcohol use:  Yes     Alcohol/week: 1.0 standard drinks     Types: 1 Glasses of wine per week     Comment: belongs to a wine-club    Drug use: No         Family History:  No family history of breast or ovarian cancer. Hormonal History:     Menarche at age 14. First pregnancy at age N/A.   premenopausal  Hysterectomy: no hysterectomy  Denies estrogen supplementation  OCP use for 1 month, not currently using     [unfilled]  Medication documentation has been reviewed in the electronic medical record and patient office intake form. REVIEW OF SYSTEMS:  Constitutional: Negative for fever  HENT: Negative for sore throat  Eyes: Negative for redness   Respiratory: Negative for dyspnea, cough  Cardiovascular: Negative for chest pain  Gastrointestinal: Negative for vomiting, diarrhea   Genitourinary: Negative for hematuria   Musculoskeletal: Negative for arthralgias   Skin: Negative for rash  Neurological: Negative for syncope  Hematological: Negative for adenopathy  Psychiatric/Behavorial: Negative for anxiety      EXAM:  /61   Pulse 93   Wt 296 lb (134.3 kg)   SpO2 97%   BMI 43.71 kg/m²   Physical Exam  Constitutional: She appearswell-nourished. No apparent distress. Breast: The patient was examined in the upright and supine position. She has a \"DD\" cup breast. Breasts are symmetrically ptotic. Right: Small residual area that is flat. No erythema or warmth. No flaky skin on exam today. (improved from prior pictures in media tab). No firmness. Inferior to initial site, she has a new similar site that appears to be healing, non-red and non-raised. No skin breakdown. No new signs of infection. No new masses or changes in breast contour. No nipple inversion or discharge. No erythema, thickening (peau d'orange), or dimpling. No significant fluid collection on U/S today in the office. Left: No new masses or changes in breast contour. No skin changes of the breast or nipple areolar complex.   No nipple inversion or discharge. No erythema, thickening (peau d'orange), or dimpling. There is no axillary lymphadenopathy palpated bilaterally. Head: Normocephalic and atraumatic:   Eyes: EOM are normal. Pupils are equal, round, and reactive to light. Neck: Neck supple. No tracheal deviation present. No obvious mass. Lymphatics: No palpable supraclavicular, cervical, or axillary lymphadenopathy  Skin: No rash noted. No erythema. Neurologic: alert and oriented. Extremities: appear well perfused. Risk assessment using INÉS Breast Cancer Risk Evaluation Tool to evaluate her risk compared to the general population. Her lifetime risk for breast cancer is 15.9% (general population average 13.4%). ASSESSMENT:   · Right Breast Abscess -  Completed 7 days of Doxycycline. Improving. · Hidradenitis flare up - has additional breast lesion that is a flare up of HS and also multiple flares in groin per patient report. · Hidradenitis suppurative  - history of recurrent painful inflammatory nodules and hypertrophic scarring noted in axillary and groin folds        PLAN:   · Doxycycline 100 mg PO BID for 30 days for HS suppression  · Referral to dermatology for evaluation and mangment considerations for antibiotics, corticosteroids, topical disinfectants, Hormonal Therapy, Retinoids, immunosuppressants, Biologics has been made in the past.  · Daily Hibiclens baths recommended and bathing with antibacterial soap such as dial   · Wearing loose-fitting clothing - may help prevent excessive friction that can cause lesion irritation or rupture  · Weight loss - may help reduce skin friction and occlusion that can exacerbate HS  · Keeping the skin clean - may help decrease the potential for odor or infection. Several antiseptic cleansers are available and can be used in areas of recurrent lesions.  Washcloths and brushes should be avoided  · Diet modifications - Foods with added sugar, dairy, whey products, foods made with

## 2021-09-29 ENCOUNTER — OFFICE VISIT (OUTPATIENT)
Dept: PRIMARY CARE CLINIC | Age: 26
End: 2021-09-29
Payer: COMMERCIAL

## 2021-09-29 VITALS
OXYGEN SATURATION: 98 % | SYSTOLIC BLOOD PRESSURE: 132 MMHG | BODY MASS INDEX: 43.1 KG/M2 | TEMPERATURE: 97 F | WEIGHT: 291 LBS | HEART RATE: 98 BPM | HEIGHT: 69 IN | DIASTOLIC BLOOD PRESSURE: 88 MMHG

## 2021-09-29 DIAGNOSIS — L73.2 SUPPURATIVE HIDRADENITIS: Primary | ICD-10-CM

## 2021-09-29 DIAGNOSIS — E66.01 MORBID OBESITY WITH BMI OF 40.0-44.9, ADULT (HCC): ICD-10-CM

## 2021-09-29 DIAGNOSIS — J30.1 CHRONIC SEASONAL ALLERGIC RHINITIS DUE TO POLLEN: ICD-10-CM

## 2021-09-29 DIAGNOSIS — R73.03 PREDIABETES: ICD-10-CM

## 2021-09-29 PROCEDURE — G8427 DOCREV CUR MEDS BY ELIG CLIN: HCPCS | Performed by: NURSE PRACTITIONER

## 2021-09-29 PROCEDURE — 99213 OFFICE O/P EST LOW 20 MIN: CPT | Performed by: NURSE PRACTITIONER

## 2021-09-29 PROCEDURE — G8417 CALC BMI ABV UP PARAM F/U: HCPCS | Performed by: NURSE PRACTITIONER

## 2021-09-29 PROCEDURE — 1036F TOBACCO NON-USER: CPT | Performed by: NURSE PRACTITIONER

## 2021-09-29 RX ORDER — MONTELUKAST SODIUM 10 MG/1
TABLET ORAL
Qty: 90 TABLET | Refills: 1 | Status: SHIPPED | OUTPATIENT
Start: 2021-09-29

## 2021-09-29 RX ORDER — CETIRIZINE HYDROCHLORIDE 10 MG/1
TABLET ORAL
Qty: 30 TABLET | Refills: 4 | Status: SHIPPED | OUTPATIENT
Start: 2021-09-29 | End: 2022-05-17

## 2021-09-29 SDOH — ECONOMIC STABILITY: FOOD INSECURITY: WITHIN THE PAST 12 MONTHS, THE FOOD YOU BOUGHT JUST DIDN'T LAST AND YOU DIDN'T HAVE MONEY TO GET MORE.: NEVER TRUE

## 2021-09-29 SDOH — ECONOMIC STABILITY: FOOD INSECURITY: WITHIN THE PAST 12 MONTHS, YOU WORRIED THAT YOUR FOOD WOULD RUN OUT BEFORE YOU GOT MONEY TO BUY MORE.: NEVER TRUE

## 2021-09-29 ASSESSMENT — SOCIAL DETERMINANTS OF HEALTH (SDOH): HOW HARD IS IT FOR YOU TO PAY FOR THE VERY BASICS LIKE FOOD, HOUSING, MEDICAL CARE, AND HEATING?: NOT HARD AT ALL

## 2021-09-29 NOTE — PROGRESS NOTES
Lucila Acevedo (:  1995) is a 31 y.o. female,Established patient, here for evaluation of the following chief complaint(s):  Diabetes (follow up), Check-Up (skin issues, asthma), and Discuss Medications         ASSESSMENT/PLAN:  1. Chronic seasonal allergic rhinitis due to pollen  -     cetirizine (ZYRTEC) 10 MG tablet; TAKE 1 TABLET BY MOUTH EVERY DAY, Disp-30 tablet, R-4Normal  -     montelukast (SINGULAIR) 10 MG tablet; TAKE 1 TABLET BY MOUTH EVERY DAY AT NIGHT, Disp-90 tablet, R-1Normal  2. Suppurative hidradenitis  -     Benzoyl Peroxide (BENZAC AC) 10 % external wash; Apply topically 2 times daily. , Disp-187 g, R-3, Normal  4. Morbid obesity with BMI of 40.0-44.9, adult ([facility] [address]  -The patient is advised to begin progressive daily aerobic exercise program, follow a low fat, low cholesterol diet, attempt to lose weight, reduce exposure to stress, improve dietary compliance, continue current medications and continue current healthy lifestyle patterns. 5. Prediabetes  -Limit sugary foods, drinks and starches      Return in about 3 months (around 2021). Subjective   SUBJECTIVE/OBJECTIVE:  HPI  Anxiety  She reports a history of anxiety, taking medication for several years with effectiveness. Denies side effects of medication.  was hospitalized for Anxiety when initially diagnosed. Reports medication is effective. She wluld like to discontinue the Effexor. States she took while  In college due to a difficult [place] year. Takes at night, if she misses a dose she has the shakes, nausea and diarrhea. She is interested in concieving. Gynecologist recommended she not take Effexor.  Interested in discontinuing medication, unsure if she really needs it but has such bad side effects, takes it not to feel ill.      Sleep Disturbance  Patient relocated from [place] 2 months ago, PCP recommended evaluation for [facility].  Patient  was unable to schedule appointment for sleep study due to [facility] restrictions. She reports awakening at night with high heart rate, choking and gasping for air. Reports had  sleep study at age 11 which was negative. [facility] was not able to keep appointment due to recent death of father. Interested in making an appointment. has sleep device, struggling getting adjusted with machine. Wears 4 hours at night.     Weight Management  Had appointment for Bariatric Provider in [place] for evaluation of surgery prior to relocation. [facility] she is interested in [employer] management services. General weight loss/lifestyle modification strategies discussed (elicit support from others; identify saboteurs; non-food rewards, etc). [facility] she is working out and improved eating habits. At this time hesitant to get gastric surgery for weight loss.    Goals:   -Avoid high fat and high sugar foods  -Include protein with all meals and snacks  -Avoid carbonation and caffeine  -Avoid calorie containing beverages  -Increase physical activity as tolerated   Overweight/Obesity related to lack of exercise, sedentary lifestyle, unhealthy eating habits, and unsuccessful diet attempts as evidenced by BMI.     Wt Readings from Last 3 Encounters:   09/29/21 291 lb (132 kg)   09/28/21 296 lb (134.3 kg)   05/10/21 298 lb 6.4 oz (135.4 kg)        Hidradenitis  She reports a long history of infections, I&D's, drainage from both axilla. [facility] has infection of groin area less often. She reports having I&D and course of antibiotics. Reports improvement in symptoms since use of Hibiclens twice weekly.      Pre-Diabetes  Previously diagnosed with pre-diabetes, stopped taking Metformin. A1c 6.2% in February. Discussed the importance of preventing an increase in A1c and taking Metformin daily to assist with weight control. States she has Metformin medication at home. Gynecologist would like her to lose weight before pregnancy. Has scab on right elbow, initially was a small bump, burst and dranied, now has scabbed over. Taking Doxycycline for cyst on breast, no additional antibiotics at this time. Review of Systems   Constitutional: Negative for activity change and fever. HENT: Negative for congestion. Eyes: Negative for visual disturbance. Respiratory: Negative for chest tightness and shortness of breath. Breast abscess   Cardiovascular: Negative for chest pain, palpitations and leg swelling. Gastrointestinal: Negative for abdominal pain, blood in stool, constipation, diarrhea, nausea and vomiting. Endocrine: Negative for polyuria. Genitourinary: Negative for dysuria and menstrual problem. Musculoskeletal: Negative for arthralgias and myalgias. Skin: Negative for rash. Hidradenitis    Neurological: Negative for dizziness, light-headedness and headaches. Psychiatric/Behavioral: Negative for agitation, decreased concentration and sleep disturbance. The patient is not nervous/anxious.            Objective    Past Medical History:   Diagnosis Date    Allergic rhinitis     Anxiety     Asthma     Hidradenitis     History of prediabetes 2012    Iron deficiency anemia     Menorrhagia     Migraine     Obesity     Obstructive sleep apnea     Pre-operative clearance 10/27/2020    Prediabetes 2/17/2020     Past Surgical History:   Procedure Laterality Date    CHOLECYSTECTOMY      TONSILLECTOMY  1998     Family History   Problem Relation Age of Onset    Other Mother         uterine fibroids    Diabetes Mother     Thyroid Disease Mother     Elevated Lipids Mother     Diabetes Father     Hypertension Father     Stroke Father 64    Diabetes Other     Hypertension Other     Other Other         kidney disease (paternal uncle)    Other Maternal Grandmother         diverticulitis    Diabetes Maternal Grandmother     Other Paternal Grandmother         bone cancer     Heart Attack Paternal Grandmother     Colon Cancer Paternal Grandfather     height is 5' 9\" (1.753 m) and weight is 291 lb (132 kg). Her temporal temperature is 97 °F (36.1 °C). Her blood pressure is 132/88 and her pulse is 98. Her oxygen saturation is 98%. Physical Exam  Vitals reviewed. Constitutional:       Appearance: She is well-developed. HENT:      Head: Normocephalic. Right Ear: Hearing and external ear normal.      Left Ear: Hearing and external ear normal.      Nose: Nose normal.   Eyes:      General: Lids are normal.   Cardiovascular:      Rate and Rhythm: Normal rate and regular rhythm. Heart sounds: Normal heart sounds, S1 normal and S2 normal.   Pulmonary:      Effort: Pulmonary effort is normal.      Breath sounds: Normal breath sounds. Musculoskeletal:         General: Normal range of motion. Skin:     General: Skin is warm and dry. Findings: No rash. Neurological:      Mental Status: She is alert and oriented to person, place, and time. GCS: GCS eye subscore is 4. GCS verbal subscore is 5. GCS motor subscore is 6. Psychiatric:         Speech: Speech normal.         Behavior: Behavior normal. Behavior is cooperative. On this date 9/29/2021 I have spent 25 minutes reviewing previous notes, test results and face to face with the patient discussing the diagnosis and importance of compliance with the treatment plan as well as documenting on the day of the visit. An electronic signature was used to authenticate this note.     --CASPER Peters - CNP

## 2021-10-01 ASSESSMENT — ENCOUNTER SYMPTOMS
CONSTIPATION: 0
NAUSEA: 0
BLOOD IN STOOL: 0
DIARRHEA: 0
VOMITING: 0
ABDOMINAL PAIN: 0
ROS SKIN COMMENTS: HIDRADENITIS
CHEST TIGHTNESS: 0
SHORTNESS OF BREATH: 0

## 2021-11-30 ENCOUNTER — OFFICE VISIT (OUTPATIENT)
Dept: BARIATRICS/WEIGHT MGMT | Age: 26
End: 2021-11-30
Payer: COMMERCIAL

## 2021-11-30 VITALS
OXYGEN SATURATION: 98 % | HEIGHT: 68 IN | WEIGHT: 291 LBS | BODY MASS INDEX: 44.1 KG/M2 | RESPIRATION RATE: 18 BRPM | SYSTOLIC BLOOD PRESSURE: 124 MMHG | HEART RATE: 101 BPM | DIASTOLIC BLOOD PRESSURE: 73 MMHG

## 2021-11-30 DIAGNOSIS — K21.9 CHRONIC GERD: ICD-10-CM

## 2021-11-30 DIAGNOSIS — D50.9 IRON DEFICIENCY ANEMIA, UNSPECIFIED IRON DEFICIENCY ANEMIA TYPE: ICD-10-CM

## 2021-11-30 DIAGNOSIS — G47.33 OBSTRUCTIVE SLEEP APNEA: ICD-10-CM

## 2021-11-30 DIAGNOSIS — E66.01 MORBID OBESITY WITH BMI OF 40.0-44.9, ADULT (HCC): Primary | ICD-10-CM

## 2021-11-30 DIAGNOSIS — R73.03 PREDIABETES: ICD-10-CM

## 2021-11-30 PROCEDURE — 99215 OFFICE O/P EST HI 40 MIN: CPT | Performed by: SURGERY

## 2021-11-30 PROCEDURE — G8417 CALC BMI ABV UP PARAM F/U: HCPCS | Performed by: SURGERY

## 2021-11-30 PROCEDURE — G8484 FLU IMMUNIZE NO ADMIN: HCPCS | Performed by: SURGERY

## 2021-11-30 PROCEDURE — G8427 DOCREV CUR MEDS BY ELIG CLIN: HCPCS | Performed by: SURGERY

## 2021-11-30 PROCEDURE — 1036F TOBACCO NON-USER: CPT | Performed by: SURGERY

## 2021-11-30 NOTE — Clinical Note
Greatly appreciate the referral.  Excellent candidate for weight loss. We will keep you posted on Leonela martines.

## 2021-11-30 NOTE — PATIENT INSTRUCTIONS
Patient received dietary handouts and education.        -Plan for Laparoscopic sleeve gastrectomy      Pre-operative work up Ordered:    - Nutrition Labs. - Psych Evaluation.   - Cardiac Clearance. - CPAP Compliance. - EGD (endoscopy to check your stomach). - Support Group Attendance. - Obtain letter of medical necessity (PCP Letter). - Quit Smoking,  Alcohol, Caffeine and Carbonated Drinks  - Obtain records for Weight History 2 yrs. - Start Regular Exercise and track your activities. - Start Tracking your food Intake and follow dietary guidelines. - Avoid Pregnancy for 2 yrs from date of surgery. (for female patients in childbearing age)  - F/U in 4 weeks. - Referral to Behaviorist.            Patient advised that its their responsibility to follow up for studies, referrals and/or labs ordered today.

## 2021-11-30 NOTE — PROGRESS NOTES
Maida Roberts lost 10 lbs over past 13 months. Pt reported her father passed away from a blood clot and was scared about proceeding with sx, but reports now feels more comfortable about it. Breakfast: usually skips d/t not being hungry     Snack: usually skips d/t not being hungry     Lunch: skips OR leftover dinner - lasagna OR spaghetti OR take out - chipotle (1/2 bowl - chicken/rice/beans/corn)/panera     Snack: nothing     Dinner: leftover dinner - lasagna OR spaghetti OR take out - chipotle (1/2 bowl - chicken/rice/beans/corn)/panera     Snack: nothing     Is pt consuming smaller portions? Needs to decrease     Is pt consuming at least 64 oz of fluids per day? Not always     Is pt consuming carbonated, caffeinated, or sugary beverages? Yes - Water, cranberry juice     Has pt sampled Unjury and/or Nectar protein? Not yet, discussed today     Has patient attended a support group? Not scheduled     Exercise: elliptical and cardio 2 x week     Plan/Recommendations: Focus on smaller frequent meals eating 4 x day.    Avoid juice   Focus on 9 inch plate method     Handouts: NP packet, presurgical diet eating guide, cooking resources for surgical weight loss    Js Montemayor RD, LD

## 2021-12-01 ENCOUNTER — TELEPHONE (OUTPATIENT)
Dept: PRIMARY CARE CLINIC | Age: 26
End: 2021-12-01

## 2021-12-01 NOTE — TELEPHONE ENCOUNTER
----- Message from Maurice Lazcano sent at 11/30/2021  3:49 PM EST -----  Subject: Message to Provider    QUESTIONS  Information for Provider? Patient is going through the pre-op process for   weight loss surgery, the doctor (Dr. Jo Ann Lewis) request a letter   stating that James Parks recommended and approves her having the   surgery. Letter should state why patient would be a candidate. Please   contact patient.  ---------------------------------------------------------------------------  --------------  CALL BACK INFO  What is the best way for the office to contact you? OK to leave message on   voicemail  Preferred Call Back Phone Number? [phone]  ---------------------------------------------------------------------------  --------------  SCRIPT ANSWERS  Relationship to Patient?  Self

## 2021-12-02 NOTE — PROGRESS NOTES
[facility]) Physicians   [facility]  Chasity Gotti MD, [address] [facility], [address] [place] [address]    [place]  [id number] . Phone: [phone]  Fax: [phone]       Chief Complaint   Patient presents with    Bariatric, Initial Visit     restart 2nd pre-surg           HPI:    Bianca Santoyo is a very pleasant 31 y.o. obese female ,   Body mass index is 44.25 kg/m². And multiple medical problems who is presenting for weight loss surgery evaluation and consultation by Dr. Yassine Echeverria. Patient has been struggling for several years now with obesity. Patient feels the weight is an obstacle to achieve and perform things in daily living as well risk on health. Tries to diet, and exercise but can't keep the weight off. Patient tried several regimens, but with no sustainable weight loss. Patient  is very determined to lose weight and be healthy, and is interested in surgical weight loss for future weight loss. .    Otherwise patient denies any nausea, vomiting, fevers, chills, shortness of breath, chest pain, constipation or urinary symptoms.         Obesity related problems Vineet La is dealing with:  Patient Active Problem List   Diagnosis    Asthma    Iron deficiency anemia    Lichen simplex    Menorrhagia with regular cycle    Generalized anxiety disorder    Seasonal allergic rhinitis due to pollen    Hidradenitis    Palpitations    Abscess of groin, right    Menorrhagia    Prediabetes    Obesity    Obstructive sleep apnea    PLMD (periodic limb movement disorder)    Preoperative clearance    Morbid obesity with BMI of 45.0-49.9, adult (HCC)    Postprandial abdominal bloating    Morbid obesity with BMI of 40.0-44.9, adult (HCC)    Chronic GERD           Pain Assessment   Denies any abdominal pain     Past Medical History:   Diagnosis Date    Allergic rhinitis     Anxiety     Asthma     Chronic GERD 11/30/2021    Hidradenitis     History of prediabetes 2012  Iron deficiency anemia     Menorrhagia     Migraine     Obesity     Obstructive sleep apnea     Pre-operative clearance 10/27/2020    Prediabetes 2/17/2020     Past Surgical History:   Procedure Laterality Date    CHOLECYSTECTOMY      TONSILLECTOMY  1998     Family History   Problem Relation Age of Onset    Other Mother         uterine fibroids    Diabetes Mother     Thyroid Disease Mother     Elevated Lipids Mother     Diabetes Father     Hypertension Father     Stroke Father 64    Diabetes Other     Hypertension Other     Other Other         kidney disease (paternal uncle)    Other Maternal Grandmother         diverticulitis    Diabetes Maternal Grandmother     Other Paternal Grandmother         bone cancer     Heart Attack Paternal Grandmother     Colon Cancer Paternal Grandfather      Social History     Tobacco Use    Smoking status: Never Smoker    Smokeless tobacco: Never Used   Substance Use Topics    Alcohol use: Yes     Alcohol/week: 1.0 standard drink     Types: 1 Glasses of wine per week     Comment: belongs to a wine-club         I counseled the patient on the risks of Smoking, ETOH or Drug use, and importance of completely avoiding them, otherwise patient risks surgery cancellation or post operative serious complications if they start using any. Jean Castle acknowledged, agreed not to use and wants to proceed. Allergies   Allergen Reactions    Seasonal      Vitals:    11/30/21 1315   BP: 124/73   Pulse: 101   Resp: 18   SpO2: 98%   Weight: 291 lb (132 kg)   Height: 5' 8\" (1.727 m)       Body mass index is 44.25 kg/m².       Current Outpatient Medications:     venlafaxine (EFFEXOR XR) 37.5 MG extended release capsule, TAKE 1 CAPSULE BY MOUTH EVERY DAY, Disp: 30 capsule, Rfl: 3    metFORMIN (GLUCOPHAGE-XR) 500 MG extended release tablet, Take 1 tablet by mouth daily (with breakfast) (Patient taking differently: Take 500 mg by mouth 2 times daily ), Disp: 90 are equal, round, and reactive to light. Right eye exhibits no discharge. No foreign body present in the right eye. Left eye exhibits no discharge. No foreign body present in the left eye. No scleral icterus. Neck: Trachea normal and normal range of motion. Neck supple. No JVD present. No tracheal tenderness present. Carotid bruit is not present. No rigidity. No tracheal deviation and no edema present. No thyromegaly present. Cardiovascular: Normal rate, regular rhythm, normal heart sounds, intact distal pulses and normal pulses. Pulmonary/Chest: Effort normal and breath sounds normal. No stridor. No respiratory distress. Patient  has no wheezes. Patient has no rales. Patient exhibits no tenderness and no crepitus. Abdominal: Soft. Normal appearance and bowel sounds are normal. Patient exhibits no distension, no abdominal bruit, no ascites and no mass. There is no hepatosplenomegaly. There is no tenderness. There is no rigidity, no rebound, no guarding and no CVA tenderness. No hernia. Hernia confirmed negative in the ventral area. Musculoskeletal: Normal range of motion. Patient exhibits no edema or tenderness. Lymphadenopathy:        Head (right side): No submental, no submandibular, no preauricular, no posterior auricular and no occipital adenopathy present. Head (left side): No submental, no submandibular, no preauricular, no posterior auricular and no occipital adenopathy present. Patient  has no cervical adenopathy. Right: No supraclavicular adenopathy present. Left: No supraclavicular adenopathy present. Neurological: Patient is alert and oriented to person, place, and time. Patient has normal strength. Coordination and gait normal. GCS eye subscore is 4. GCS verbal subscore is 5. GCS motor subscore is 6. Skin: Skin is warm and dry. No abrasion and no rash noted. Patient  is not diaphoretic. No cyanosis or erythema. Psychiatric: Patient has a normal mood and affect. Reflex; Future  -     Vitamin A; Future  -     Vitamin B1, Whole Blood; Future  -     Vitamin B12 & Folate; Future  -     Vitamin D 25 Hydroxy; Future  -     Vitamin E; Future  -     Protime-INR; Future  -     Ambulatory referral to Cardiology  -     Ambulatory referral to Sleep Medicine    Chronic GERD  -     CBC Auto Differential; Future  -     Comprehensive Metabolic Panel; Future  -     Hemoglobin A1C; Future  -     Iron and TIBC; Future  -     Lipid Panel; Future  -     TSH with Reflex; Future  -     Vitamin A; Future  -     Vitamin B1, Whole Blood; Future  -     Vitamin B12 & Folate; Future  -     Vitamin D 25 Hydroxy; Future  -     Vitamin E; Future  -     Protime-INR; Future  -     Ambulatory referral to Cardiology  -     Ambulatory referral to Sleep Medicine          A/P  Jayshree Moody is a very pleasant 31 y.o. female with Obesity,  Body mass index is 44.25 kg/m². and multiple obesity related co-morbidities. Jayshree Moody is very motivated to lose weight and being more healthy. We discussed how her weight affects her overall health including:  Patient Active Problem List   Diagnosis    Asthma    Iron deficiency anemia    Lichen simplex    Menorrhagia with regular cycle    Generalized anxiety disorder    Seasonal allergic rhinitis due to pollen    Hidradenitis    Palpitations    Abscess of groin, right    Menorrhagia    Prediabetes    Obesity    Obstructive sleep apnea    PLMD (periodic limb movement disorder)    Preoperative clearance    Morbid obesity with BMI of 45.0-49.9, adult (HCC)    Postprandial abdominal bloating    Morbid obesity with BMI of 40.0-44.9, adult (HCC)    Chronic GERD          The patient underwent extensive dietary counseling with the registered dietitian. I have reviewed, discussed and agree with the dietary plan. Medical weight loss and different surgical options were discussed in details with patient.  [facility] is interested in surgical weight loss for future weight loss. Patient is interested in Laparoscopic Sleeve Gastrectomy, which I believe is an excellent option. I explained to the patient that surgery does carry a risk specially with the coexisting comorbid conditions the patient have. Surgery as well in obese patiens can carry more risk. Risks including but not limited to; Infection, bleeding, gastric leak or obstruction, persistent nausea, vomiting, or reflux, injury to surrounding structures, risks of anesthesia, stricture, delayed gastric emptying, staple line leak, incisional hernia, malnutrition , hair loss, and/ or Conversion to Open surgery may be necessary. Failure to lose or maintain weight loss, Gallstones or Kidney Stones, Deep Venous Thrombosis , pulmonary embolism and / or death. However I do believe the benefits outweighs that risk. Tab Dillard understands the risks and wants to proceed. We will proceed with pre-operative work up labs and studies. Will also petition patient's  insurance for approval for this procedure. I advised the patient that we can't guarantee final insurance approval.    Patient received dietary handouts and education. Patient advised that its their responsibility to follow up for studies, referrals and/or labs ordered today. Also discussed in details the importance of follow up, as well following the recommendations and completing the whole program to improve outcomes when it comes to healthier lifestyle as well weight loss. Patient also advised about risks and benefits being on a strict dietary regimen as well using supplements.  Patient agrees and wants to proceed with weight loss planning     Today's encounter included any number of the following: Bariatric Pre/Post operative work up/protocols, review of labs, imaging, provider notes, outside hospital records, performing examination/evaluation, counseling patient and/or family, ordering medications/tests, placing referrals and communication with referring physicians, coordination of care; discussing dietary plan/recall with the patient as well with registered dietitian and documentation in the EHR. Of note, the above was done during same day of the actual patient encounter. Obesity as a disease is considered a high risk to patients overall health and should therefore be considered a high risk disease state. Advised the patient that not getting there weight under control (weight loss hopefully will help with resolving/improving of the comorbid conditions),  that could increase risk of complications/worsening of those conditions on the long-term. Now with Covid-19 pandemic, CDC and health authorities does classify obese patients as vulnerable and high risk as well. Which makes weight loss a priority for improvement of their wellbeing and overall health. CDC has issued the following statement as far Obese patients being at Increased Risk of being critically ill from SARS-Cov-2  \"Severe obesity increases the risk of a serious breathing problem called acute respiratory distress syndrome (ARDS), which is a major complication of LYOMC-18 and can cause difficulties with a doctors ability to provide respiratory support for seriously ill patients. People living with severe obesity can have multiple serious chronic diseases and underlying health conditions that can increase the risk of severe illness from COVID-19. \"       Patient Instructions   Patient received dietary handouts and education.        -Plan for Laparoscopic sleeve gastrectomy      Pre-operative work up Ordered:    - Nutrition Labs. - Psych Evaluation.   - Cardiac Clearance. - CPAP Compliance. - EGD (endoscopy to check your stomach). - Support Group Attendance. - Obtain letter of medical necessity (PCP Letter). - Quit Smoking,  Alcohol, Caffeine and Carbonated Drinks  - Obtain records for Weight History 2 yrs.    - Start Regular Exercise and track your activities. - Start Tracking your food Intake and follow dietary guidelines. - Avoid Pregnancy for 2 yrs from date of surgery. (for female patients in childbearing age)  - F/U in 4 weeks. - Referral to Behaviorist.            Patient advised that its their responsibility to follow up for studies, referrals and/or labs ordered today. Please note that some or all of this report was generated using voice recognition software. Please notify me in case of any questions about the content of this document, as some errors in transcription may have occurred .

## 2021-12-06 ENCOUNTER — TELEPHONE (OUTPATIENT)
Dept: PRIMARY CARE CLINIC | Age: 26
End: 2021-12-06

## 2021-12-06 NOTE — TELEPHONE ENCOUNTER
Just took Doxycycline for a month for hidradenitis w/o much relief. Is there another antibiotic you could prescribe for this? BODØ is off. Would you send something for her. She is in a lot of pain.

## 2021-12-06 NOTE — TELEPHONE ENCOUNTER
PT called in stating that she has a skin condition hidradenitis that she is experiencing a breakout and would like to know if something can be called into the [facility] on [address].      Best call back number: [phone]

## 2021-12-08 RX ORDER — CEPHALEXIN 500 MG/1
500 CAPSULE ORAL 3 TIMES DAILY
Qty: 30 CAPSULE | Refills: 1 | Status: SHIPPED | OUTPATIENT
Start: 2021-12-08 | End: 2021-12-15

## 2021-12-08 NOTE — TELEPHONE ENCOUNTER
Rx E scribed to pharmacy. The patient has been notified of this information and all questions answered.

## 2021-12-15 ENCOUNTER — HOSPITAL ENCOUNTER (OUTPATIENT)
Age: 26
Discharge: HOME | End: 2021-12-15
Payer: COMMERCIAL

## 2021-12-15 DIAGNOSIS — G47.33 OBSTRUCTIVE SLEEP APNEA: ICD-10-CM

## 2021-12-15 DIAGNOSIS — D50.9 IRON DEFICIENCY ANEMIA, UNSPECIFIED IRON DEFICIENCY ANEMIA TYPE: ICD-10-CM

## 2021-12-15 DIAGNOSIS — E66.01 MORBID OBESITY WITH BMI OF 40.0-44.9, ADULT (HCC): ICD-10-CM

## 2021-12-15 DIAGNOSIS — R73.03 PREDIABETES: ICD-10-CM

## 2021-12-15 DIAGNOSIS — K21.9 CHRONIC GERD: ICD-10-CM

## 2021-12-15 LAB
A/G RATIO: 1.2 (ref 1.1–2.2)
ALBUMIN SERPL-MCNC: 4.2 G/DL (ref 3.4–5)
ALP BLD-CCNC: 68 U/L (ref 40–129)
ALT SERPL-CCNC: 17 U/L (ref 10–40)
ANION GAP SERPL CALCULATED.3IONS-SCNC: 10 MMOL/L (ref 3–16)
AST SERPL-CCNC: 18 U/L (ref 15–37)
BILIRUB SERPL-MCNC: 0.8 MG/DL (ref 0–1)
BUN BLDV-MCNC: 7 MG/DL (ref 7–20)
CALCIUM SERPL-MCNC: 9.1 MG/DL (ref 8.3–10.6)
CHLORIDE BLD-SCNC: 102 MMOL/L (ref 99–110)
CHOLESTEROL, TOTAL: 162 MG/DL (ref 0–199)
CO2: 24 MMOL/L (ref 21–32)
CREAT SERPL-MCNC: 0.5 MG/DL (ref 0.6–1.1)
FOLATE: 15.09 NG/ML (ref 4.78–24.2)
GFR AFRICAN AMERICAN: >60
GFR NON-AFRICAN AMERICAN: >60
GLUCOSE BLD-MCNC: 96 MG/DL (ref 70–99)
HDLC SERPL-MCNC: 45 MG/DL (ref 40–60)
INR BLD: 1.05 (ref 0.88–1.12)
IRON SATURATION: 7 % (ref 15–50)
IRON: 25 UG/DL (ref 37–145)
LDL CHOLESTEROL CALCULATED: 99 MG/DL
POTASSIUM SERPL-SCNC: 4.1 MMOL/L (ref 3.5–5.1)
PROTHROMBIN TIME: 11.9 SEC (ref 9.9–12.7)
SODIUM BLD-SCNC: 136 MMOL/L (ref 136–145)
TOTAL IRON BINDING CAPACITY: 382 UG/DL (ref 260–445)
TOTAL PROTEIN: 7.8 G/DL (ref 6.4–8.2)
TRIGL SERPL-MCNC: 92 MG/DL (ref 0–150)
TSH REFLEX: 1.06 UIU/ML (ref 0.27–4.2)
VITAMIN B-12: 557 PG/ML (ref 211–911)
VITAMIN D 25-HYDROXY: 6.4 NG/ML
VLDLC SERPL CALC-MCNC: 18 MG/DL

## 2021-12-15 PROCEDURE — 82607 VITAMIN B-12: CPT

## 2021-12-15 PROCEDURE — 85025 COMPLETE CBC W/AUTO DIFF WBC: CPT

## 2021-12-15 PROCEDURE — 84590 ASSAY OF VITAMIN A: CPT

## 2021-12-15 PROCEDURE — 82746 ASSAY OF FOLIC ACID SERUM: CPT

## 2021-12-15 PROCEDURE — 84446 ASSAY OF VITAMIN E: CPT

## 2021-12-15 PROCEDURE — 36415 COLL VENOUS BLD VENIPUNCTURE: CPT

## 2021-12-15 PROCEDURE — 83550 IRON BINDING TEST: CPT

## 2021-12-15 PROCEDURE — 83036 HEMOGLOBIN GLYCOSYLATED A1C: CPT

## 2021-12-15 PROCEDURE — 82306 VITAMIN D 25 HYDROXY: CPT

## 2021-12-15 PROCEDURE — 84425 ASSAY OF VITAMIN B-1: CPT

## 2021-12-15 PROCEDURE — 80061 LIPID PANEL: CPT

## 2021-12-15 PROCEDURE — 84443 ASSAY THYROID STIM HORMONE: CPT

## 2021-12-15 PROCEDURE — 80053 COMPREHEN METABOLIC PANEL: CPT

## 2021-12-15 PROCEDURE — 83540 ASSAY OF IRON: CPT

## 2021-12-15 PROCEDURE — 85610 PROTHROMBIN TIME: CPT

## 2021-12-16 LAB
BASOPHILS ABSOLUTE: 0 K/UL (ref 0–0.2)
BASOPHILS RELATIVE PERCENT: 0.2 %
EOSINOPHILS ABSOLUTE: 0.1 K/UL (ref 0–0.6)
EOSINOPHILS RELATIVE PERCENT: 1.7 %
ESTIMATED AVERAGE GLUCOSE: 134.1 MG/DL
HBA1C MFR BLD: 6.3 %
HCT VFR BLD CALC: 29.5 % (ref 36–48)
HEMATOLOGY PATH CONSULT: NO
HEMOGLOBIN: 9.3 G/DL (ref 12–16)
LYMPHOCYTES ABSOLUTE: 2.2 K/UL (ref 1–5.1)
LYMPHOCYTES RELATIVE PERCENT: 39.7 %
MCH RBC QN AUTO: 20.8 PG (ref 26–34)
MCHC RBC AUTO-ENTMCNC: 31.3 G/DL (ref 31–36)
MCV RBC AUTO: 66.4 FL (ref 80–100)
MONOCYTES ABSOLUTE: 0.4 K/UL (ref 0–1.3)
MONOCYTES RELATIVE PERCENT: 6.7 %
NEUTROPHILS ABSOLUTE: 2.8 K/UL (ref 1.7–7.7)
NEUTROPHILS RELATIVE PERCENT: 51.7 %
PDW BLD-RTO: 16.1 % (ref 12.4–15.4)
PLATELET # BLD: 354 K/UL (ref 135–450)
PMV BLD AUTO: 7.9 FL (ref 5–10.5)
RBC # BLD: 4.45 M/UL (ref 4–5.2)
WBC # BLD: 5.5 K/UL (ref 4–11)

## 2021-12-17 ENCOUNTER — OFFICE VISIT (OUTPATIENT)
Dept: BARIATRICS/WEIGHT MGMT | Age: 26
End: 2021-12-17
Payer: COMMERCIAL

## 2021-12-17 VITALS
SYSTOLIC BLOOD PRESSURE: 129 MMHG | HEART RATE: 93 BPM | WEIGHT: 293 LBS | HEIGHT: 68 IN | OXYGEN SATURATION: 98 % | DIASTOLIC BLOOD PRESSURE: 79 MMHG | BODY MASS INDEX: 44.41 KG/M2 | RESPIRATION RATE: 18 BRPM

## 2021-12-17 DIAGNOSIS — E66.01 MORBID OBESITY WITH BMI OF 45.0-49.9, ADULT (HCC): Primary | ICD-10-CM

## 2021-12-17 DIAGNOSIS — R73.03 PREDIABETES: ICD-10-CM

## 2021-12-17 DIAGNOSIS — G47.33 OBSTRUCTIVE SLEEP APNEA: ICD-10-CM

## 2021-12-17 DIAGNOSIS — K21.9 CHRONIC GERD: ICD-10-CM

## 2021-12-17 DIAGNOSIS — D50.0 IRON DEFICIENCY ANEMIA DUE TO CHRONIC BLOOD LOSS: ICD-10-CM

## 2021-12-17 DIAGNOSIS — E55.9 VITAMIN D DEFICIENCY: ICD-10-CM

## 2021-12-17 PROCEDURE — G8417 CALC BMI ABV UP PARAM F/U: HCPCS | Performed by: SURGERY

## 2021-12-17 PROCEDURE — 99214 OFFICE O/P EST MOD 30 MIN: CPT | Performed by: SURGERY

## 2021-12-17 PROCEDURE — G8484 FLU IMMUNIZE NO ADMIN: HCPCS | Performed by: SURGERY

## 2021-12-17 PROCEDURE — G8427 DOCREV CUR MEDS BY ELIG CLIN: HCPCS | Performed by: SURGERY

## 2021-12-17 PROCEDURE — 1036F TOBACCO NON-USER: CPT | Performed by: SURGERY

## 2021-12-17 RX ORDER — FERROUS SULFATE 325(65) MG
325 TABLET ORAL 2 TIMES DAILY WITH MEALS
Qty: 60 TABLET | Refills: 3 | Status: SHIPPED | OUTPATIENT
Start: 2021-12-17 | End: 2022-02-07 | Stop reason: SDUPTHER

## 2021-12-17 NOTE — PROGRESS NOTES
Maureen Patel gained 6 lbs over 2.5 weeks. Pt reports cutting out sugar and avoiding fast foods. Wake up 8:30-9 AM  Breakfast: Skips  Lunch: 12-1 PM: Salad w/ chix/feta/walnuts/craisins/ 1/2 pack ranch + 1/2 pack raspberry vinegrette dressing OR leftovers  Snack: None  Dinner: 7 PM: Chix Plascencia w/ greek yogurt/yuridia/ 1/2 kathi  Snack: 9-9:30 PM: Apple OR chix salad w/ grapes/apple/walnuts/avocaod ryan + crackers  Bed around 11:30-12 AM, feels rested, has sleep f/u soon    Is pt consuming smaller portions? using pre-made items, increasing veggies focused on more veggies    Is pt consuming at least 64 oz of fluids per day? yes 3-4 bottles water     Is pt consuming carbonated, caffeinated, or sugary beverages? yes   cranberry juice occasionally, 1-2 cup decaf tea w/ honey    Has pt sampled Unjury and/or Nectar protein? Encouraged pt to try grab'n'go's before next appointment, samples provided today    Has patient attended a support group?  Not scheduled     Exercise: Not this week d/t moving    Plan/Recommendations:   - Start tracking 3520-4145 kcal, 60-90 grams protein, 60-70 grams fat per day  - Aim for 2-3 days of exercise 30-45 minutes each   - Try protein powder for breakfast  - Schedule support group     Handouts: SG schedule     Marycarmen Robertson, RD, LD

## 2021-12-17 NOTE — PATIENT INSTRUCTIONS
Patient received dietary handouts and education.     Plan/Recommendations:   - Start tracking 6204-6348 kcal, 60-90 grams protein, 60-70 grams fat per day  - Aim for 2-3 days of exercise 30-45 minutes each   - Try protein powder for breakfast  - Schedule support group

## 2021-12-17 NOTE — PROGRESS NOTES
[facility]) Physicians   [employer] [facility] [employer]  Alexandria Pittman MD, [facility], [address] [place] [address]    [place]  [address] . Phone: [phone]  Fax: [phone]          Chief Complaint   Patient presents with    Obesity     3rd pre-surg         HPI:     Tisha Hassan is a very pleasant 31 y.o. female with Body mass index is 45.16 kg/m². / Chronic Obesity. Hugh Jhaveri has been struggling for several years now with obesity. Hugh Jhaveri feels the weight is an obstacle to achieve and perform things in daily living as well risk on health. Patient  is very determined to lose weight and be healthy, and is working towards  surgical weight loss to achieve this goal. Pre-operative clearance and work up pending. Working hard to keep good dietary habits as well level of activity. Patient denies any nausea, vomiting, fevers, chills, shortness of breath, chest pain, cough, constipation or difficulty urinating.     Pain Assessment   Denies any abdominal pain       Past Medical History:   Diagnosis Date    Allergic rhinitis     Anxiety     Asthma     Chronic GERD 11/30/2021    Hidradenitis     History of prediabetes 2012    Iron deficiency anemia     Menorrhagia     Migraine     Obesity     Obstructive sleep apnea     Pre-operative clearance 10/27/2020    Prediabetes 2/17/2020     Past Surgical History:   Procedure Laterality Date    CHOLECYSTECTOMY      TONSILLECTOMY  1998     Family History   Problem Relation Age of Onset    Other Mother         uterine fibroids    Diabetes Mother     Thyroid Disease Mother     Elevated Lipids Mother     Diabetes Father     Hypertension Father     Stroke Father 64    Diabetes Other     Hypertension Other     Other Other         kidney disease (paternal uncle)    Other Maternal Grandmother         diverticulitis    Diabetes Maternal Grandmother     Other Paternal Grandmother         bone cancer     Heart Attack Paternal Grandmother     Colon Cancer Paternal Grandfather      Social History     Tobacco Use    Smoking status: Never Smoker    Smokeless tobacco: Never Used   Substance Use Topics    Alcohol use: Yes     Alcohol/week: 1.0 standard drink     Types: 1 Glasses of wine per week     Comment: belongs to a wine-club     I counseled the patient on the importance of not smoking and risks of ETOH. Allergies   Allergen Reactions    Seasonal      Vitals:    12/17/21 0925   BP: 129/79   Pulse: 93   Resp: 18   SpO2: 98%   Weight: 297 lb (134.7 kg)   Height: 5' 8\" (1.727 m)       Body mass index is 45.16 kg/m².     Lab Results   Component Value Date    WBC 5.5 12/15/2021    RBC 4.45 12/15/2021    RBC 4.87 02/14/2020    HGB 9.3 12/15/2021    HCT 29.5 12/15/2021    MCV 66.4 12/15/2021    MCH 20.8 12/15/2021    MCHC 31.3 12/15/2021    MPV 7.9 12/15/2021    NEUTOPHILPCT 51.7 12/15/2021    LYMPHOPCT 39.7 12/15/2021    LYMPHOPCT 31.9 02/14/2020    MONOPCT 6.7 12/15/2021    EOSRELPCT 1.7 12/15/2021    BASOPCT 0.2 12/15/2021    NEUTROABS 2.8 12/15/2021    LYMPHSABS 2.2 12/15/2021    MONOSABS 0.4 12/15/2021    EOSABS 0.1 12/15/2021     Lab Results   Component Value Date     12/15/2021    K 4.1 12/15/2021    K 3.8 11/13/2020     12/15/2021    CO2 24 12/15/2021    ANIONGAP 10 12/15/2021    GLUCOSE 96 12/15/2021    BUN 7 12/15/2021    CREATININE 0.5 12/15/2021    LABGLOM >60 12/15/2021    GFRAA >60 12/15/2021    CALCIUM 9.1 12/15/2021    PROT 7.8 12/15/2021    LABALBU 4.2 12/15/2021    AGRATIO 1.2 12/15/2021    BILITOT 0.8 12/15/2021    ALKPHOS 68 12/15/2021    ALT 17 12/15/2021    AST 18 12/15/2021    GLOB 3.4 07/29/2021     Lab Results   Component Value Date    CHOL 162 12/15/2021    TRIG 92 12/15/2021    HDL 45 12/15/2021    LDLCALC 99 12/15/2021    LABVLDL 18 12/15/2021     Lab Results   Component Value Date    TSHREFLEX 1.06 12/15/2021     Lab Results   Component Value Date    IRON 25 12/15/2021    TIBC 382 12/15/2021    LABIRON 7 12/15/2021 Lab Results   Component Value Date    NMXEQLDB77 685 12/15/2021    FOLATE 15.09 12/15/2021     Lab Results   Component Value Date    VITD25 6.4 12/15/2021     Lab Results   Component Value Date    LABA1C 6.3 12/15/2021    .1 12/15/2021         Current Outpatient Medications:     Benzoyl Peroxide (BENZAC AC) 10 % external wash, Apply topically 2 times daily. , Disp: 187 g, Rfl: 3    cetirizine (ZYRTEC) 10 MG tablet, TAKE 1 TABLET BY MOUTH EVERY DAY, Disp: 30 tablet, Rfl: 4    montelukast (SINGULAIR) 10 MG tablet, TAKE 1 TABLET BY MOUTH EVERY DAY AT NIGHT, Disp: 90 tablet, Rfl: 1    venlafaxine (EFFEXOR XR) 37.5 MG extended release capsule, TAKE 1 CAPSULE BY MOUTH EVERY DAY, Disp: 30 capsule, Rfl: 3    albuterol sulfate HFA (VENTOLIN HFA) 108 (90 Base) MCG/ACT inhaler, Inhale 2 puffs into the lungs every 6 hours as needed for Wheezing, Disp: 1 Inhaler, Rfl: 3    metFORMIN (GLUCOPHAGE-XR) 500 MG extended release tablet, Take 1 tablet by mouth daily (with breakfast) (Patient taking differently: Take 500 mg by mouth 2 times daily ), Disp: 90 tablet, Rfl: 1    ferrous sulfate (FE TABS) 325 (65 Fe) MG EC tablet, Take 1 tablet by mouth daily (with breakfast) (take with VitC for improved absorption), Disp: 60 tablet, Rfl: 3    fluticasone (FLONASE) 50 MCG/ACT nasal spray, SHAKE LIQUID AND USE 1 SPRAY IN EACH NOSTRIL DAILY, Disp: 1 Bottle, Rfl: 2    vitamin D (CHOLECALCIFEROL) 08042 UNIT CAPS, Take 1 capsule by mouth once a week, Disp: 12 capsule, Rfl: 0    Cholecalciferol 50 MCG (2000 UT) TABS, Take 1 tablet by mouth daily Take 1 tablet by mouth daily.  Start this medication after you finish the weekly regimen, Disp: 90 tablet, Rfl: 2    ferrous sulfate (IRON 325) 325 (65 Fe) MG tablet, Take 1 tablet by mouth 2 times daily (with meals), Disp: 60 tablet, Rfl: 3    Review of Systems - History obtained from the patient  General ROS: negative  Psychological ROS: negative  Ophthalmic ROS: negative  Neurological ROS: negative  ENT ROS: negative  Allergy and Immunology ROS: negative  Hematological and Lymphatic ROS: negative  Endocrine ROS: negative  Breast ROS: negative  Respiratory ROS: negative  Cardiovascular ROS: negative  Gastrointestinal ROS:negative  Genito-Urinary ROS: negative  Musculoskeletal ROS: negative   Skin ROS: negative    Physical Exam   Vitals Reviewed   Constitutional: Patient is oriented to person, place, and time. Patient appears well-developed and well-nourished. Patient is active and cooperative. Non-toxic appearance. No distress. HENT:   Head: Normocephalic and atraumatic. Head is without abrasion and without laceration. Hair is normal.   Right Ear: External ear normal. No lacerations. No drainage, swelling . Left Ear: External ear normal. No lacerations. No drainage, swelling. Nose/Mouth: face mask in place  Eyes: Conjunctivae, EOM and lids are normal. Right eye exhibits no discharge. No foreign body present in the right eye. Left eye exhibits no discharge. No foreign body present in the left eye. No scleral icterus. Neck: Trachea normal and normal range of motion. No JVD present. Pulmonary/Chest: Effort normal. No accessory muscle usage or stridor. No apnea. No respiratory distress. Cardiovascular: Normal rate and no JVD. Abdominal: Normal appearance. Patient exhibits no distension. Abdomen is soft, obese, non tender. Musculoskeletal: Normal range of motion. Patient exhibits no edema. Neurological: Patient is alert and oriented to person, place, and time. Patient has normal strength. GCS eye subscore is 4. GCS verbal subscore is 5. GCS motor subscore is 6. Skin: Skin is warm and dry. No abrasion and no rash noted. Patient is not diaphoretic. No cyanosis or erythema. Psychiatric: Patient has a normal mood and affect.  Speech is normal and behavior is normal. Cognition and memory are normal.       A/P    Skippy Pleas is 31 y.o. female, Body mass index is 45.16 kg/m². pre surgery, has gained 6 lbs since last visit. The patient underwent extensive dietary counseling with registered dietician. I have reviewed, discussed and agree with the dietary plan. Patient is trying hard to keep good dietary and behavior modifications. Patient is monitoring portion sizes, food choices and liquid calories. Patient is trying to exercise regularly as much as possible. Obesity as a disease is considered a high risk to patients overall health and should therefore be considered a high risk disease state. Advised the patient that not getting there weight under control, that could increase risk of complications/worsening of those conditions on the long-term. (Goal of weight loss surgery is to alleviate/control some of those co-morbidities)    Now with Covid-19 pandemic, CDC and health authorities does classify obese patients as vulnerable and high risk as well. Which makes weight loss a priority for improvement of their wellbeing and overall health. I encouraged the patient to continue exercise and keeping healthy eating habits. Discussed pre-op labs and work up till now. Also counseled the patient extensively on Surgery. The visit today, included any number of the following: Bariatric Preoperative work up/protocols, review of labs, imaging, provider notes, outside hospital records, performing examination/evaluation, counseling patient and/or family, ordering medications/tests, placing referrals and communication with referring physicians, coordination of care; discussing dietary plan/recall with the patient as well with registered dietitian and documentation in the EHR. Of note, the above was done during same day of the actual patient encounter. Vanesa Sanchez is here for her 3rd presurgical visit. Discussed with the patient her most recent nutrition labs. That is showing iron deficiency anemia.   Patient also informed me that she is following with her gynecologist due to heavy menses. Encouraged her to follow-up again and make sure she gets that under control. I will go ahead and refer the patient upstairs to hematology for possible iron infusion. Patient H&H is low and and needs to be higher than that before we can proceed with surgery. We'll see the patient next month for continued follow-up. We discussed how her excess weight affects her overall health and importance of weight loss, healthy diet and active lifestyle to alleviate those co morbid conditions, otherwise risk deterioration. Morbid Obesity: Body mass index is 45.16 kg/m². [x] Continue to make dietary and lifestyle modifications. [x] Plan for Future laparoscopic sleeve gastrectomy. [x] Return for follow-up next month. Chronic GERD:   [x] Continue to make dietary and lifestyle modifications. [x] Plan for EGD to evaluate the stomach. Prediabetes:   [x] Continue to make dietary and lifestyle modifications. [x] Reviewed the importance of checking blood sugars. [x] Continue to follow up with their PCP for medication management and monitoring. Obstructive Sleep Apnea:   [x] Continue to make dietary and lifestyle modifications. [x] Reviewed the importance of wearing / compliance with CPAP/BIPAP. [x] Continue to follow up with their sleep medicine provider for CPAP/BIPAP management and monitoring. Patient advised that its their responsibility to follow up for studies, referrals and/or labs ordered today.

## 2021-12-18 LAB
ALPHA-TOCOPHEROL: 8.4 MG/L (ref 5.5–18)
GAMMA-TOCOPHEROL: 1.7 MG/L (ref 0–6)
RETINYL PALMITATE: <0.02 MG/L (ref 0–0.1)
VITAMIN A LEVEL: 0.32 MG/L (ref 0.3–1.2)
VITAMIN A, INTERP: NORMAL

## 2021-12-21 LAB — VITAMIN B1 WHOLE BLOOD: 88 NMOL/L (ref 70–180)

## 2021-12-30 PROBLEM — Z01.818 PREOPERATIVE CLEARANCE: Status: RESOLVED | Noted: 2020-10-27 | Resolved: 2021-12-30

## 2022-01-03 ENCOUNTER — OFFICE VISIT (OUTPATIENT)
Dept: SLEEP MEDICINE | Age: 27
End: 2022-01-03
Payer: COMMERCIAL

## 2022-01-03 VITALS
RESPIRATION RATE: 18 BRPM | OXYGEN SATURATION: 99 % | HEIGHT: 69 IN | WEIGHT: 293 LBS | TEMPERATURE: 97.3 F | SYSTOLIC BLOOD PRESSURE: 110 MMHG | HEART RATE: 103 BPM | DIASTOLIC BLOOD PRESSURE: 70 MMHG | BODY MASS INDEX: 43.4 KG/M2

## 2022-01-03 DIAGNOSIS — E66.01 MORBID OBESITY WITH BMI OF 45.0-49.9, ADULT (HCC): ICD-10-CM

## 2022-01-03 DIAGNOSIS — Z99.89 OSA ON CPAP: Primary | ICD-10-CM

## 2022-01-03 DIAGNOSIS — G47.33 OSA ON CPAP: Primary | ICD-10-CM

## 2022-01-03 DIAGNOSIS — Z99.89 DEPENDENCE ON OTHER ENABLING MACHINES AND DEVICES: ICD-10-CM

## 2022-01-03 PROCEDURE — G8417 CALC BMI ABV UP PARAM F/U: HCPCS | Performed by: PSYCHIATRY & NEUROLOGY

## 2022-01-03 PROCEDURE — G8484 FLU IMMUNIZE NO ADMIN: HCPCS | Performed by: PSYCHIATRY & NEUROLOGY

## 2022-01-03 PROCEDURE — 1036F TOBACCO NON-USER: CPT | Performed by: PSYCHIATRY & NEUROLOGY

## 2022-01-03 PROCEDURE — G8427 DOCREV CUR MEDS BY ELIG CLIN: HCPCS | Performed by: PSYCHIATRY & NEUROLOGY

## 2022-01-03 PROCEDURE — 99214 OFFICE O/P EST MOD 30 MIN: CPT | Performed by: PSYCHIATRY & NEUROLOGY

## 2022-01-03 ASSESSMENT — SLEEP AND FATIGUE QUESTIONNAIRES
HOW LIKELY ARE YOU TO NOD OFF OR FALL ASLEEP WHILE WATCHING TV: 1
HOW LIKELY ARE YOU TO NOD OFF OR FALL ASLEEP WHEN YOU ARE A PASSENGER IN A CAR FOR AN HOUR WITHOUT A BREAK: 1
HOW LIKELY ARE YOU TO NOD OFF OR FALL ASLEEP IN A CAR, WHILE STOPPED FOR A FEW MINUTES IN TRAFFIC: 0
HOW LIKELY ARE YOU TO NOD OFF OR FALL ASLEEP WHILE SITTING AND TALKING TO SOMEONE: 0
HOW LIKELY ARE YOU TO NOD OFF OR FALL ASLEEP WHILE SITTING INACTIVE IN A PUBLIC PLACE: 0
HOW LIKELY ARE YOU TO NOD OFF OR FALL ASLEEP WHILE LYING DOWN TO REST IN THE AFTERNOON WHEN CIRCUMSTANCES PERMIT: 1
HOW LIKELY ARE YOU TO NOD OFF OR FALL ASLEEP WHILE SITTING AND READING: 0
HOW LIKELY ARE YOU TO NOD OFF OR FALL ASLEEP WHILE SITTING QUIETLY AFTER LUNCH WITHOUT ALCOHOL: 1
ESS TOTAL SCORE: 4

## 2022-01-03 NOTE — PATIENT INSTRUCTIONS
Patient Education        Learning About CPAP for Sleep Apnea  What is CPAP? CPAP is a small machine that you use at home every night while you sleep. It increases air pressure in your throat to keep your airway open. When you have sleep apnea, this can help you sleep better, feel better, and avoid future health problems. CPAP stands for \"continuous positive airway pressure. \"  The CPAP machine will have one of the following:  · A mask that covers your nose and mouth  · A mask that covers your nose only  · A nasal pillow that covers only the openings of your nose  Why is it done? CPAP is usually the best treatment for obstructive sleep apnea. It is the first treatment choice and the most widely used. CPAP:  · Helps you have more normal sleep, so you feel less sleepy and more alert during the daytime. · May help keep heart failure or other heart problems from getting worse. · May help lower your blood pressure. If you have a bed partner, they may also sleep better when you use a CPAP. That's because you aren't snoring or restless. Your doctor may suggest CPAP if you have:  · Moderate to severe sleep apnea. · Sleep apnea and coronary artery disease (CAD). · Sleep apnea and heart failure. What are the side effects? Some people who use CPAP have:  · A dry or stuffy nose and a sore throat. · Irritated skin on the face. · Bloating. How can you care for yourself? If using CPAP is not comfortable, or if you have certain side effects, work with your doctor to fix them. Here are some things you can try:  · Be sure the mask, nasal mask, or nasal pillow fits well. · See if your doctor can adjust the pressure of your CPAP. · If your nose or mouth is dry, set the machine to deliver warmer or wetter air. Or try using a humidifier. · If your nose is runny or stuffy, talk to your doctor about using a decongestant medicine or steroid nasal spray. Be safe with medicines.  Read and follow all instructions on the label. Do not use the medicine longer than the label says. · Your doctor may also help you with problems like swallowing air, bloating, or claustrophobia. Talk to your doctor if you're still having problems. If these things don't help, you might try a different type of machine. Where can you learn more? Go to [email or web address] and sign in to your [employer] account. Enter Z889 in the [employer] box to learn more about \"Learning About CPAP for Sleep Apnea. \"     If you do not have an account, please click on the \"Sign Up Now\" link. Current as of: July 6, 2021               Content Version: 13.1  © 2006-2021 [employer]wise[employer] Inc[employer]. Care instructions adapted under license by [facility] ([facility]). If you have questions about a medical condition or this instruction, always ask your healthcare professional. [place] [address] any warranty or liability for your use of this information.

## 2022-01-03 NOTE — PROGRESS NOTES
Herminio Phillips         : 1995        PHONE: [phone] (home)   [] [address]     [] [address]      [] [place]     [][employer]    [] [address] [address]  [x] [facility]     [] Other:    Diagnosis: [x] ALPHONSO (G47.33) [] CSA (G47.31) [] Apnea (G47.30)   Length of Need: [] 12 Months [x] 99 Months [] Other:    Machine (ALEXEI!): [] Respironics Dream Station      Auto [] ResMed AirSense     Auto [] Other:     []  CPAP () [] Bilevel ()   Mode: [] Auto [] Spontaneous    Mode: [] Auto [] Spontaneous                            Comfort Settings:   - Ramp Pressure: 5 cmH2O                                        - Ramp time: 15 min                                     -  Flex/EPR - 3 full time                                    - For ResMed Bilevel (TiMax-4 sec   TiMin- 0.2 sec)     Humidifier: [] Heated ()        [x] Water chamber replacement ()/ 1 per 6 months        Mask:   [x] Nasal () /1 per 3 months [] Full Face () /1 per 3 months   [x] Patient choice -Size and fit mask [] Patient Choice - Size and fit mask   [] Dispense:  [] Dispense:    [x] Headgear () / 1 per 3 months [] Headgear () / 1 per 3 months   [x] Replacement Nasal Cushion ()/2 per month [] Interface Replacement ()/1 per month   [x] Replacement Nasal Pillows ()/2 per month         Tubing: [x] Heated ()/1 per 3 months    [] Standard ()/1 per 3 months [] Other:           Filters: [x] Non-disposable ()/1 per 6 months     [x] Ultra-Fine, Disposable ()/2 per month        Miscellaneous: [] Chin Strap ()/ 1 per 6 months [] O2 bleed-in:       LPM   [] Oximetry on CPAP/Bilevel []  Other:          Start Order Date: 22    MEDICAL JUSTIFICATION:  I, the undersigned, certify that the above prescribed supplies are medically necessary for this patients wellbeing.   In my opinion, the supplies are both reasonable and necessary in reference to accepted standards of medicalpractice in treatment of this patients condition.     Perry Grijalva MD      NPI: [id number]       Order Signed Date: 01/03/22    Electronically signed by Perry Grijalva MD on 1/3/2022 at 2:01 PM

## 2022-01-03 NOTE — PROGRESS NOTES
MD KARLA Newman Board Certified in Sleep Medicine  Certified in [address] [place] [address] Certified in Neurology [address],  Nito Wetzel [phone]   [address] [place] [address], [address] [place] [address]                      [facility]  [address] [id number] [phone]    Subjective:     Patient ID: Kirby Newton is a 31 y.o. female. Chief Complaint   Patient presents with    Follow-up       HPI:        Kirby Newton is a 31 y.o. female was seen today as a follow for mild obstructive sleep apnea with apnea hypopnea index of 5.8/hr with lowest O2 saturation of 90%, patient spent about 0 minutes below 90%. Patient is using the PAP machine about 90% of the time, more than 4 hours a nightabout  77 %, in total average of 8:13 hours a night in last 30 days. Currently on PAP at 8 cm (), the AHI is only 0.6 events per hour at this pressure. Patient improved regarding daytime sleepiness and fatigue, wakes up refreshed in the morning. The Patient scored Total score: 4 on Rachel Sleepiness Scale ( more than 10 is indicative of daytime sleepiness)   Patient has no problem with PAP pressure or mask. Snores at the current pressure. Lost 6 pounds in year.   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     Occupation:    Tobacco Use    Smoking status: Never Smoker    Smokeless tobacco: Never Used   Vaping Use    Vaping Use: Never used   Substance and Sexual Activity    Alcohol use: Yes     Alcohol/week: 1.0 standard drink     Types: 1 Glasses of wine per week     Comment: belongs to a wine-club-  (once a month )     Drug use: No    Sexual activity: Yes     Partners: Male   Other Topics Concern    Not on file   Social History Narrative    Lives with mother and nephew     Social Determinants of Health     Financial Resource Strain: Low Risk     Difficulty of Paying Living Expenses: Not hard at all   Food Insecurity: No Food Insecurity    Worried About [facility] in the Last Year: Never true    [facility] in the Last Year: Never true   Transportation Needs:     Lack of Transportation (Medical): Not on file    Lack of Transportation (Non-Medical): Not on file   Physical Activity:     Days of Exercise per Week: Not on file    Minutes of Exercise per Session: Not on file   Stress:     Feeling of Stress : Not on file   Social Connections:     Frequency of Communication with Friends and Family: Not on file    Frequency of Social Gatherings with Friends and Family: Not on file    Attends Yazidism Services: Not on file    Active Member of [address] [place] [address] or Organizations: Not on file    Attends Club or Organization Meetings: Not on file    Marital Status: Not on file   Intimate Partner Violence:     Fear of Current or Ex-Partner: Not on file    Emotionally Abused: Not on file    Physically Abused: Not on file    Sexually Abused: Not on file   Housing Stability:     Unable to Pay for Housing in the Last Year: Not on file    Number of [place] in the Last Year: Not on file    Unstable Housing in the Last Year: Not on file       Prior to Admission medications    Medication Sig Start Date End Date Taking?  Authorizing Provider   vitamin D (CHOLECALCIFEROL) 49160 UNIT CAPS Take 1 capsule by mouth once a week 12/17/21 3/17/22 Yes Chang Alberto MD   Cholecalciferol 50 MCG (2000 UT) TABS Take 1 tablet by mouth daily Take 1 tablet by mouth daily. Start this medication after you finish the weekly regimen 12/17/21  Yes Javon Haynes MD   ferrous sulfate (IRON 325) 325 (65 Fe) MG tablet Take 1 tablet by mouth 2 times daily (with meals) 12/17/21  Yes Javon Haynes MD   Benzoyl Peroxide [facility]) 10 % external wash Apply topically 2 times daily.  9/29/21  Yes CASPER Alcazar CNP   cetirizine (ZYRTEC) 10 MG tablet TAKE 1 TABLET BY MOUTH EVERY DAY 9/29/21  Yes CASPER Alcazar CNP   montelukast (SINGULAIR) 10 MG tablet TAKE 1 TABLET BY MOUTH EVERY DAY AT NIGHT 9/29/21  Yes CASPER Alcazar CNP   venlafaxine (EFFEXOR XR) 37.5 MG extended release capsule TAKE 1 CAPSULE BY MOUTH EVERY DAY 8/30/21  Yes ACSPER Alcazar CNP   albuterol sulfate HFA (VENTOLIN HFA) 108 (90 Base) MCG/ACT inhaler Inhale 2 puffs into the lungs every 6 hours as needed for Wheezing 4/20/21  Yes CASPER Alcazar CNP   metFORMIN (GLUCOPHAGE-XR) 500 MG extended release tablet Take 1 tablet by mouth daily (with breakfast)  Patient taking differently: Take 500 mg by mouth 2 times daily  4/8/20  Yes Justice Yan MD   ferrous sulfate (FE TABS) 325 (65 Fe) MG EC tablet Take 1 tablet by mouth daily (with breakfast) (take with VitC for improved absorption) 7/29/19  Yes Shakila Cotto MD   fluticasone (FLONASE) 50 MCG/ACT nasal spray SHAKE LIQUID AND USE 1 SPRAY IN EACH NOSTRIL DAILY 5/24/19  Yes Shakila Cotto MD       Allergies as of 01/03/2022 - Fully Reviewed 01/03/2022   Allergen Reaction Noted    Seasonal  10/24/2018       Patient Active Problem List   Diagnosis    Asthma    Iron deficiency anemia    Lichen simplex    Menorrhagia with regular cycle    Generalized anxiety disorder    Seasonal allergic rhinitis due to pollen    Hidradenitis    Palpitations    Abscess of groin, right    Menorrhagia    Prediabetes    Obesity    Obstructive sleep apnea    PLMD (periodic limb movement disorder)    Morbid obesity with BMI of 45.0-49.9, adult ([address]    Postprandial abdominal bloating    Morbid obesity with BMI of 40.0-44.9, adult (HCC)    Chronic GERD       Past Medical History:   Diagnosis Date    Allergic rhinitis     Anxiety     Asthma     Chronic GERD 11/30/2021    Hidradenitis     History of prediabetes 2012    Iron deficiency anemia     Menorrhagia     Migraine     Obesity     Obstructive sleep apnea     Pre-operative clearance 10/27/2020    Prediabetes 2/17/2020       Past Surgical History:   Procedure Laterality Date    CHOLECYSTECTOMY      TONSILLECTOMY  1998       Family History   Problem Relation Age of Onset    Other Mother         uterine fibroids    Diabetes Mother     Thyroid Disease Mother     Elevated Lipids Mother     Diabetes Father     Hypertension Father     Stroke Father 64    Sleep Apnea Father     Diabetes Other     Hypertension Other     Other Other         kidney disease (paternal uncle)    Other Maternal Grandmother         diverticulitis    Diabetes Maternal Grandmother     Other Paternal Grandmother         bone cancer     Heart Attack Paternal Grandmother     Colon Cancer Paternal Grandfather        Review of Systems    Objective:     Vitals:  Weight BMI Neck circumference    Wt Readings from Last 3 Encounters:   01/03/22 296 lb 12.8 oz (134.6 kg)   12/17/21 297 lb (134.7 kg)   11/30/21 291 lb (132 kg)    Body mass index is 43.83 kg/m². BP HR SaO2   BP Readings from Last 3 Encounters:   01/03/22 110/70   12/17/21 129/79   11/30/21 124/73    Pulse Readings from Last 3 Encounters:   01/03/22 103   12/17/21 93   11/30/21 101    SpO2 Readings from Last 3 Encounters:   01/03/22 99%   12/17/21 98%   11/30/21 98%        Themandibular molar Class :   [x]1 []2 []3      Mallampati I Airway Classification:   [x]1 []2 []3 []4      Physical Exam  Vitals and nursing note reviewed. Constitutional:       Appearance: Normal appearance.    HENT: Head: Atraumatic. Nose: Nose normal.      Mouth/Throat:      Mouth: Mucous membranes are moist.   Eyes:      Extraocular Movements: Extraocular movements intact. Cardiovascular:      Rate and Rhythm: Normal rate and regular rhythm. Pulmonary:      Effort: Pulmonary effort is normal.      Breath sounds: Normal breath sounds. Musculoskeletal:         General: Normal range of motion. Cervical back: Normal range of motion and neck supple. Skin:     General: Skin is warm. Neurological:      General: No focal deficit present. Psychiatric:         Mood and Affect: Mood normal.         :   Mild Obstructive Sleep Apnea/Hypopnea Syndrome under good control on PAP at 8 cmwp. Diagnosis Orders   1. ALPHONSO on CPAP     2. Dependence on other enabling machines and devices     3. Morbid obesity with BMI of 45.0-49.9, adult ([facility] [address]       Plan:       I adjusted the PAP pressure to the PAP pressure of 8-12 cmwp. I educated the Patient about the PAP machine including how to change the heated humidifier. I will order PAP supplies, mask, filters. Most likely the patient will benefit from the gastric sleeve surgery in treating of the obstructive sleep apnea. In 2013, in a study published in Obesity Surgery Journal  A total of 69 studies with 13,900 patients were included and revealed that all the procedures achieved profound effects on ALPHONSO, as over 75 % of patients saw at least an improvement in their sleep apnea, bariatric surgery is a definitive treatment for obstructive sleep apnea, regardless of the specific type of surgery. We discussed the proportionality between weight and AHI. With 10% weight change, the AHI has a 27% proportionate change. With 20% weight change, the AHI has a 45-50% proportionate change. The Patient is considered low risk for post operative pulmonary complications following sleeve gastrectomy from obstructive sleep apnea standpoint.          No orders of the defined types were placed in this encounter. Return in about 1 year (around 1/3/2023) for Reveiwing CPAP usage and compliance report and tro.     Arthur Urena MD  Medical Director [address] [place] [address]

## 2022-01-07 ENCOUNTER — TELEPHONE (OUTPATIENT)
Dept: PRIMARY CARE CLINIC | Age: 27
End: 2022-01-07

## 2022-01-07 DIAGNOSIS — U07.1 COVID-19 VIRUS INFECTION: Primary | ICD-10-CM

## 2022-01-07 RX ORDER — AZITHROMYCIN 250 MG/1
250 TABLET, FILM COATED ORAL SEE ADMIN INSTRUCTIONS
Qty: 6 TABLET | Refills: 0 | Status: SHIPPED | OUTPATIENT
Start: 2022-01-07 | End: 2022-01-12

## 2022-01-10 ENCOUNTER — TELEPHONE (OUTPATIENT)
Dept: PRIMARY CARE CLINIC | Age: 27
End: 2022-01-10

## 2022-01-10 DIAGNOSIS — F41.9 ANXIETY: Primary | ICD-10-CM

## 2022-01-10 PROBLEM — Z01.810 PREOP CARDIOVASCULAR EXAM: Status: ACTIVE | Noted: 2022-01-10

## 2022-01-10 NOTE — PROGRESS NOTES
[address]  1/12/22  Referring: Dr. Anuradha Yen CONSULT/CHIEF COMPLAINT/HPI     Reason for visit/ Chief complaint     Chief Complaint   Patient presents with   Darshana Kahn, gastric sleeve - not scheduled    Established New Doctor    Palpitations     high heart rates       HPI Van Cline is a 31 y.o. here on a perioperative basis for laparoscopic sleeve gastrectomy with Dr. Yolis Kahn. She has a history of prediabetes, GERD, and anxiety. She is a never smoker and  Follows with sleep medicine for mild ALPHONSO on CPAP. Patient denies chest pain, shortness of breath, syncope, or dizziness. She previously had started the workup for weight loss surgery in the past, however her father unfortunately passed away unexpectedly following a knee replacement of a PE and she held off on the workup. She would like to pursue again. Reports that she went to the ER several years ago and her heart rate was in the 170's. She states her heart rate remained elevated for 2 days, but the ER told her that it was anxiety. No repeat episodes. Admits she had \"panic attacks\" afterwards, but they eventually eased. Today, she does report she has been experiencing occasional palpitations, mostly at nighttime that wake her from sleep. However also on rare occasion has an episode of palpitations during the day. No discernable pattern. Patient is adherent with medications and is tolerating them well without side effects     HISTORY/ALLERGIES/ROS     MedHx:  has a past medical history of Allergic rhinitis, Anxiety, Asthma, Chronic GERD, Hidradenitis, History of prediabetes, Iron deficiency anemia, Menorrhagia, Migraine, Obesity, Obstructive sleep apnea, Pre-operative clearance, and Prediabetes. SurgHx:  has a past surgical history that includes Tonsillectomy (1998) and Cholecystectomy. SocHx:  reports that she has never smoked.  She has never used smokeless tobacco. She reports current alcohol use of about 1.0 standard drink of alcohol per week. She reports that she does not use drugs. FamHx: No family history of premature coronary artery disease, sudden death, or aneurysm  Allergies: Seasonal   ROS:   Review of Systems       MEDICATIONS      Prior to Admission medications    Medication Sig Start Date End Date Taking? Authorizing Provider   azithromycin (ZITHROMAX) 250 MG tablet Take 1 tablet by mouth See Admin Instructions for 5 days 500mg on day 1 followed by 250mg on days 2 - 5 1/7/22 1/12/22 Yes CASPER Zhu CNP   venlafaxine (EFFEXOR XR) 37.5 MG extended release capsule TAKE 1 CAPSULE BY MOUTH EVERY DAY 1/3/22  Yes CASPER Zhu CNP   vitamin D (CHOLECALCIFEROL) 06408 UNIT CAPS Take 1 capsule by mouth once a week 12/17/21 3/17/22 Yes Delroy Padilla MD   Cholecalciferol 50 MCG (2000 UT) TABS Take 1 tablet by mouth daily Take 1 tablet by mouth daily. Start this medication after you finish the weekly regimen 12/17/21  Yes Delroy Padilla MD   Benzoyl Peroxide [facility]) 10 % external wash Apply topically 2 times daily.  9/29/21  Yes CASPER Zhu CNP   cetirizine (ZYRTEC) 10 MG tablet TAKE 1 TABLET BY MOUTH EVERY DAY 9/29/21  Yes CASPER Zhu CNP   montelukast (SINGULAIR) 10 MG tablet TAKE 1 TABLET BY MOUTH EVERY DAY AT NIGHT 9/29/21  Yes CASPER Zhu CNP   albuterol sulfate HFA (VENTOLIN HFA) 108 (90 Base) MCG/ACT inhaler Inhale 2 puffs into the lungs every 6 hours as needed for Wheezing 4/20/21  Yes CASPER Zhu CNP   metFORMIN (GLUCOPHAGE-XR) 500 MG extended release tablet Take 1 tablet by mouth daily (with breakfast)  Patient taking differently: Take 500 mg by mouth 2 times daily  4/8/20  Yes Sanna Ricketts MD   ferrous sulfate (FE TABS) 325 (65 Fe) MG EC tablet Take 1 tablet by mouth daily (with breakfast) (take with VitC for improved absorption) 7/29/19  Yes Saskia Hi MD   ferrous sulfate (IRON 325) 325 (65 Fe) MG tablet Take 1 tablet by mouth 2 times daily (with meals)  Patient not taking: Reported on 1/12/2022 12/17/21   Jef Connor MD   fluticasone [facility]) 50 MCG/ACT nasal spray SHAKE LIQUID AND USE 1 SPRAY IN EACH NOSTRIL DAILY 5/24/19   Elaina Olszewski, MD       PHYSICAL EXAM        Vitals:    01/12/22 1301   BP: (!) 122/90   Pulse:    SpO2:     Weight: 294 lb 1.6 oz (133.4 kg)     Gen Alert, cooperative, no distress Heart  Regular rate and rhythm, no murmur   Head Normocephalic, atraumatic, no abnormalities Abd  Soft, NT, +BS, no mass, no OM   Eyes PERRLA, conj/corn clear Ext  Ext nl, AT, no C/C, no edema   Nose Nares normal, no drain age, Non-tender Pulse 2+ and symmetric   Throat Lips, mucosa, tongue normal Skin Color/text/turg nl, no rash/lesions   Neck S/S, TM, NT, no bruit Psych Nl mood and affect   Lung CTA-B, unlabored, no DTP     Ch wall NT, no deform       LABS and Imaging     Relevant and available CV data reviewed    EKG personally interpreted: 1/12/22  Sinus  Rhythm   -Horizontal axis for age. BORDERLINE    Lipids 12/2021:  TG 92 HDL 45 LDL 99      ASSESSMENT AND PLAN     1. Preoperative clearance for laparoscopic sleeve gastrectomy   - EKG today stable and without acute finding. Occasional palpitations, mostly at nighttime  - Denies family history of CAD  Plan: OK to pursue surgery at low cardiac risk    2. Prediabetes  - A1C 6.3 (12/2021)  - Untreated, has been on Metformin in the past  Plan: Continue dietary modifications, exercise, and weight loss    3. Obesity  - BMI 43.83  - Plan for surgical weight loss    4. ALPHONSO  - On CPAP, compliant with machine discussed  - Follows with sleep medicine    5. History of tachycardia  - All available EKGs show sinus tachycardia  - No further workup needed unless new symptoms      Patient counseled on lifestyle modification, diet, and exercise. Follow Up: Return if symptoms worsen or fail to improve. 1. Will send clearance letter to Dr. Jessica Hernandez  2. Follow up PRN with cardiology       Shireen Zambrano MD  Cardiologist [address]    Scribe's Attestation: This note was scribed in the presence of Dr. Izabella Eagle MD by Jeffrey Dumont RN. Physician Attestation  The scribe wrote this note in the presence of me Shireen Zambrano MD). The scribe may have prepopulated components of this note with my historical  intellectual property under my direct supervision. Any additions to this intellectual property were performed in my presence and at my direction. Furthermore, the content and accuracy of this note have been reviewed by me with edits by me as needed.   Shireen Zambrano MD 1/12/2022 2:00 PM

## 2022-01-10 NOTE — TELEPHONE ENCOUNTER
----- Message from Devyn Rubio sent at 1/10/2022 10:43 AM EST -----  Subject: Message to Provider    QUESTIONS  Information for Provider? PT was calling to cancel Dr Jason samuel. I was   advised  has left practice, I am unable to take apt off PT calendar. Also PT was wanting to see if Dr Cassi Lara could give a referral for new   Psychiatry or if Dr Cassi Lara was able to take care of PT meds. Please   contact PT back with any info. Thank you.   ---------------------------------------------------------------------------  --------------  CALL BACK INFO  What is the best way for the office to contact you? OK to leave message on   voicemail  Preferred Call Back Phone Number? [phone]  ---------------------------------------------------------------------------  --------------  SCRIPT ANSWERS  Relationship to Patient?  Self

## 2022-01-12 ENCOUNTER — OFFICE VISIT (OUTPATIENT)
Dept: CARDIOLOGY CLINIC | Age: 27
End: 2022-01-12
Payer: COMMERCIAL

## 2022-01-12 VITALS
OXYGEN SATURATION: 100 % | DIASTOLIC BLOOD PRESSURE: 90 MMHG | HEIGHT: 69 IN | HEART RATE: 82 BPM | WEIGHT: 293 LBS | SYSTOLIC BLOOD PRESSURE: 122 MMHG | BODY MASS INDEX: 43.4 KG/M2

## 2022-01-12 DIAGNOSIS — Z01.810 PREOP CARDIOVASCULAR EXAM: ICD-10-CM

## 2022-01-12 DIAGNOSIS — G47.33 OBSTRUCTIVE SLEEP APNEA: ICD-10-CM

## 2022-01-12 DIAGNOSIS — E66.01 MORBID OBESITY WITH BMI OF 45.0-49.9, ADULT (HCC): Primary | ICD-10-CM

## 2022-01-12 DIAGNOSIS — R73.03 PREDIABETES: ICD-10-CM

## 2022-01-12 PROCEDURE — G8484 FLU IMMUNIZE NO ADMIN: HCPCS | Performed by: INTERNAL MEDICINE

## 2022-01-12 PROCEDURE — G8427 DOCREV CUR MEDS BY ELIG CLIN: HCPCS | Performed by: INTERNAL MEDICINE

## 2022-01-12 PROCEDURE — 99203 OFFICE O/P NEW LOW 30 MIN: CPT | Performed by: INTERNAL MEDICINE

## 2022-01-12 PROCEDURE — G8417 CALC BMI ABV UP PARAM F/U: HCPCS | Performed by: INTERNAL MEDICINE

## 2022-01-12 PROCEDURE — 93000 ELECTROCARDIOGRAM COMPLETE: CPT | Performed by: INTERNAL MEDICINE

## 2022-01-12 PROCEDURE — 1036F TOBACCO NON-USER: CPT | Performed by: INTERNAL MEDICINE

## 2022-01-12 NOTE — TELEPHONE ENCOUNTER
Faxed new referral along with office notes ([phone]).   Called patient and gave her referring provider name and number [facility] [phone]

## 2022-01-12 NOTE — LETTER
[address] Cardiology [address] [address]  Phone: [phone]  Fax: [phone]    Mark Dutta MD    January 12, 2022     Tiara Sosa, APRN - CNP  [address] [address]    Patient: Jenna Lowe   MR Number: [id number]   YOB: 1995   Date of Visit: 1/12/2022       Dear Tiara Sosa:    [address] [address]  1/12/22  Referring: Dr. Reji Chaevz CONSULT/CHIEF COMPLAINT/HPI     Reason for visit/ Chief complaint     Chief Complaint   Patient presents with   Bennet Angelucci Dr Kermit Height, gastric sleeve - not scheduled    Established New Doctor    Palpitations     high heart rates       HPI Jenna Lowe is a 31 y.o. here on a perioperative basis for laparoscopic sleeve gastrectomy with Dr. Massimo Styles. She has a history of prediabetes, GERD, and anxiety. She is a never smoker and  Follows with sleep medicine for mild ALPHONSO on CPAP. Patient denies chest pain, shortness of breath, syncope, or dizziness. She previously had started the workup for weight loss surgery in the past, however her father unfortunately passed away unexpectedly following a knee replacement of a PE and she held off on the workup. She would like to pursue again. Reports that she went to the ER several years ago and her heart rate was in the 170's. She states her heart rate remained elevated for 2 days, but the ER told her that it was anxiety. No repeat episodes. Admits she had \"panic attacks\" afterwards, but they eventually eased. Today, she does report she has been experiencing occasional palpitations, mostly at nighttime that wake her from sleep. However also on rare occasion has an episode of palpitations during the day. No discernable pattern.     Patient is adherent with medications and is tolerating them well without side effects     HISTORY/ALLERGIES/ROS     MedHx:  has a past medical history of Allergic rhinitis, Anxiety, Asthma, Chronic GERD, Hidradenitis, History of prediabetes, Iron deficiency anemia, Menorrhagia, Migraine, Obesity, Obstructive sleep apnea, Pre-operative clearance, and Prediabetes. SurgHx:  has a past surgical history that includes Tonsillectomy (1998) and Cholecystectomy. SocHx:  reports that she has never smoked. She has never used smokeless tobacco. She reports current alcohol use of about 1.0 standard drink of alcohol per week. She reports that she does not use drugs. FamHx: No family history of premature coronary artery disease, sudden death, or aneurysm  Allergies: Seasonal   ROS:   Review of Systems       MEDICATIONS      Prior to Admission medications    Medication Sig Start Date End Date Taking? Authorizing Provider   azithromycin (ZITHROMAX) 250 MG tablet Take 1 tablet by mouth See Admin Instructions for 5 days 500mg on day 1 followed by 250mg on days 2 - 5 1/7/22 1/12/22 Yes CASPER Esparza CNP   venlafaxine (EFFEXOR XR) 37.5 MG extended release capsule TAKE 1 CAPSULE BY MOUTH EVERY DAY 1/3/22  Yes CASPER Esparza CNP   vitamin D (CHOLECALCIFEROL) 33751 UNIT CAPS Take 1 capsule by mouth once a week 12/17/21 3/17/22 Yes Keith Sanchez MD   Cholecalciferol 50 MCG (2000 UT) TABS Take 1 tablet by mouth daily Take 1 tablet by mouth daily. Start this medication after you finish the weekly regimen 12/17/21  Yes Keith Sanchez MD   Benzoyl Peroxide [facility]) 10 % external wash Apply topically 2 times daily.  9/29/21  Yes CASPER Esparza CNP   cetirizine (ZYRTEC) 10 MG tablet TAKE 1 TABLET BY MOUTH EVERY DAY 9/29/21  Yes CASPER Esparza CNP   montelukast (SINGULAIR) 10 MG tablet TAKE 1 TABLET BY MOUTH EVERY DAY AT NIGHT 9/29/21  Yes CASPER Esparza CNP   albuterol sulfate HFA (VENTOLIN HFA) 108 (90 Base) MCG/ACT inhaler Inhale 2 puffs into the lungs every 6 hours as needed for Wheezing 4/20/21  Yes CASPER Esparza CNP   metFORMIN (GLUCOPHAGE-XR) 500 MG extended release tablet Take 1 tablet by mouth daily (with breakfast)  Patient taking differently: Take 500 mg by mouth 2 times daily  4/8/20  Yes James Quach MD   ferrous sulfate (FE TABS) 325 (65 Fe) MG EC tablet Take 1 tablet by mouth daily (with breakfast) (take with VitC for improved absorption) 7/29/19  Yes Juan Carlos Poole MD   ferrous sulfate (IRON 325) 325 (65 Fe) MG tablet Take 1 tablet by mouth 2 times daily (with meals)  Patient not taking: Reported on 1/12/2022 12/17/21   Humera Guzman MD   fluticasone (FLONASE) 50 MCG/ACT nasal spray SHAKE LIQUID AND USE 1 SPRAY IN EACH NOSTRIL DAILY 5/24/19   Juan Carlos Poole MD       PHYSICAL EXAM        Vitals:    01/12/22 1301   BP: (!) 122/90   Pulse:    SpO2:     Weight: 294 lb 1.6 oz (133.4 kg)     Gen Alert, cooperative, no distress Heart  Regular rate and rhythm, no murmur   Head Normocephalic, atraumatic, no abnormalities Abd  Soft, NT, +BS, no mass, no OM   Eyes PERRLA, conj/corn clear Ext  Ext nl, AT, no C/C, no edema   Nose Nares normal, no drain age, Non-tender Pulse 2+ and symmetric   Throat Lips, mucosa, tongue normal Skin Color/text/turg nl, no rash/lesions   Neck S/S, TM, NT, no bruit Psych Nl mood and affect   Lung CTA-B, unlabored, no DTP     Ch wall NT, no deform       LABS and Imaging     Relevant and available CV data reviewed    EKG personally interpreted: 1/12/22  Sinus  Rhythm   -Horizontal axis for age. BORDERLINE    Lipids 12/2021:  TG 92 HDL 45 LDL 99      ASSESSMENT AND PLAN     1. Preoperative clearance for laparoscopic sleeve gastrectomy   - EKG today stable and without acute finding. Occasional palpitations, mostly at nighttime  - Denies family history of CAD  Plan: OK to pursue surgery at low cardiac risk    2. Prediabetes  - A1C 6.3 (12/2021)  - Untreated, has been on Metformin in the past  Plan: Continue dietary modifications, exercise, and weight loss    3. Obesity  - BMI 43.83  - Plan for surgical weight loss    4.  ALPHONSO  - On

## 2022-01-12 NOTE — LETTER
[facility] CARDIOLOGY[address]  Dept: [phone]  Dept Fax: [phone]      2022    Patient:Leonela Gordon  : 1995  DOS: 2022    To Whom it May Concern:    Kelsie Griffin has been evaluated for cardiac clearance. Kelsie Griffin is considered at a low risk for surgery from a cardiac perspective. Please let my office know if you have any questions or concerns.           Tien Ferrara MD         [facility] [employer] serving [facility]

## 2022-01-13 ENCOUNTER — TELEPHONE (OUTPATIENT)
Dept: PRIMARY CARE CLINIC | Age: 27
End: 2022-01-13

## 2022-01-13 NOTE — TELEPHONE ENCOUNTER
Patient states she is having a Hidradenitis flare up between her groin area and thighs, says it's red, swollen, and hot to the touch, she is also on A Z-pack for a upper respitory infection as well, please advise patient uses [facility] on [address]

## 2022-01-14 ENCOUNTER — HOSPITAL ENCOUNTER (EMERGENCY)
Age: 27
Discharge: HOME | End: 2022-01-14
Attending: EMERGENCY MEDICINE
Payer: COMMERCIAL

## 2022-01-14 VITALS
TEMPERATURE: 97.6 F | OXYGEN SATURATION: 100 % | RESPIRATION RATE: 17 BRPM | BODY MASS INDEX: 43.4 KG/M2 | HEART RATE: 86 BPM | HEIGHT: 69 IN | WEIGHT: 293 LBS | DIASTOLIC BLOOD PRESSURE: 88 MMHG | SYSTOLIC BLOOD PRESSURE: 149 MMHG

## 2022-01-14 DIAGNOSIS — L02.419 CELLULITIS AND ABSCESS OF LEG: Primary | ICD-10-CM

## 2022-01-14 DIAGNOSIS — L03.119 CELLULITIS AND ABSCESS OF LEG: Primary | ICD-10-CM

## 2022-01-14 DIAGNOSIS — L73.2 HYDRADENITIS: ICD-10-CM

## 2022-01-14 PROCEDURE — 99283 EMERGENCY DEPT VISIT LOW MDM: CPT

## 2022-01-14 RX ORDER — ONDANSETRON 4 MG/1
4 TABLET, FILM COATED ORAL DAILY PRN
Qty: 30 TABLET | Refills: 0 | Status: SHIPPED | OUTPATIENT
Start: 2022-01-14 | End: 2022-02-07

## 2022-01-14 RX ORDER — ONDANSETRON 4 MG/1
4 TABLET, ORALLY DISINTEGRATING ORAL ONCE
Status: DISCONTINUED | OUTPATIENT
Start: 2022-01-14 | End: 2022-01-14 | Stop reason: HOSPADM

## 2022-01-14 RX ORDER — HYDROCODONE BITARTRATE AND ACETAMINOPHEN 5; 325 MG/1; MG/1
1 TABLET ORAL EVERY 4 HOURS PRN
Qty: 18 TABLET | Refills: 0 | Status: SHIPPED | OUTPATIENT
Start: 2022-01-14 | End: 2022-01-17

## 2022-01-14 RX ORDER — IBUPROFEN 400 MG/1
400 TABLET ORAL ONCE
Status: DISCONTINUED | OUTPATIENT
Start: 2022-01-14 | End: 2022-01-14 | Stop reason: HOSPADM

## 2022-01-14 RX ORDER — MINOCYCLINE HYDROCHLORIDE 100 MG/1
100 CAPSULE ORAL 2 TIMES DAILY
Qty: 20 CAPSULE | Refills: 0 | Status: SHIPPED | OUTPATIENT
Start: 2022-01-14 | End: 2022-02-07

## 2022-01-14 ASSESSMENT — PAIN SCALES - GENERAL: PAINLEVEL_OUTOF10: 6

## 2022-01-14 NOTE — ED PROVIDER NOTES
eMERGENCY dEPARTMENT eNCOUnter        [address]    Chief Complaint   Patient presents with    Abscess       HPI    Jeremias Weems is a 31 y.o. female who presents ER for evaluation of acute on chronic Hydro nidus suppurativa increasing swelling to the left groin multiple surgical drainages. Afebrile. Just recently came off of doxycycline after a month. Positive groin pain. No nausea vomiting or diarrhea. No drainable abscess. REVIEW OF SYSTEMS    See HPI for further details. Review of systems otherwise negative. PAST MEDICAL HISTORY    Past Medical History:   Diagnosis Date    Allergic rhinitis     Anxiety     Asthma     Chronic GERD 11/30/2021    Hidradenitis     History of prediabetes 2012    Iron deficiency anemia     Menorrhagia     Migraine     Obesity     Obstructive sleep apnea     Pre-operative clearance 10/27/2020    Prediabetes 2/17/2020       SURGICAL HISTORY    Past Surgical History:   Procedure Laterality Date    CHOLECYSTECTOMY      TONSILLECTOMY  1998       CURRENT MEDICATIONS    Current Outpatient Rx   Medication Sig Dispense Refill    ondansetron (ZOFRAN) 4 MG tablet Take 1 tablet by mouth daily as needed for Nausea or Vomiting 30 tablet 0    minocycline (MINOCIN;DYNACIN) 100 MG capsule Take 1 capsule by mouth 2 times daily 20 capsule 0    venlafaxine (EFFEXOR XR) 37.5 MG extended release capsule TAKE 1 CAPSULE BY MOUTH EVERY DAY 60 capsule 2    vitamin D (CHOLECALCIFEROL) 09495 UNIT CAPS Take 1 capsule by mouth once a week 12 capsule 0    Cholecalciferol 50 MCG (2000 UT) TABS Take 1 tablet by mouth daily Take 1 tablet by mouth daily. Start this medication after you finish the weekly regimen 90 tablet 2    ferrous sulfate (IRON 325) 325 (65 Fe) MG tablet Take 1 tablet by mouth 2 times daily (with meals) (Patient not taking: Reported on 1/12/2022) 60 tablet 3    Benzoyl Peroxide (BENZAC AC) 10 % external wash Apply topically 2 times daily.  187 g 3    Activity    Alcohol use: Yes     Alcohol/week: 1.0 standard drink     Types: 1 Glasses of wine per week     Comment: belongs to a wine-club-  (once a month )     Drug use: No    Sexual activity: Yes     Partners: Male   Other Topics Concern    None   Social History Narrative    Lives with mother and nephew     Social Determinants of Health     Financial Resource Strain: Low Risk     Difficulty of Paying Living Expenses: Not hard at all   Food Insecurity: No Food Insecurity    Worried About Running Out of Food in the Last Year: Never true    [address] in the Last Year: Never true   Transportation Needs:     Lack of Transportation (Medical): Not on file    Lack of Transportation (Non-Medical):  Not on file   Physical Activity:     Days of Exercise per Week: Not on file    Minutes of Exercise per Session: Not on file   Stress:     Feeling of Stress : Not on file   Social Connections:     Frequency of Communication with Friends and Family: Not on file    Frequency of Social Gatherings with Friends and Family: Not on file    Attends [employer] Services: Not on file    Active Member of [address] [place] [address] or Organizations: Not on file    Attends Club or Organization Meetings: Not on file    Marital Status: Not on file   Intimate Partner Violence:     Fear of Current or Ex-Partner: Not on file    Emotionally Abused: Not on file    Physically Abused: Not on file    Sexually Abused: Not on file   Housing Stability:     Unable to Pay for Housing in the Last Year: Not on file    Number of [place] in the Last Year: Not on file    Unstable Housing in the Last Year: Not on file       PHYSICAL EXAM    VITAL SIGNS: BP (!) 149/88   Pulse 86   Temp 97.6 °F (36.4 °C) (Oral)   Resp 17   Ht 5' 9\" (1.753 m)   Wt 295 lb (133.8 kg)   SpO2 100%   BMI 43.56 kg/m²   Constitutional:  Well developed, well nourished, no acute distress, non-toxic appearance   HENT:  Atraumatic, external ears normal, nose normal, oropharynx moist.  Neck- normal range of motion, no tenderness, supple   Respiratory:  No respiratory distress, normal breath sounds. Cardiovascular:  Normal rate, normal rhythm, no murmurs, no gallops, no rubs   GI:  Soft, nondistended, normal bowel sounds, nontender   Musculoskeletal: Positive lymphadenopathy left groin with no discrete drainable abscess  Integument: Marked lymphadenopathy of the left groin postsurgical scars are noted  Neurologic: Sensory or motor    RADIOLOGY/PROCEDURES    none    ED COURSE & MEDICAL DECISION MAKING    Pertinent Labs & Imaging studies reviewed. (See chart for details)  Patient follow-up with general surgery, and dermatology oral minocycline, Zofran limited analgesia. Counseled with regards to need to abstain from pregnancy being on minocycline and doxycycline    FINAL IMPRESSION    1. Hidradenitis suppurativa,  2.          Lefty Wong MD  89/06/36 2590

## 2022-01-14 NOTE — ED TRIAGE NOTES
Pt states she has a skin condition which causes her to develop boils frequently. Arrives stating she has one on her inner left thigh/groin area which is worse than normal and preventing her from closing her legs or moving in certain direction.

## 2022-01-14 NOTE — ED NOTES
2nd attempt - Letter sent via US mail.   Pt did not need medication states she will get meds when she gets home     Kathryn Saucedo [place]  01/14/22 2536

## 2022-01-26 ENCOUNTER — TELEMEDICINE (OUTPATIENT)
Dept: BARIATRICS/WEIGHT MGMT | Age: 27
End: 2022-01-26
Payer: COMMERCIAL

## 2022-01-26 DIAGNOSIS — Z71.89 INDIVIDUAL COUNSELING ENCOUNTER: Primary | ICD-10-CM

## 2022-01-26 PROCEDURE — 96156 HLTH BHV ASSMT/REASSESSMENT: CPT | Performed by: SOCIAL WORKER

## 2022-01-26 NOTE — PROGRESS NOTES
Scar Hernandez is a 31 y.o. female evaluated via video virtual visit on 1/26/2022. Scar Hernandez presents today with diagnosis of individual counseling encounter related to behavioral health concerns. Patient is presenting in their home for initial assessment. Provider is evaluating virtually in home. Patient presented as open and honest throughout virtual appointment. Presenting Problem:   Patient reports she is returning to program after a bit of a break. States that she is still nervous about surgery, but she is determined to stick with program for her health. Losing weight slowly. Home life / Family relationships / Supports:   Patient lives with her fiance. States her fiance and cousins support her. States her mother is supportive of her losing weight/being healthy, but worries about surgery. 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 Psychiatric hx:  \"definitely anxiety, I've always been an anxious person since I was little. I do take some anxiety medication, but I am scheduled to start being weaned off of it soon\"     Hx of emotional/stress/bored eating:  Stress eating, eating out of boredom \"I've been like that since I was little\"     Daily Health Concerns/Limitations:  See health bio     Substance Use:  n/a    Current needs / Limitations   Not getting enough support when she lived with her parents and getting past that mindset, struggling with self discipline and motivation\"    Additional Questions/Concerns per patient  n/a    Behaviorist overview:   Utilized strengths based approach. Will provide patient with related handouts and resources. Encouraged patient to follow up in spring as needed.      Goals    None          Consent:  She and/or health care decision maker is aware that that she may receive a bill for this video service, depending on her insurance coverage, and has provided verbal consent to proceed: Yes      I affirm this is a Patient Initiated Episode with an Established Patient who has not had a related appointment within my department in the past 7 days or scheduled within the next 24 hours.     Total Time: minutes: 21-30 minutes    Note: not billable if this call serves to triage the patient into an appointment for the relevant concern      Lucy Lamas, [address] ,LISW

## 2022-01-27 ENCOUNTER — OFFICE VISIT (OUTPATIENT)
Dept: BARIATRICS/WEIGHT MGMT | Age: 27
End: 2022-01-27
Payer: COMMERCIAL

## 2022-01-27 VITALS
BODY MASS INDEX: 44.41 KG/M2 | OXYGEN SATURATION: 98 % | RESPIRATION RATE: 18 BRPM | WEIGHT: 293 LBS | DIASTOLIC BLOOD PRESSURE: 77 MMHG | SYSTOLIC BLOOD PRESSURE: 124 MMHG | HEART RATE: 99 BPM | HEIGHT: 68 IN

## 2022-01-27 DIAGNOSIS — E66.01 MORBID OBESITY WITH BMI OF 40.0-44.9, ADULT (HCC): Primary | ICD-10-CM

## 2022-01-27 DIAGNOSIS — G47.33 OBSTRUCTIVE SLEEP APNEA: ICD-10-CM

## 2022-01-27 DIAGNOSIS — E66.01 MORBID OBESITY WITH BMI OF 45.0-49.9, ADULT (HCC): ICD-10-CM

## 2022-01-27 DIAGNOSIS — K21.9 CHRONIC GERD: ICD-10-CM

## 2022-01-27 DIAGNOSIS — R73.03 PREDIABETES: ICD-10-CM

## 2022-01-27 PROCEDURE — 99214 OFFICE O/P EST MOD 30 MIN: CPT | Performed by: SURGERY

## 2022-01-27 PROCEDURE — 1036F TOBACCO NON-USER: CPT | Performed by: SURGERY

## 2022-01-27 PROCEDURE — G8427 DOCREV CUR MEDS BY ELIG CLIN: HCPCS | Performed by: SURGERY

## 2022-01-27 PROCEDURE — G8484 FLU IMMUNIZE NO ADMIN: HCPCS | Performed by: SURGERY

## 2022-01-27 PROCEDURE — G8417 CALC BMI ABV UP PARAM F/U: HCPCS | Performed by: SURGERY

## 2022-01-27 NOTE — PROGRESS NOTES
Darek Settler lost 1 lbs over past 5 weeks. Pt met with behaviorist Isaac yesterday. Pt has been fasting and only eating from 12p-8pm. Reports eliminated a lot of fast food and liquid calories since her last visit. Is pt eating at least 4 times everyday? Eats 2 x day as she is not hungry/busy throughout the day. Is pt eating a lean protein source with all meals and snacks? Yes    Lunch - Salad with lean ground beef/turkey/chicken with vinaigrette   Dinner - chili     Has pt decreased their portions using the plate method? Using smaller bowls    Is pt choosing low fat/sugar free options? Yes    Is pt drinking at least 64 oz of clear liquids everyday? Around this     Has pt stopped drinking carbonation, caffeinated, and sugar sweetened beverages? Yes - Drinking mostly water - avoiding juice, soda, tea    Has pt sampled Unjury and/or Nectar protein? Tried and tolerated     Has pt attended a support group? Completed    Participating in intentional exercise? Pt recently was in hospital to get cysts drained     Plan/Recommendations:   Open fasting window and eat smaller frequent meals eating at least 4 x day.      Handouts: none     Joana Wiggins, MISSY, LD

## 2022-01-27 NOTE — PROGRESS NOTES
[facility]) Physicians   [facility]  Shannon Castro MD, [address] [facility], [address] [place] [address]    Lynette Dumont [address] . Phone: [phone]  Fax: [phone]          Chief Complaint   Patient presents with    Obesity     4th pre-surg         HPI:     Dimitry Khalil is a very pleasant 31 y.o. female with Body mass index is 45.01 kg/m². / Chronic Obesity. Julian Quesada has been struggling for several years now with obesity. Julian Quesada feels the weight is an obstacle to achieve and perform things in daily living as well risk on health. Patient  is very determined to lose weight and be healthy, and is working towards  surgical weight loss to achieve this goal. Pre-operative clearance and work up pending. Working hard to keep good dietary habits as well level of activity. Patient denies any nausea, vomiting, fevers, chills, shortness of breath, chest pain, cough, constipation or difficulty urinating.     Pain Assessment   Denies any abdominal pain       Past Medical History:   Diagnosis Date    Allergic rhinitis     Anxiety     Asthma     Chronic GERD 11/30/2021    Hidradenitis     History of prediabetes 2012    Iron deficiency anemia     Menorrhagia     Migraine     Obesity     Obstructive sleep apnea     Pre-operative clearance 10/27/2020    Prediabetes 2/17/2020     Past Surgical History:   Procedure Laterality Date    CHOLECYSTECTOMY      TONSILLECTOMY  1998     Family History   Problem Relation Age of Onset    Other Mother         uterine fibroids    Diabetes Mother     Thyroid Disease Mother     Elevated Lipids Mother     Diabetes Father     Hypertension Father     Stroke Father 64    Sleep Apnea Father     Diabetes Other     Hypertension Other     Other Other         kidney disease (paternal uncle)    Other Maternal Grandmother         diverticulitis    Diabetes Maternal Grandmother     Other Paternal Grandmother         bone cancer     Heart Attack Paternal Grandmother     Colon Cancer Paternal Grandfather      Social History     Tobacco Use    Smoking status: Never Smoker    Smokeless tobacco: Never Used   Substance Use Topics    Alcohol use: Yes     Alcohol/week: 1.0 standard drink     Types: 1 Glasses of wine per week     Comment: belongs to a wine-club-  (once a month )      I counseled the patient on the importance of not smoking and risks of ETOH. Allergies   Allergen Reactions    Seasonal      Vitals:    01/27/22 1311   BP: 124/77   Pulse: 99   Resp: 18   SpO2: 98%   Weight: 296 lb (134.3 kg)   Height: 5' 8\" (1.727 m)       Body mass index is 45.01 kg/m².     Lab Results   Component Value Date    WBC 5.5 12/15/2021    RBC 4.45 12/15/2021    RBC 4.87 02/14/2020    HGB 9.3 12/15/2021    HCT 29.5 12/15/2021    MCV 66.4 12/15/2021    MCH 20.8 12/15/2021    MCHC 31.3 12/15/2021    MPV 7.9 12/15/2021    NEUTOPHILPCT 51.7 12/15/2021    LYMPHOPCT 39.7 12/15/2021    LYMPHOPCT 31.9 02/14/2020    MONOPCT 6.7 12/15/2021    EOSRELPCT 1.7 12/15/2021    BASOPCT 0.2 12/15/2021    NEUTROABS 2.8 12/15/2021    LYMPHSABS 2.2 12/15/2021    MONOSABS 0.4 12/15/2021    EOSABS 0.1 12/15/2021     Lab Results   Component Value Date     12/15/2021    K 4.1 12/15/2021    K 3.8 11/13/2020     12/15/2021    CO2 24 12/15/2021    ANIONGAP 10 12/15/2021    GLUCOSE 96 12/15/2021    BUN 7 12/15/2021    CREATININE 0.5 12/15/2021    LABGLOM >60 12/15/2021    GFRAA >60 12/15/2021    CALCIUM 9.1 12/15/2021    PROT 7.8 12/15/2021    LABALBU 4.2 12/15/2021    AGRATIO 1.2 12/15/2021    BILITOT 0.8 12/15/2021    ALKPHOS 68 12/15/2021    ALT 17 12/15/2021    AST 18 12/15/2021    GLOB 3.4 07/29/2021     Lab Results   Component Value Date    CHOL 162 12/15/2021    TRIG 92 12/15/2021    HDL 45 12/15/2021    LDLCALC 99 12/15/2021    LABVLDL 18 12/15/2021     Lab Results   Component Value Date    TSHREFLEX 1.06 12/15/2021     Lab Results   Component Value Date    IRON 25 12/15/2021    TIBC 382 12/15/2021    LABIRON 7 12/15/2021     Lab Results   Component Value Date    RWBLGPTB50 903 12/15/2021    FOLATE 15.09 12/15/2021     Lab Results   Component Value Date    VITD25 6.4 12/15/2021     Lab Results   Component Value Date    LABA1C 6.3 12/15/2021    .1 12/15/2021         Current Outpatient Medications:     minocycline (MINOCIN;DYNACIN) 100 MG capsule, Take 1 capsule by mouth 2 times daily, Disp: 20 capsule, Rfl: 0    venlafaxine (EFFEXOR XR) 37.5 MG extended release capsule, TAKE 1 CAPSULE BY MOUTH EVERY DAY, Disp: 60 capsule, Rfl: 2    vitamin D (CHOLECALCIFEROL) 48449 UNIT CAPS, Take 1 capsule by mouth once a week, Disp: 12 capsule, Rfl: 0    Cholecalciferol 50 MCG (2000 UT) TABS, Take 1 tablet by mouth daily Take 1 tablet by mouth daily. Start this medication after you finish the weekly regimen, Disp: 90 tablet, Rfl: 2    Benzoyl Peroxide (BENZAC AC) 10 % external wash, Apply topically 2 times daily. , Disp: 187 g, Rfl: 3    cetirizine (ZYRTEC) 10 MG tablet, TAKE 1 TABLET BY MOUTH EVERY DAY, Disp: 30 tablet, Rfl: 4    montelukast (SINGULAIR) 10 MG tablet, TAKE 1 TABLET BY MOUTH EVERY DAY AT NIGHT, Disp: 90 tablet, Rfl: 1    albuterol sulfate HFA (VENTOLIN HFA) 108 (90 Base) MCG/ACT inhaler, Inhale 2 puffs into the lungs every 6 hours as needed for Wheezing, Disp: 1 Inhaler, Rfl: 3    ferrous sulfate (FE TABS) 325 (65 Fe) MG EC tablet, Take 1 tablet by mouth daily (with breakfast) (take with VitC for improved absorption), Disp: 60 tablet, Rfl: 3    fluticasone (FLONASE) 50 MCG/ACT nasal spray, SHAKE LIQUID AND USE 1 SPRAY IN EACH NOSTRIL DAILY, Disp: 1 Bottle, Rfl: 2    ondansetron (ZOFRAN) 4 MG tablet, Take 1 tablet by mouth daily as needed for Nausea or Vomiting, Disp: 30 tablet, Rfl: 0    ferrous sulfate (IRON 325) 325 (65 Fe) MG tablet, Take 1 tablet by mouth 2 times daily (with meals) (Patient not taking: Reported on 1/12/2022), Disp: 60 tablet, Rfl: 3    metFORMIN (GLUCOPHAGE-XR) 500 MG extended release tablet, Take 1 tablet by mouth daily (with breakfast) (Patient taking differently: Take 500 mg by mouth 2 times daily ), Disp: 90 tablet, Rfl: 1    Review of Systems - History obtained from the patient  General ROS: negative  Psychological ROS: negative  Ophthalmic ROS: negative  Neurological ROS: negative  ENT ROS: negative  Allergy and Immunology ROS: negative  Hematological and Lymphatic ROS: negative  Endocrine ROS: negative  Breast ROS: negative  Respiratory ROS: negative  Cardiovascular ROS: negative  Gastrointestinal ROS:negative  Genito-Urinary ROS: negative  Musculoskeletal ROS: negative   Skin ROS: negative    Physical Exam   Vitals Reviewed   Constitutional: Patient is oriented to person, place, and time. Patient appears well-developed and well-nourished. Patient is active and cooperative. Non-toxic appearance. No distress. HENT:   Head: Normocephalic and atraumatic. Head is without abrasion and without laceration. Hair is normal.   Right Ear: External ear normal. No lacerations. No drainage, swelling . Left Ear: External ear normal. No lacerations. No drainage, swelling. Nose/Mouth: face mask in place  Eyes: Conjunctivae, EOM and lids are normal. Right eye exhibits no discharge. No foreign body present in the right eye. Left eye exhibits no discharge. No foreign body present in the left eye. No scleral icterus. Neck: Trachea normal and normal range of motion. No JVD present. Pulmonary/Chest: Effort normal. No accessory muscle usage or stridor. No apnea. No respiratory distress. Cardiovascular: Normal rate and no JVD. Abdominal: Normal appearance. Patient exhibits no distension. Abdomen is soft, obese, non tender. Musculoskeletal: Normal range of motion. Patient exhibits no edema. Neurological: Patient is alert and oriented to person, place, and time. Patient has normal strength. GCS eye subscore is 4. GCS verbal subscore is 5.  GCS motor subscore is 6.   Skin: Skin is warm and dry. No abrasion and no rash noted. Patient is not diaphoretic. No cyanosis or erythema. Psychiatric: Patient has a normal mood and affect. Speech is normal and behavior is normal. Cognition and memory are normal.       A/P    Dimitry Khalil is 31 y.o. female, Body mass index is 45.01 kg/m². pre surgery, has lost 1 lb since last visit. The patient underwent extensive dietary counseling with registered dietician. I have reviewed, discussed and agree with the dietary plan. Patient is trying hard to keep good dietary and behavior modifications. Patient is monitoring portion sizes, food choices and liquid calories. Patient is trying to exercise regularly as much as possible. Obesity as a disease is considered a high risk to patients overall health and should therefore be considered a high risk disease state. Advised the patient that not getting there weight under control, that could increase risk of complications/worsening of those conditions on the long-term. (Goal of weight loss surgery is to alleviate/control some of those co-morbidities)    Now with Covid-19 pandemic, CDC and health authorities does classify obese patients as vulnerable and high risk as well. Which makes weight loss a priority for improvement of their wellbeing and overall health. I encouraged the patient to continue exercise and keeping healthy eating habits. Discussed pre-op labs and work up till now. Also counseled the patient extensively on Surgery.        The visit today, included any number of the following: Bariatric Preoperative work up/protocols, review of labs, imaging, provider notes, outside hospital records, performing examination/evaluation, counseling patient and/or family, ordering medications/tests, placing referrals and communication with referring physicians, coordination of care; discussing dietary plan/recall with the patient as well with registered dietitian and documentation in the EHR. Of note, the above was done during same day of the actual patient encounter. Wendy Ward is here for her 4th presurgical visit. I reviewed with her her CPAP compliance from her phone which showed that she is compliant. Patient saw hematology and was advised that she is cannot be on oral iron supplements and if that does not improve then she can get iron transfusion. Patient is also seeing dermatology in April for her recurrent hidradenitis suppurativa. Patient scheduling her psych evaluation and has had endoscopy on February 25. We will see the patient next month for continued follow-up. We discussed how her excess weight affects her overall health and importance of weight loss, healthy diet and active lifestyle to alleviate those co morbid conditions, otherwise risk deterioration. Morbid Obesity: Body mass index is 45.01 kg/m². [x] Continue to make dietary and lifestyle modifications. [x] Plan for Future laparoscopic sleeve gastrectomy. [x] Return for follow-up next month. Chronic GERD:   [x] Continue to make dietary and lifestyle modifications. [x] Plan for EGD to evaluate the stomach. Prediabetes:   [x] Continue to make dietary and lifestyle modifications. [x] Reviewed the importance of checking blood sugars. [x] Continue to follow up with their PCP for medication management and monitoring. Obstructive Sleep Apnea:   [x] Continue to make dietary and lifestyle modifications. [x] Reviewed the importance of wearing / compliance with CPAP/BIPAP. [x] Continue to follow up with their sleep medicine provider for CPAP/BIPAP management and monitoring. Patient advised that its their responsibility to follow up for studies, referrals and/or labs ordered today.

## 2022-02-07 ENCOUNTER — OFFICE VISIT (OUTPATIENT)
Dept: PRIMARY CARE CLINIC | Age: 27
End: 2022-02-07
Payer: COMMERCIAL

## 2022-02-07 VITALS
WEIGHT: 292 LBS | SYSTOLIC BLOOD PRESSURE: 105 MMHG | DIASTOLIC BLOOD PRESSURE: 77 MMHG | BODY MASS INDEX: 43.25 KG/M2 | TEMPERATURE: 97.6 F | HEIGHT: 69 IN | HEART RATE: 89 BPM | OXYGEN SATURATION: 99 %

## 2022-02-07 DIAGNOSIS — E66.01 MORBID OBESITY WITH BMI OF 45.0-49.9, ADULT (HCC): Primary | ICD-10-CM

## 2022-02-07 DIAGNOSIS — R03.0 ELEVATED BLOOD PRESSURE READING: ICD-10-CM

## 2022-02-07 DIAGNOSIS — R00.2 PALPITATIONS: ICD-10-CM

## 2022-02-07 DIAGNOSIS — D50.0 IRON DEFICIENCY ANEMIA DUE TO CHRONIC BLOOD LOSS: ICD-10-CM

## 2022-02-07 DIAGNOSIS — R73.03 PREDIABETES: ICD-10-CM

## 2022-02-07 DIAGNOSIS — G47.33 OBSTRUCTIVE SLEEP APNEA: ICD-10-CM

## 2022-02-07 PROCEDURE — 1036F TOBACCO NON-USER: CPT | Performed by: NURSE PRACTITIONER

## 2022-02-07 PROCEDURE — 99213 OFFICE O/P EST LOW 20 MIN: CPT | Performed by: NURSE PRACTITIONER

## 2022-02-07 PROCEDURE — G8484 FLU IMMUNIZE NO ADMIN: HCPCS | Performed by: NURSE PRACTITIONER

## 2022-02-07 PROCEDURE — G8427 DOCREV CUR MEDS BY ELIG CLIN: HCPCS | Performed by: NURSE PRACTITIONER

## 2022-02-07 PROCEDURE — G8417 CALC BMI ABV UP PARAM F/U: HCPCS | Performed by: NURSE PRACTITIONER

## 2022-02-07 RX ORDER — FERROUS SULFATE 325(65) MG
325 TABLET ORAL 2 TIMES DAILY WITH MEALS
Qty: 60 TABLET | Refills: 3 | Status: SHIPPED | OUTPATIENT
Start: 2022-02-07

## 2022-02-07 ASSESSMENT — ENCOUNTER SYMPTOMS
CHEST TIGHTNESS: 0
CONSTIPATION: 0
DIARRHEA: 0
ROS SKIN COMMENTS: HIDRADENITIS
ABDOMINAL PAIN: 0
NAUSEA: 0
BLOOD IN STOOL: 0
SHORTNESS OF BREATH: 0
VOMITING: 0

## 2022-02-07 NOTE — PATIENT INSTRUCTIONS
Check blood pressure 3-4 times/ weekly  BP less than 130/80  Make life style changes:  Exercise for at least 30 minutes 3 times weekly. Decrease fatty, fried, fast and processed foods. Check pulse during feeling of palpitations, chest pain.

## 2022-02-07 NOTE — PROGRESS NOTES
Jeremias Weems (:  1995) is a 31 y.o. female,Established patient, here for evaluation of the following chief complaint(s):  Follow-up (ED cyst drainage, heart palp) and Hypertension         ASSESSMENT/PLAN:  1. Morbid obesity with BMI of 45.0-49.9, adult ([facility] [address]  -The patient is advised to follow a low fat, low cholesterol diet, attempt to lose weight, reduce salt in diet and cooking, improve dietary compliance, continue current medications and continue current healthy lifestyle patterns.  -continue with weight management  2. Iron deficiency anemia due to chronic blood loss  -     ferrous sulfate (IRON 325) 325 (65 Fe) MG tablet; Take 1 tablet by mouth 2 times daily (with meals), Disp-60 tablet, R-3Normal  3. Palpitations  -Obtain pulse oximeter, recording readings  -check pulse if having palpitations  4. Prediabetes  -continue Metformin   5. Obstructive sleep apnea  -Continue use of sleep device   6. Elevated Blood pressure readings  -Obtain BP cuff  -Record readings and send through My Chart    Return in about 3 months (around 2022). Subjective   SUBJECTIVE/OBJECTIVE:  HPI     Hidradenitis   Evaluated  in emergency department for abscess on thigh, prescribed antibiotics. Returned on  with abscess on lower abdomen, which was drained. Antibiotics prescribed. Completed antibiotic therapy, no additional abscesses today. Palpitations    report she has been experiencing occasional palpitations, mostly at night, also on rare occasion an episode of palpitations during the day. Evaluated by Cardiology for weight management on , no new findings. Evaluated in emergency department on  &  for palpitations, referred to [address] [place] [address] Cardiology, had 24 hour holter monitor. Followed up with Cardiology, no new prescriptions. CXR and labs-  no acute abnormalities. She does not believe palpitations are related to anxiety. Has cut out caffeine in diet.     Holter Monitor results: 1/30/2022  Avg. Heart Rate:  94  BPM   Max. Heart Rate:  963  BPM   Min. Heart Rate:  76  BPM   LONGEST RR:  1.148  S   :  Normal holter ^ min HR is 76 ^ max HR is 144 ^ Normal sinus rhythm ^ PVCs 528 beats ^ no arrhythmias or heart blocks. Covid infection in January, 2022    Iron Deficiency  Referred to Hematology by Weight management for low H/H. Hematology prescribed iron supplement. H/H improved: 9.6/30.5-->12.0/36.8    Prediabetes  A1C 6.3, takes Metformin, tolerates well. Has heavy menses, gynecology reports it is related to weight. tried BCP which caused frequent menses therefore she discontinued. Has ALPHONSO, uses sleep device. Has elevated blood pressure readings in the emergency department and other office visits    Obesity  General weight loss/lifestyle modification strategies discussed (elicit support from others; identify saboteurs; non-food rewards, etc). Goals:   -Eat 4-5 times daily  -Avoid high fat and high sugar foods  -Include protein with all meals and snacks  -Avoid carbonation and caffeine  -Avoid calorie containing beverages  -Increase physical activity as tolerated    Overweight/Obesity related to lack of exercise, sedentary lifestyle, unhealthy eating habits, and unsuccessful diet attempts as evidenced by BMI.     @bmi: 43.12  Wt Readings from Last 3 Encounters:   02/07/22 292 lb (132.5 kg)   01/27/22 296 lb (134.3 kg)   01/14/22 295 lb (133.8 kg)              Review of Systems   Constitutional: Negative for activity change and fever. Covid infection 1/2022   HENT: Negative for congestion. Eyes: Negative for visual disturbance. Respiratory: Negative for chest tightness and shortness of breath. ALPHONSO-sleep device   Cardiovascular: Negative for chest pain, palpitations and leg swelling. Papitations   Gastrointestinal: Negative for abdominal pain, blood in stool, constipation, diarrhea, nausea and vomiting. Endocrine: Negative for polyuria. Genitourinary: Negative for dysuria and menstrual problem. Musculoskeletal: Negative for arthralgias and myalgias. Skin: Negative for rash. Hidradenitis    Neurological: Negative for dizziness, light-headedness and headaches. Psychiatric/Behavioral: Negative for agitation, decreased concentration and sleep disturbance. The patient is not nervous/anxious. Anxiety          Objective    Past Medical History:   Diagnosis Date    Allergic rhinitis     Anxiety     Asthma     Chronic GERD 11/30/2021    Hidradenitis     History of prediabetes 2012    Iron deficiency anemia     Menorrhagia     Migraine     Obesity     Obstructive sleep apnea     Pre-operative clearance 10/27/2020    Prediabetes 2/17/2020     Past Surgical History:   Procedure Laterality Date    CHOLECYSTECTOMY      TONSILLECTOMY  1998     Outpatient Encounter Medications as of 2/7/2022   Medication Sig Dispense Refill    ferrous sulfate (IRON 325) 325 (65 Fe) MG tablet Take 1 tablet by mouth 2 times daily (with meals) 60 tablet 3    venlafaxine (EFFEXOR XR) 37.5 MG extended release capsule TAKE 1 CAPSULE BY MOUTH EVERY DAY 60 capsule 2    vitamin D (CHOLECALCIFEROL) 84986 UNIT CAPS Take 1 capsule by mouth once a week 12 capsule 0    Benzoyl Peroxide (BENZAC AC) 10 % external wash Apply topically 2 times daily.  187 g 3    cetirizine (ZYRTEC) 10 MG tablet TAKE 1 TABLET BY MOUTH EVERY DAY 30 tablet 4    montelukast (SINGULAIR) 10 MG tablet TAKE 1 TABLET BY MOUTH EVERY DAY AT NIGHT 90 tablet 1    albuterol sulfate HFA (VENTOLIN HFA) 108 (90 Base) MCG/ACT inhaler Inhale 2 puffs into the lungs every 6 hours as needed for Wheezing 1 Inhaler 3    metFORMIN (GLUCOPHAGE-XR) 500 MG extended release tablet Take 1 tablet by mouth daily (with breakfast) (Patient taking differently: Take 500 mg by mouth 2 times daily ) 90 tablet 1    fluticasone (FLONASE) 50 MCG/ACT nasal spray SHAKE LIQUID AND USE 1 SPRAY IN EACH NOSTRIL DAILY 1 Bottle 2    [DISCONTINUED] ondansetron (ZOFRAN) 4 MG tablet Take 1 tablet by mouth daily as needed for Nausea or Vomiting (Patient not taking: Reported on 2/7/2022) 30 tablet 0    [DISCONTINUED] minocycline (MINOCIN;DYNACIN) 100 MG capsule Take 1 capsule by mouth 2 times daily (Patient not taking: Reported on 2/7/2022) 20 capsule 0    [DISCONTINUED] Cholecalciferol 50 MCG (2000 UT) TABS Take 1 tablet by mouth daily Take 1 tablet by mouth daily. Start this medication after you finish the weekly regimen (Patient not taking: Reported on 2/7/2022) 90 tablet 2    [DISCONTINUED] ferrous sulfate (IRON 325) 325 (65 Fe) MG tablet Take 1 tablet by mouth 2 times daily (with meals) 60 tablet 3    [DISCONTINUED] ferrous sulfate (FE TABS) 325 (65 Fe) MG EC tablet Take 1 tablet by mouth daily (with breakfast) (take with VitC for improved absorption) 60 tablet 3     No facility-administered encounter medications on file as of 2/7/2022.    height is 5' 9\" (1.753 m) and weight is 292 lb (132.5 kg). Her temperature is 97.6 °F (36.4 °C). Her blood pressure is 105/77 and her pulse is 89. Her oxygen saturation is 99%. Physical Exam  Vitals reviewed. Constitutional:       Appearance: She is well-developed. HENT:      Head: Normocephalic. Right Ear: Hearing and external ear normal.      Left Ear: Hearing and external ear normal.      Nose: Nose normal.   Eyes:      General: Lids are normal.   Cardiovascular:      Rate and Rhythm: Normal rate and regular rhythm. Heart sounds: Normal heart sounds, S1 normal and S2 normal.   Pulmonary:      Effort: Pulmonary effort is normal.      Breath sounds: Normal breath sounds. Musculoskeletal:         General: Normal range of motion. Skin:     General: Skin is warm and dry. Findings: No rash. Neurological:      Mental Status: She is alert and oriented to person, place, and time. GCS: GCS eye subscore is 4. GCS verbal subscore is 5. GCS motor subscore is 6. Psychiatric:         Speech: Speech normal.         Behavior: Behavior normal. Behavior is cooperative. On this date 2/7/2022 I have spent 25 minutes reviewing previous notes, test results and face to face with the patient discussing the diagnosis and importance of compliance with the treatment plan as well as documenting on the day of the visit. An electronic signature was used to authenticate this note.     --CASPER Joiner - CNP

## 2022-02-09 PROBLEM — Z01.810 PREOP CARDIOVASCULAR EXAM: Status: RESOLVED | Noted: 2022-01-10 | Resolved: 2022-02-09

## 2022-02-10 ENCOUNTER — TELEPHONE (OUTPATIENT)
Dept: CARDIOLOGY CLINIC | Age: 27
End: 2022-02-10

## 2022-02-10 DIAGNOSIS — R00.2 PALPITATIONS: Primary | ICD-10-CM

## 2022-02-10 PROCEDURE — 93228 REMOTE 30 DAY ECG REV/REPORT: CPT | Performed by: INTERNAL MEDICINE

## 2022-02-10 NOTE — TELEPHONE ENCOUNTER
Per [address] he wants a 30 day monitor  placed. Please call Pt to schedule ASAP monitor and OV in 6 weeks.

## 2022-02-10 NOTE — TELEPHONE ENCOUNTER
Called and spoke with patient in great deal and informed her that she must wear the device when placed. Or perhaps if her symtoms are not serious enough she could hold off until she returns to the [facility]. She states that she is going to [place] on March 15 th. I ask if we could move her placement up? She felt better doing that. appt changed for placement on 2/14/2022 at 14:00pm. All instructions were given to her by phone. I did stated to her that we will reiterate that she must wear the device until the end of duration. She will schedule follow up appt when she comes into office for placement.

## 2022-02-10 NOTE — TELEPHONE ENCOUNTER
Pt is scheduled for monitor placement on 2/21/22. That was the first she was available. Pt states she is going out of country in the middle of March and will be gone for 2 weeks. Asking if monitor could be worn less time. Please advise and call pt.

## 2022-02-10 NOTE — TELEPHONE ENCOUNTER
Pt calling requesting to schedule an apt with CARMEL[facility]. Patient states she was told to call back and schedule apt if symptoms has worsen. Patient states she has had increased heart palpations. [facility] does not have openings until 02/28. Should patient get scheduled at next available or placed elsewhere?

## 2022-02-12 ENCOUNTER — TELEMEDICINE (OUTPATIENT)
Dept: BARIATRICS/WEIGHT MGMT | Age: 27
End: 2022-02-12
Payer: COMMERCIAL

## 2022-02-12 DIAGNOSIS — E66.01 MORBID OBESITY WITH BMI OF 40.0-44.9, ADULT (HCC): Primary | ICD-10-CM

## 2022-02-12 DIAGNOSIS — Z71.3 DIETARY COUNSELING AND SURVEILLANCE: ICD-10-CM

## 2022-02-12 PROCEDURE — 99204 OFFICE O/P NEW MOD 45 MIN: CPT | Performed by: FAMILY MEDICINE

## 2022-02-12 PROCEDURE — G8427 DOCREV CUR MEDS BY ELIG CLIN: HCPCS | Performed by: FAMILY MEDICINE

## 2022-02-12 ASSESSMENT — ENCOUNTER SYMPTOMS
ABDOMINAL PAIN: 0
APNEA: 0
DIARRHEA: 0
COUGH: 0
ABDOMINAL DISTENTION: 0
VOMITING: 0
CONSTIPATION: 0
NAUSEA: 0
EYE PAIN: 0
PHOTOPHOBIA: 0
CHOKING: 0
BLOOD IN STOOL: 0
WHEEZING: 0
CHEST TIGHTNESS: 0
SHORTNESS OF BREATH: 0

## 2022-02-12 NOTE — PROGRESS NOTES
Patient: Van Cline     Encounter Date: 2/12/2022    YOB: 1995               Age: 31 y.o. Patient identification was verified at the start of the visit. Patient-Reported Vitals 2/12/2022   Patient-Reported Weight 294   Patient-Reported Height 59   Patient-Reported Systolic 180   Patient-Reported Diastolic 90   Patient-Reported Pulse 91   Patient-Reported Temperature 98   Patient-Reported SpO2 0   Patient-Reported Peak Flow -         BP Readings from Last 1 Encounters:   02/07/22 105/77       BMI Readings from Last 1 Encounters:   02/07/22 43.12 kg/m²       Pulse Readings from Last 1 Encounters:   02/07/22 89                                               Chief Complaint   Patient presents with    Bariatric, Initial Visit     MWM- NP       HPI:    31 y.o. female presents to establish care via video visit. She was referred by Dr. Yolis Kahn for weight management. The patient's medical history is significant for class III obesity. The patient has a long-standing history of obesity which started gradually. The problem is severe. The patient has been gaining weight. Risk factors include annual weight gain of >2 lbs (1 kg)/ year and sedentary lifestyle. Aggravating factors include poor diet and lack of physical activity. The patient has tried various diet/exercise plans which have been ineffective in the long-run. She is motivated to lose weight to help improve her overall health. Interested in learning more about aom, particularly Wegovy. Highest adult weight: 305 pounds at age 31    Triggers for weight gain? [] Stress   [] Illness   [] Medications   [] Travel  []Injury     [] Nightshift work   [] Insomnia  [x] No specific triggers   [] Other    Food triggers:   [x] Stress   [x] Boredom   [x] Fast food   [] Eating out   [] Seeking reward   [] Social     Have you ever taken medications to help you lose weight?    [] Yes  [x] No    Have you ever been on a meal replacement program?  [] Yes  [x] No        Dietitian's assessment reviewed and addressed with patient. Reviewed:  [x] Nutrition and the importance of regular protein intake  [x] Hidden CHO/carbohydrate sources  [x] Alcohol use  [x] Tobacco use  [x] Drug use- Denies   [x] Importance of exercise and reducing sedentary time        Controlled Substance Monitoring:     No flowsheet data found. Allergies   Allergen Reactions    Seasonal          Current Outpatient Medications:     ferrous sulfate (IRON 325) 325 (65 Fe) MG tablet, Take 1 tablet by mouth 2 times daily (with meals), Disp: 60 tablet, Rfl: 3    venlafaxine (EFFEXOR XR) 37.5 MG extended release capsule, TAKE 1 CAPSULE BY MOUTH EVERY DAY, Disp: 60 capsule, Rfl: 2    vitamin D (CHOLECALCIFEROL) 52666 UNIT CAPS, Take 1 capsule by mouth once a week, Disp: 12 capsule, Rfl: 0    Benzoyl Peroxide (BENZAC AC) 10 % external wash, Apply topically 2 times daily. , Disp: 187 g, Rfl: 3    cetirizine (ZYRTEC) 10 MG tablet, TAKE 1 TABLET BY MOUTH EVERY DAY, Disp: 30 tablet, Rfl: 4    montelukast (SINGULAIR) 10 MG tablet, TAKE 1 TABLET BY MOUTH EVERY DAY AT NIGHT, Disp: 90 tablet, Rfl: 1    albuterol sulfate HFA (VENTOLIN HFA) 108 (90 Base) MCG/ACT inhaler, Inhale 2 puffs into the lungs every 6 hours as needed for Wheezing, Disp: 1 Inhaler, Rfl: 3    metFORMIN (GLUCOPHAGE-XR) 500 MG extended release tablet, Take 1 tablet by mouth daily (with breakfast) (Patient taking differently: Take 500 mg by mouth 2 times daily ), Disp: 90 tablet, Rfl: 1    fluticasone (FLONASE) 50 MCG/ACT nasal spray, SHAKE LIQUID AND USE 1 SPRAY IN EACH NOSTRIL DAILY, Disp: 1 Bottle, Rfl: 2    Patient Active Problem List   Diagnosis    Asthma    Iron deficiency anemia    Lichen simplex    Menorrhagia with regular cycle    Generalized anxiety disorder    Seasonal allergic rhinitis due to pollen    Hidradenitis    Palpitations    Abscess of groin, right    Menorrhagia    Prediabetes    Obstructive confusion, decreased concentration, dysphoric mood, hallucinations, sleep disturbance and suicidal ideas. The patient is not nervous/anxious and is not hyperactive. Physical Exam  Constitutional:       Appearance: She is well-developed. HENT:      Head: Normocephalic. Eyes:      Conjunctiva/sclera: Conjunctivae normal.   Abdominal:      General: Abdomen is protuberant. Musculoskeletal:         General: No swelling. Neurological:      Mental Status: She is alert and oriented to person, place, and time. Psychiatric:         Mood and Affect: Mood normal.         Behavior: Behavior normal.         Thought Content: Thought content normal.         Judgment: Judgment normal.         Hospital Outpatient Visit on 12/15/2021   Component Date Value Ref Range Status    Protime 12/15/2021 11.9  9.9 - 12.7 sec Final    Comment: Effective 6-30-21 09:00am EST  Please note reference ranges have  changed for PT and INR Testing.  INR 12/15/2021 1.05  0.88 - 1.12 Final    Comment: Effective 6/30/21 at 09:00am EST    Normal: 0.88 - 1.12  Therapeutic: 2.0 - 3.0  Pros. Valve: 2.5 - 3.5  AMI: 2.0 - 3.0      Alpha-Tocopherol 12/15/2021 8.4  5.5 - 18.0 mg/L Final    Comment: This test was developed and its performance characteristics determined  by  [facility]. It has not been cleared or approved by the [employer]  and  Drug Administration. This test was performed in a CLIA certified  laboratory  and is intended for clinical purposes.  Gamma-Tocopherol 12/15/2021 1.7  0.0 - 6.0 mg/L Final    Comment: Performed By: [facility] [address]  [place], [address] [facility]  : Felipe Maria. Nichol Zazueta MD      Vit D, 25-Hydroxy 12/15/2021 6.4* >=30 ng/mL Final    Comment: <=20 ng/mL. ........... [place] Deficient  21-29 ng/mL. ......... [place] Insufficient  >=30 ng/mL. ........ [place] Sufficient      Vitamin B-12 12/15/2021 557  211 - 911 pg/mL Final    Folate 12/15/2021 15.09  4.78 - 24.20 ng/mL Final    Comment: Effective 11-15-16 10:00am EST  Please note reference ranges have  changed for Folate.  Vitamin B1,Whole Blood 12/15/2021 88  70 - 180 nmol/L Final    Comment: INTERPRETIVE INFORMATION: Vitamin B1, Whole Blood  This assay measures the concentration of thiamine diphosphate (TDP),  the  primary active form of vitamin B1. Approximately 90 percent of vitamin  B1  present in whole blood is TDP. Thiamine and thiamine monophosphate,  which  comprise the remaining 10 percent, are not measured. This test was developed and its performance characteristics determined  by  [employer]. It has not been cleared or approved by the [employer]  and  Drug Administration. This test was performed in a CLIA certified  laboratory  and is intended for clinical purposes. Performed By: [facility] [address]  [place], [address] [facility]  : Mariah Renner. Vianey Angel MD      Vitamin A 12/15/2021 0.32  0.30 - 1.20 mg/L Final    Vitamin A, Interp 12/15/2021 Normal   Final    Comment: This test was developed and its performance characteristics determined  by  [facility]. It has not been cleared or approved by the [employer]  and  Drug Administration. This test was performed in a CLIA certified  laboratory  and is intended for clinical purposes. Performed By: [facility] [address]  [place], [address] [facility]  : Mariah Renner.  Vianey Angel MD      RETINYL PALMITATE 12/15/2021 <0.02  0.00 - 0.10 mg/L Final    TSH 12/15/2021 1.06  0.27 - 4.20 uIU/mL Final    Cholesterol, Total 12/15/2021 162  0 - 199 mg/dL Final    Triglycerides 12/15/2021 92  0 - 150 mg/dL Final    HDL 12/15/2021 45  40 - 60 mg/dL Final    LDL Calculated 12/15/2021 99  <100 mg/dL Final    VLDL Cholesterol Calculated 12/15/2021 18  Not Established mg/dL Final    Iron 12/15/2021 25* 37 - 145 ug/dL Final    TIBC 12/15/2021 382  260 - 445 ug/dL Final    Iron Saturation 12/15/2021 7* 15 - 50 % Final    Hemoglobin A1C 12/15/2021 6.3  See comment % Final    Comment: Comment:  Diagnosis of Diabetes: > or = 6.5%  Increased risk of diabetes (Prediabetes): 5.7-6.4%  Glycemic Control: Nonpregnant Adults: <7.0%                    Pregnant: <6.0%        eAG 12/15/2021 134.1  mg/dL Final    Sodium 12/15/2021 136  136 - 145 mmol/L Final    Potassium 12/15/2021 4.1  3.5 - 5.1 mmol/L Final    Chloride 12/15/2021 102  99 - 110 mmol/L Final    CO2 12/15/2021 24  21 - 32 mmol/L Final    Anion Gap 12/15/2021 10  3 - 16 Final    Glucose 12/15/2021 96  70 - 99 mg/dL Final    BUN 12/15/2021 7  7 - 20 mg/dL Final    CREATININE 12/15/2021 0.5* 0.6 - 1.1 mg/dL Final    GFR Non- 12/15/2021 >60  >60 Final    Comment: >60 mL/min/1.73m2 EGFR, calc. for ages 25 and older using the  MDRD formula (not corrected for weight), is valid for stable  renal function.  GFR  12/15/2021 >60  >60 Final    Comment: Chronic Kidney Disease: less than 60 ml/min/1.73 sq.m. Kidney Failure: less than 15 ml/min/1.73 sq.m. Results valid for patients 18 years and older.       Calcium 12/15/2021 9.1  8.3 - 10.6 mg/dL Final    Total Protein 12/15/2021 7.8  6.4 - 8.2 g/dL Final    Albumin 12/15/2021 4.2  3.4 - 5.0 g/dL Final    Albumin/Globulin Ratio 12/15/2021 1.2  1.1 - 2.2 Final    Total Bilirubin 12/15/2021 0.8  0.0 - 1.0 mg/dL Final    Alkaline Phosphatase 12/15/2021 68  40 - 129 U/L Final    ALT 12/15/2021 17  10 - 40 U/L Final    AST 12/15/2021 18  15 - 37 U/L Final    WBC 12/15/2021 5.5  4.0 - 11.0 K/uL Final    RBC 12/15/2021 4.45  4.00 - 5.20 M/uL Final    Hemoglobin 12/15/2021 9.3* 12.0 - 16.0 g/dL Final    Hematocrit 12/15/2021 29.5* 36.0 - 48.0 % Final    MCV 12/15/2021 66.4* 80.0 - 100.0 fL Final    Result consistent with patient history    MCH 12/15/2021 20.8* 26.0 - 34.0 pg Final    MCHC 12/15/2021 31.3  31.0 - 36.0 g/dL Final    RDW 12/15/2021 16.1* 12.4 - 15.4 % Final    Platelets 50/55/9443 354  135 - 450 K/uL Final    MPV 12/15/2021 7.9  5.0 - 10.5 fL Final    Path Consult 12/15/2021 No   Final    Previously Sent for Review    Neutrophils % 12/15/2021 51.7  % Final    Lymphocytes % 12/15/2021 39.7  % Final    Monocytes % 12/15/2021 6.7  % Final    Eosinophils % 12/15/2021 1.7  % Final    Basophils % 12/15/2021 0.2  % Final    Neutrophils Absolute 12/15/2021 2.8  1.7 - 7.7 K/uL Final    Lymphocytes Absolute 12/15/2021 2.2  1.0 - 5.1 K/uL Final    Monocytes Absolute 12/15/2021 0.4  0.0 - 1.3 K/uL Final    Eosinophils Absolute 12/15/2021 0.1  0.0 - 0.6 K/uL Final    Basophils Absolute 12/15/2021 0.0  0.0 - 0.2 K/uL Final         Assessment and Plan:    1. Morbid obesity with BMI of 40.0-44.9, adult ([facility] [address]  Heavily counseled on the importance of therapeutic lifestyle changes through diet and exercise. The patient understands that the goal of treatment is to reach and stay at a healthy weight. The initial treatment goal is to lose at least 5-10% of her body weight in 12 weeks. This will require changes in eating habits, increased physical activity, and behavior changes. Counseled on low carb/mariela diet. Reviewed aom options including Wegovy in detail. Explained that unfortunately tx is not covered by her insurance and the high out-of-pocket cost. She may want to discuss starting ozempic for prediabetes with her PCP to also get the benefits of weight loss. She has a f/u appointment with Dr. Melany Sosa later this month. She may reconsider bariatric surgery since aom not covered by her insurance. 2. Dietary counseling and surveillance  Low carb/mariela diet.          Nutrition:  [] LCHF/Ketogenic [x] Low carb/low-calorie diet [] Low-calorie diet     []Maintenance        FITTE:   [x] Cardio [] Resistance/stength exercises   [x] ACSM recommendations (150 minutes/week)           Behavior:   [x] Motivational interviewing performed    [] Referral for counseling  [x] Discussed strategies to overcome habits/challenges for focus      [x] Stress management   [x] Stimulus control  [x] Sleep hygiene      No orders of the defined types were placed in this encounter. No follow-ups on file. Luisana Kebede is a 31 y.o. female being evaluated by a Virtual Visit (video visit) encounter to address concerns as mentioned above. A caregiver was present when appropriate. Due to this being a TeleHealth encounter (During [facility] public [facility] emergency), evaluation of the following organ systems was limited: Vitals/Constitutional/EENT/Resp/CV/GI//MS/Neuro/Skin/Heme-Lymph-Imm. Pursuant to the emergency declaration under the [address] [place] [address], [address] [place] [address] authority and the [employer] Act, this Virtual Visit was conducted with patient's (and/or legal guardian's) consent, to reduce the patient's risk of exposure to COVID-19 and provide necessary medical care. The patient (and/or legal guardian) has also been advised to contact this office for worsening conditions or problems, and seek emergency medical treatment and/or call 911 if deemed necessary. Services were provided through a video synchronous discussion virtually to substitute for in-person clinic visit. Patient and provider were located at their individual homes. --Nidhi Zamora MD on 2/12/2022 at 4:17 PM    An electronic signature was used to authenticate this note.

## 2022-02-18 ENCOUNTER — TELEPHONE (OUTPATIENT)
Dept: BARIATRICS/WEIGHT MGMT | Age: 27
End: 2022-02-18

## 2022-02-18 NOTE — TELEPHONE ENCOUNTER
Patient was scheduled for an EGD for 2/25 and has on a heart monitor for 30 days. Pt cancelled EGD and is asking when she can get an EGD.

## 2022-02-19 DIAGNOSIS — R73.03 PREDIABETES: Primary | ICD-10-CM

## 2022-02-19 DIAGNOSIS — E66.01 MORBID OBESITY WITH BMI OF 45.0-49.9, ADULT (HCC): ICD-10-CM

## 2022-02-23 ENCOUNTER — TELEPHONE (OUTPATIENT)
Dept: ADMINISTRATIVE | Age: 27
End: 2022-02-23

## 2022-02-23 ENCOUNTER — TELEPHONE (OUTPATIENT)
Dept: CARDIOLOGY CLINIC | Age: 27
End: 2022-02-23

## 2022-02-23 NOTE — TELEPHONE ENCOUNTER
Submitted PA for Ozempic (0.25 or 0.5 MG/DOSE) 2MG/1.5ML pen-injectors    Via CMM Key: [id number] STATUS: APPROVED. Please notify patient.  Thank you

## 2022-02-23 NOTE — TELEPHONE ENCOUNTER
Patient is at the [address] [place] [address] [facility] and needs Xrays. The dental office needs a faxed consent that they can take the patient's monitor off to do the xray. Please fax to: [phone], phone# [phone].

## 2022-02-23 NOTE — LETTER
[address] Cardiology [address]  [address]  Phone: [phone]  Fax: [phone]    Xiang Kyle MD        February 23, 2022    [address] [address]      Dear To Whom it May Concern: It is OK for the patient Tomasz Pretty to have XRAYS with her Holter monitor still in place. It will not interfere with the device. Thank you. If you have any questions or concerns, please don't hesitate to call.     Sincerely,            Xiang Kyle MD

## 2022-02-25 DIAGNOSIS — R73.03 PREDIABETES: ICD-10-CM

## 2022-02-25 DIAGNOSIS — E66.01 MORBID OBESITY WITH BMI OF 45.0-49.9, ADULT (HCC): ICD-10-CM

## 2022-02-25 NOTE — TELEPHONE ENCOUNTER
PT STATED THAT HER INSURANCE FINALLY APPROVED THE MEDICATION    Ozempic (0.25 or 0.5 MG/DOSE) 2MG/1.5ML pen-injectors, AND NOW  SHE'S NEEDS THE ORDER PUT IN * THE PHARMACY SWITCHED TO [address] [place], [place] [id number].  CALLBACK IF QUESTIONS [phone]

## 2022-03-01 DIAGNOSIS — F41.1 GENERALIZED ANXIETY DISORDER: ICD-10-CM

## 2022-03-01 RX ORDER — VENLAFAXINE HYDROCHLORIDE 37.5 MG/1
CAPSULE, EXTENDED RELEASE ORAL
Qty: 60 CAPSULE | Refills: 3 | Status: SHIPPED | OUTPATIENT
Start: 2022-03-01 | End: 2022-06-27

## 2022-03-03 ENCOUNTER — OFFICE VISIT (OUTPATIENT)
Dept: BARIATRICS/WEIGHT MGMT | Age: 27
End: 2022-03-03
Payer: COMMERCIAL

## 2022-03-03 VITALS
HEIGHT: 68 IN | SYSTOLIC BLOOD PRESSURE: 125 MMHG | BODY MASS INDEX: 44.41 KG/M2 | DIASTOLIC BLOOD PRESSURE: 66 MMHG | OXYGEN SATURATION: 98 % | WEIGHT: 293 LBS | HEART RATE: 87 BPM | RESPIRATION RATE: 18 BRPM

## 2022-03-03 DIAGNOSIS — R14.0 POSTPRANDIAL ABDOMINAL BLOATING: ICD-10-CM

## 2022-03-03 DIAGNOSIS — E66.01 MORBID OBESITY WITH BMI OF 45.0-49.9, ADULT (HCC): Primary | ICD-10-CM

## 2022-03-03 DIAGNOSIS — G47.33 OBSTRUCTIVE SLEEP APNEA: ICD-10-CM

## 2022-03-03 DIAGNOSIS — D50.0 IRON DEFICIENCY ANEMIA DUE TO CHRONIC BLOOD LOSS: ICD-10-CM

## 2022-03-03 DIAGNOSIS — K21.9 CHRONIC GERD: ICD-10-CM

## 2022-03-03 DIAGNOSIS — R73.03 PREDIABETES: ICD-10-CM

## 2022-03-03 PROCEDURE — G8484 FLU IMMUNIZE NO ADMIN: HCPCS | Performed by: SURGERY

## 2022-03-03 PROCEDURE — 1036F TOBACCO NON-USER: CPT | Performed by: SURGERY

## 2022-03-03 PROCEDURE — 99214 OFFICE O/P EST MOD 30 MIN: CPT | Performed by: SURGERY

## 2022-03-03 PROCEDURE — G8427 DOCREV CUR MEDS BY ELIG CLIN: HCPCS | Performed by: SURGERY

## 2022-03-03 PROCEDURE — G8417 CALC BMI ABV UP PARAM F/U: HCPCS | Performed by: SURGERY

## 2022-03-03 NOTE — PROGRESS NOTES
[facility]) Physicians   [facility]  Tigist Niño MD, [address] [facility], [address] [place] [address]    [place]  [address] . Phone: [phone]  Fax: [phone]          Chief Complaint   Patient presents with    Obesity     5th pre-surg         HPI:     Sailaja Ramírez is a very pleasant 31 y.o. female with Body mass index is 44.55 kg/m². / Chronic Obesity. Billy Lopez has been struggling for several years now with obesity. Billy Lopez feels the weight is an obstacle to achieve and perform things in daily living as well risk on health. Patient  is very determined to lose weight and be healthy, and is working towards  surgical weight loss to achieve this goal. Pre-operative clearance and work up pending. Working hard to keep good dietary habits as well level of activity. Patient denies any nausea, vomiting, fevers, chills, shortness of breath, chest pain, cough, constipation or difficulty urinating.     Pain Assessment   Denies any abdominal pain       Past Medical History:   Diagnosis Date    Allergic rhinitis     Anxiety     Asthma     Chronic GERD 11/30/2021    Hidradenitis     History of prediabetes 2012    Iron deficiency anemia     Menorrhagia     Migraine     Obesity     Obstructive sleep apnea     Pre-operative clearance 10/27/2020    Prediabetes 2/17/2020     Past Surgical History:   Procedure Laterality Date    CHOLECYSTECTOMY      TONSILLECTOMY  1998     Family History   Problem Relation Age of Onset    Other Mother         uterine fibroids    Diabetes Mother     Thyroid Disease Mother     Elevated Lipids Mother     Diabetes Father     Hypertension Father     Stroke Father 64    Sleep Apnea Father     Diabetes Other     Hypertension Other     Other Other         kidney disease (paternal uncle)    Other Maternal Grandmother         diverticulitis    Diabetes Maternal Grandmother     Other Paternal Grandmother         bone cancer     Heart Attack Paternal Grandmother     Colon Cancer Paternal Grandfather      Social History     Tobacco Use    Smoking status: Never Smoker    Smokeless tobacco: Never Used   Substance Use Topics    Alcohol use: Yes     Alcohol/week: 1.0 standard drink     Types: 1 Glasses of wine per week     Comment: belongs to a wine-club-  (once a month )      I counseled the patient on the importance of not smoking and risks of ETOH. Allergies   Allergen Reactions    Seasonal      Vitals:    03/03/22 1159   BP: 125/66   Pulse: 87   Resp: 18   SpO2: 98%   Weight: 293 lb (132.9 kg)   Height: 5' 8\" (1.727 m)       Body mass index is 44.55 kg/m².     Lab Results   Component Value Date    WBC 5.5 12/15/2021    RBC 4.45 12/15/2021    RBC 4.87 02/14/2020    HGB 9.3 12/15/2021    HCT 29.5 12/15/2021    MCV 66.4 12/15/2021    MCH 20.8 12/15/2021    MCHC 31.3 12/15/2021    MPV 7.9 12/15/2021    NEUTOPHILPCT 51.7 12/15/2021    LYMPHOPCT 39.7 12/15/2021    LYMPHOPCT 31.9 02/14/2020    MONOPCT 6.7 12/15/2021    EOSRELPCT 1.7 12/15/2021    BASOPCT 0.2 12/15/2021    NEUTROABS 2.8 12/15/2021    LYMPHSABS 2.2 12/15/2021    MONOSABS 0.4 12/15/2021    EOSABS 0.1 12/15/2021     Lab Results   Component Value Date     12/15/2021    K 4.1 12/15/2021    K 3.8 11/13/2020     12/15/2021    CO2 24 12/15/2021    ANIONGAP 10 12/15/2021    GLUCOSE 96 12/15/2021    BUN 7 12/15/2021    CREATININE 0.5 12/15/2021    LABGLOM >60 12/15/2021    GFRAA >60 12/15/2021    CALCIUM 9.1 12/15/2021    PROT 7.8 12/15/2021    LABALBU 4.2 12/15/2021    AGRATIO 1.2 12/15/2021    BILITOT 0.8 12/15/2021    ALKPHOS 68 12/15/2021    ALT 17 12/15/2021    AST 18 12/15/2021    GLOB 3.4 07/29/2021     Lab Results   Component Value Date    CHOL 162 12/15/2021    TRIG 92 12/15/2021    HDL 45 12/15/2021    LDLCALC 99 12/15/2021    LABVLDL 18 12/15/2021     Lab Results   Component Value Date    TSHREFLEX 1.06 12/15/2021     Lab Results   Component Value Date    IRON 25 12/15/2021    TIBC 382 12/15/2021    LABIRON 7 12/15/2021     Lab Results   Component Value Date    ECUILEEG45 996 12/15/2021    FOLATE 15.09 12/15/2021     Lab Results   Component Value Date    VITD25 6.4 12/15/2021     Lab Results   Component Value Date    LABA1C 6.3 12/15/2021    .1 12/15/2021         Current Outpatient Medications:     Semaglutide,0.25 or 0.5MG/DOS, 2 MG/1.5ML SOPN, Inject 0.25 mg into the skin once a week, Disp: 4 pen, Rfl: 1    ferrous sulfate (IRON 325) 325 (65 Fe) MG tablet, Take 1 tablet by mouth 2 times daily (with meals), Disp: 60 tablet, Rfl: 3    Benzoyl Peroxide (BENZAC AC) 10 % external wash, Apply topically 2 times daily. , Disp: 187 g, Rfl: 3    cetirizine (ZYRTEC) 10 MG tablet, TAKE 1 TABLET BY MOUTH EVERY DAY, Disp: 30 tablet, Rfl: 4    montelukast (SINGULAIR) 10 MG tablet, TAKE 1 TABLET BY MOUTH EVERY DAY AT NIGHT, Disp: 90 tablet, Rfl: 1    albuterol sulfate HFA (VENTOLIN HFA) 108 (90 Base) MCG/ACT inhaler, Inhale 2 puffs into the lungs every 6 hours as needed for Wheezing, Disp: 1 Inhaler, Rfl: 3    metFORMIN (GLUCOPHAGE-XR) 500 MG extended release tablet, Take 1 tablet by mouth daily (with breakfast) (Patient taking differently: Take 500 mg by mouth 2 times daily ), Disp: 90 tablet, Rfl: 1    fluticasone (FLONASE) 50 MCG/ACT nasal spray, SHAKE LIQUID AND USE 1 SPRAY IN EACH NOSTRIL DAILY, Disp: 1 Bottle, Rfl: 2    venlafaxine (EFFEXOR XR) 37.5 MG extended release capsule, TAKE 1 CAPSULE BY MOUTH EVERY DAY, Disp: 60 capsule, Rfl: 3    vitamin D (CHOLECALCIFEROL) 03056 UNIT CAPS, Take 1 capsule by mouth once a week, Disp: 12 capsule, Rfl: 0    Review of Systems - History obtained from the patient  General ROS: negative  Psychological ROS: negative  Ophthalmic ROS: negative  Neurological ROS: negative  ENT ROS: negative  Allergy and Immunology ROS: negative  Hematological and Lymphatic ROS: negative  Endocrine ROS: negative  Breast ROS: negative  Respiratory ROS: negative  Cardiovascular ROS: negative  Gastrointestinal ROS:negative  Genito-Urinary ROS: negative  Musculoskeletal ROS: negative   Skin ROS: negative    Physical Exam   Vitals Reviewed   Constitutional: Patient is oriented to person, place, and time. Patient appears well-developed and well-nourished. Patient is active and cooperative. Non-toxic appearance. No distress. HENT:   Head: Normocephalic and atraumatic. Head is without abrasion and without laceration. Hair is normal.   Right Ear: External ear normal. No lacerations. No drainage, swelling . Left Ear: External ear normal. No lacerations. No drainage, swelling. Nose/Mouth: face mask in place  Eyes: Conjunctivae, EOM and lids are normal. Right eye exhibits no discharge. No foreign body present in the right eye. Left eye exhibits no discharge. No foreign body present in the left eye. No scleral icterus. Neck: Trachea normal and normal range of motion. No JVD present. Pulmonary/Chest: Effort normal. No accessory muscle usage or stridor. No apnea. No respiratory distress. Cardiovascular: Normal rate and no JVD. Abdominal: Normal appearance. Patient exhibits no distension. Abdomen is soft, obese, non tender. Musculoskeletal: Normal range of motion. Patient exhibits no edema. Neurological: Patient is alert and oriented to person, place, and time. Patient has normal strength. GCS eye subscore is 4. GCS verbal subscore is 5. GCS motor subscore is 6. Skin: Skin is warm and dry. No abrasion and no rash noted. Patient is not diaphoretic. No cyanosis or erythema. Psychiatric: Patient has a normal mood and affect. Speech is normal and behavior is normal. Cognition and memory are normal.       A/P    Juan Carlos Null is 31 y.o. female, Body mass index is 44.55 kg/m². pre surgery, has lost 3 lbs since last visit. The patient underwent extensive dietary counseling with registered dietician. I have reviewed, discussed and agree with the dietary plan. Patient is trying hard to keep good dietary and behavior modifications. Patient is monitoring portion sizes, food choices and liquid calories. Patient is trying to exercise regularly as much as possible. Obesity as a disease is considered a high risk to patients overall health and should therefore be considered a high risk disease state. Advised the patient that not getting there weight under control, that could increase risk of complications/worsening of those conditions on the long-term. (Goal of weight loss surgery is to alleviate/control some of those co-morbidities)    Now with Covid-19 pandemic, CDC and health authorities does classify obese patients as vulnerable and high risk as well. Which makes weight loss a priority for improvement of their wellbeing and overall health. I encouraged the patient to continue exercise and keeping healthy eating habits. Discussed pre-op labs and work up till now. Also counseled the patient extensively on Surgery. The visit today, included any number of the following: Bariatric Preoperative work up/protocols, review of labs, imaging, provider notes, outside hospital records, performing examination/evaluation, counseling patient and/or family, ordering medications/tests, placing referrals and communication with referring physicians, coordination of care; discussing dietary plan/recall with the patient as well with registered dietitian and documentation in the EHR. Of note, the above was done during same day of the actual patient encounter. Sarah Birch is here for her fifth presurgical visit overall doing well. Patient seeing hematology today and she is cleared her H&H is 12.5/30.7. She is can continue on oral iron supplements no further treatment or indicated at this point per their note. See the patient next month for continued follow-up.     We discussed how her excess weight affects her overall health and importance of weight loss, healthy diet and active lifestyle to alleviate those co morbid conditions, otherwise risk deterioration. Morbid Obesity: Body mass index is 44.55 kg/m². [x] Continue to make dietary and lifestyle modifications. [x] Plan for Future laparoscopic sleeve gastrectomy. [x] Return for follow-up next month. Chronic GERD:   [x] Continue to make dietary and lifestyle modifications. [x] Continue Omeprazole. Prediabetes:   [x] Continue to make dietary and lifestyle modifications. [x] Reviewed the importance of checking blood sugars. [x] Continue to follow up with their PCP for medication management and monitoring. Obstructive Sleep Apnea:   [x] Continue to make dietary and lifestyle modifications. [x] Reviewed the importance of wearing / compliance with CPAP/BIPAP. [x] Continue to follow up with their sleep medicine provider for CPAP/BIPAP management and monitoring. Patient advised that its their responsibility to follow up for studies, referrals and/or labs ordered today.

## 2022-03-03 NOTE — PROGRESS NOTES
Marie Her lost 3 lbs over 1 mo. Pt reports being consistent with gym for last 3 weeks, avoiding fast food and cooking at home, increasing protein intake, and being consistent with eating 4x day. Is pt eating at least 4 times everyday? Yes -    B - fruit / croissant  L - large salad with chix / baked sweet potato  S - cheese + grapes or crackers  D - leftovers - spaghetti + salad + garlic bread / carlos with Banza pasta  S - sometimes - PB + crackers or apple / smoothie (banana, oatmeal, milk, honey)    Is pt eating a lean protein source with all meals and snacks? Most of time    Has pt decreased their portions using the plate method? Trying to be mindful, does tend to eat more when stressed - trying to tune into hunger / fullness cues    Is pt choosing low fat/sugar free options? Trying to avoid sweets    Is pt drinking at least 64 oz of clear liquids everyday? Yes - water / decaf tea    Has pt stopped drinking carbonation, caffeinated, and sugar sweetened beverages? Eliminated    Has pt sampled Unjury and/or Nectar protein? Yes - tried and tolerated    Has pt attended a support group?  Completed    Participating in intentional exercise? gym 5x week - [employer] - goal of moving body more    Plan/Recommendations:   - Protein with breakfast - options reviewed  - Consistent with evening snack of protein + produce  - Continue with exercise regimen    Handouts: none    Rashida Colon RD, LD

## 2022-03-10 ENCOUNTER — TELEPHONE (OUTPATIENT)
Dept: PRIMARY CARE CLINIC | Age: 27
End: 2022-03-10

## 2022-03-10 RX ORDER — DOXYCYCLINE HYCLATE 100 MG
100 TABLET ORAL 2 TIMES DAILY
Qty: 20 TABLET | Refills: 0 | Status: SHIPPED | OUTPATIENT
Start: 2022-03-10 | End: 2022-03-20

## 2022-03-10 NOTE — TELEPHONE ENCOUNTER
----- Message from Trent Griffin sent at 3/10/2022 10:41 AM EST -----  Subject: Message to Provider    QUESTIONS  Information for Provider? Patient request call back. would like a new   prescription sent into pharmacy for skin condition  ---------------------------------------------------------------------------  --------------  CALL BACK INFO  What is the best way for the office to contact you? OK to leave message on   voicemail  Preferred Call Back Phone Number? [phone]  ---------------------------------------------------------------------------  --------------  SCRIPT ANSWERS  Relationship to Patient?  Self

## 2022-03-10 NOTE — TELEPHONE ENCOUNTER
----- Message from Regina Cuellar sent at 3/10/2022 10:41 AM EST -----  Subject: Message to Provider    QUESTIONS  Information for Provider? Patient request call back. would like a new   prescription sent into pharmacy for skin condition  ---------------------------------------------------------------------------  --------------  CALL BACK INFO  What is the best way for the office to contact you? OK to leave message on   voicemail  Preferred Call Back Phone Number? [phone]  ---------------------------------------------------------------------------  --------------  SCRIPT ANSWERS  Relationship to Patient?  Self

## 2022-03-15 NOTE — TELEPHONE ENCOUNTER
Left patient a message asking what medication she is requesting.  Did she have a reaction, is it not working

## 2022-04-04 DIAGNOSIS — K21.9 GASTROESOPHAGEAL REFLUX DISEASE WITHOUT ESOPHAGITIS: Primary | ICD-10-CM

## 2022-04-13 PROBLEM — I49.3 PVC (PREMATURE VENTRICULAR CONTRACTION): Status: ACTIVE | Noted: 2022-02-02

## 2022-04-26 ENCOUNTER — OFFICE VISIT (OUTPATIENT)
Dept: BARIATRICS/WEIGHT MGMT | Age: 27
End: 2022-04-26
Payer: COMMERCIAL

## 2022-04-26 ENCOUNTER — HOSPITAL ENCOUNTER (OUTPATIENT)
Age: 27
Discharge: HOME | End: 2022-04-26
Payer: COMMERCIAL

## 2022-04-26 VITALS
WEIGHT: 293 LBS | DIASTOLIC BLOOD PRESSURE: 84 MMHG | SYSTOLIC BLOOD PRESSURE: 128 MMHG | HEIGHT: 68 IN | RESPIRATION RATE: 18 BRPM | HEART RATE: 113 BPM | OXYGEN SATURATION: 98 % | BODY MASS INDEX: 44.41 KG/M2

## 2022-04-26 DIAGNOSIS — R03.0 ELEVATED BLOOD PRESSURE READING: ICD-10-CM

## 2022-04-26 DIAGNOSIS — G47.33 OBSTRUCTIVE SLEEP APNEA: ICD-10-CM

## 2022-04-26 DIAGNOSIS — E66.01 MORBID OBESITY WITH BMI OF 45.0-49.9, ADULT (HCC): Primary | ICD-10-CM

## 2022-04-26 DIAGNOSIS — K21.9 CHRONIC GERD: ICD-10-CM

## 2022-04-26 DIAGNOSIS — E66.01 MORBID OBESITY WITH BMI OF 45.0-49.9, ADULT (HCC): ICD-10-CM

## 2022-04-26 LAB
AMPHETAMINE SCREEN, URINE: NORMAL
BARBITURATE SCREEN URINE: NORMAL
BENZODIAZEPINE SCREEN, URINE: NORMAL
CANNABINOID SCREEN URINE: NORMAL
COCAINE METABOLITE SCREEN URINE: NORMAL
ETHANOL: NORMAL MG/DL (ref 0–0.08)
HCG(URINE) PREGNANCY TEST: NEGATIVE
Lab: NORMAL
METHADONE SCREEN, URINE: NORMAL
OPIATE SCREEN URINE: NORMAL
OXYCODONE URINE: NORMAL
PH UA: 6
PHENCYCLIDINE SCREEN URINE: NORMAL
PROPOXYPHENE SCREEN: NORMAL

## 2022-04-26 PROCEDURE — 1036F TOBACCO NON-USER: CPT | Performed by: SURGERY

## 2022-04-26 PROCEDURE — G8417 CALC BMI ABV UP PARAM F/U: HCPCS | Performed by: SURGERY

## 2022-04-26 PROCEDURE — 36415 COLL VENOUS BLD VENIPUNCTURE: CPT

## 2022-04-26 PROCEDURE — 99214 OFFICE O/P EST MOD 30 MIN: CPT | Performed by: SURGERY

## 2022-04-26 PROCEDURE — 84703 CHORIONIC GONADOTROPIN ASSAY: CPT

## 2022-04-26 PROCEDURE — 82077 ASSAY SPEC XCP UR&BREATH IA: CPT

## 2022-04-26 PROCEDURE — G8427 DOCREV CUR MEDS BY ELIG CLIN: HCPCS | Performed by: SURGERY

## 2022-04-26 PROCEDURE — G0480 DRUG TEST DEF 1-7 CLASSES: HCPCS

## 2022-04-26 ASSESSMENT — ENCOUNTER SYMPTOMS
RESPIRATORY NEGATIVE: 1
SHORTNESS OF BREATH: 0
EYES NEGATIVE: 1
GASTROINTESTINAL NEGATIVE: 1
COUGH: 0
ALLERGIC/IMMUNOLOGIC NEGATIVE: 1

## 2022-04-26 NOTE — PROGRESS NOTES
[facility]) Physicians   [facility] [employer]  Hilary Chavez MD, [facility], [address] [place] [address]    [place]  [id number] . Phone: [phone]  Fax: [phone]        HPI:     Chapo Montero is a very pleasant 31 y.o. female with Body mass index is 45.16 kg/m². , Pre-Surgery for future weight loss. Pre-operative clearance and work up done. Working hard to keep good dietary habits as well level of activity. Patient denies any nausea, vomiting, fevers, chills, shortness of breath, chest pain, cough, constipation or difficulty urinating. Pain Assessment   Denies any abdominal pain       Past Medical History:   Diagnosis Date    Allergic rhinitis     Anxiety     Asthma     Chronic GERD 11/30/2021    Hidradenitis     History of prediabetes 2012    Iron deficiency anemia     Menorrhagia     Migraine     Obesity     Obstructive sleep apnea     Pre-operative clearance 10/27/2020    Prediabetes 2/17/2020     Past Surgical History:   Procedure Laterality Date    CHOLECYSTECTOMY      TONSILLECTOMY  1998     Family History   Problem Relation Age of Onset    Other Mother         uterine fibroids    Diabetes Mother     Thyroid Disease Mother     Elevated Lipids Mother     Diabetes Father     Hypertension Father     Stroke Father 64    Sleep Apnea Father     Diabetes Other     Hypertension Other     Other Other         kidney disease (paternal uncle)    Other Maternal Grandmother         diverticulitis    Diabetes Maternal Grandmother     Other Paternal Grandmother         bone cancer     Heart Attack Paternal Grandmother     Colon Cancer Paternal Grandfather      Social History     Tobacco Use    Smoking status: Never Smoker    Smokeless tobacco: Never Used   Substance Use Topics    Alcohol use:  Yes     Alcohol/week: 1.0 standard drink     Types: 1 Glasses of wine per week     Comment: belongs to a wine-club-  (once a month )      I counseled the patient on the importance of not smoking and risks of ETOH. Allergies   Allergen Reactions    Seasonal      Vitals:    04/26/22 1145   BP: 128/84   Pulse: 113   Resp: 18   SpO2: 98%   Weight: 297 lb (134.7 kg)   Height: 5' 8\" (1.727 m)       Body mass index is 45.16 kg/m². Current Outpatient Medications:     venlafaxine (EFFEXOR XR) 37.5 MG extended release capsule, TAKE 1 CAPSULE BY MOUTH EVERY DAY, Disp: 60 capsule, Rfl: 3    ferrous sulfate (IRON 325) 325 (65 Fe) MG tablet, Take 1 tablet by mouth 2 times daily (with meals), Disp: 60 tablet, Rfl: 3    Benzoyl Peroxide (BENZAC AC) 10 % external wash, Apply topically 2 times daily. , Disp: 187 g, Rfl: 3    cetirizine (ZYRTEC) 10 MG tablet, TAKE 1 TABLET BY MOUTH EVERY DAY, Disp: 30 tablet, Rfl: 4    montelukast (SINGULAIR) 10 MG tablet, TAKE 1 TABLET BY MOUTH EVERY DAY AT NIGHT, Disp: 90 tablet, Rfl: 1    albuterol sulfate HFA (VENTOLIN HFA) 108 (90 Base) MCG/ACT inhaler, Inhale 2 puffs into the lungs every 6 hours as needed for Wheezing, Disp: 1 Inhaler, Rfl: 3    metFORMIN (GLUCOPHAGE-XR) 500 MG extended release tablet, Take 1 tablet by mouth daily (with breakfast) (Patient taking differently: Take 500 mg by mouth 2 times daily ), Disp: 90 tablet, Rfl: 1    fluticasone (FLONASE) 50 MCG/ACT nasal spray, SHAKE LIQUID AND USE 1 SPRAY IN EACH NOSTRIL DAILY, Disp: 1 Bottle, Rfl: 2    vitamin D (CHOLECALCIFEROL) 67845 UNIT CAPS, Take 1 capsule by mouth once a week, Disp: 12 capsule, Rfl: 0      Review of Systems - History obtained from the patient  General ROS: negative  Psychological ROS: negative  Ophthalmic ROS: negative  Neurological ROS: negative  ENT ROS: negative  Allergy and Immunology ROS: negative  Hematological and Lymphatic ROS: negative  Endocrine ROS: negative  Breast ROS: negative  Respiratory ROS: negative  Cardiovascular ROS: negative  Gastrointestinal ROS:negative  Genito-Urinary ROS: negative  Musculoskeletal ROS: negative   Skin ROS: much as possible. We discussed how her weight affects her overall health including:  Patient Active Problem List   Diagnosis    Asthma    Iron deficiency anemia    Lichen simplex    Menorrhagia with regular cycle    Generalized anxiety disorder    Seasonal allergic rhinitis due to pollen    Hidradenitis    Menorrhagia    Prediabetes    Obstructive sleep apnea    Morbid obesity with BMI of 45.0-49.9, adult (HCC)    Postprandial abdominal bloating    Chronic GERD    Elevated blood pressure reading    PVC (premature ventricular contraction)    and importance of weight loss to alleviate those co morbid conditions. I encouraged the patient to continue exercise and keeping healthy eating habits. Discussed pre-op labs and work up till now. Also counseled the patient extensively on Surgery. Total encounter time: 30 minutes, including any number of the following: Bariatric Pre/Post operative work up/protocols, review of labs, imaging, provider notes, outside hospital records, performing examination/evaluation, counseling patient and/or family, ordering medications/tests, placing referrals and communication with referring physicians, coordination of care; discussing dietary plan/recall with the patient as well with registered dietitian and documentation in the EHR. Of note, the above was done during same day of the actual patient encounter. Cathy Tipton is a 31 y.o. female with Body mass index is 45.16 kg/m². ,  patient is deemed appropriate candidate for weight loss surgery by our multidisciplinary team.       Following The Metabolic and Bariatric Surgery Accreditation and Quality Improvement Program [facility]), and [employer] (ACS) recommendations, the Bariatric Surgical Risk/Benefit Calculator was used for Cathy Tipton. Report was discussed with Justice Riser and patient wishes to proceed with surgery, fully understanding the risks and benefits.     Of note, The MBSAQIP nausea, vomiting, or reflux, injury to surrounding structures, risks of anesthesia, stricture, delayed gastric emptying, staple line leak, incisional hernia, malnutrition , hair loss, and/ or Conversion to Open surgery may be necessary. Failure to lose or maintain weight loss, Gallstones or Kidney Stones, Deep Venous Thrombosis , pulmonary embolism and / or death. With obesity and/or Fatty liver , I may elect to perform liver core biopsy to have  Baseline idea on liver pathology if there is abnormal Liver Functions or if there is hepatomegaly &/or lesion, risks include but not limited to bile leak, liver hematoma or failure to diagnose a condition. I did explain thoroughly to the patient that compliance with pre- and post op diet and other recommendations are integral part to improve the chances of successful weight loss and also not following it could end with serious health complications. We discussed that our goal is to ameliorate the medical problems and not to obtain a specific body mass index. Patient understands the risks and benefits and wishes to proceed with the procedure. Also understands if BMI is lower than 40 without significant co morbid conditions, concerns for risks of surgery being somewhat higher over long run, however patient wants to proceed and fully understands the risks. Clearly BMI over 35 does impose very serious health risks as well chances of losing weight on diet only is very limited and sustaining weight loss is even less, thus surgery is certainly recommended for long term weight loss and better health overall given compliance. Obesity as a disease is considered a high risk to patients overall health and should therefore be considered a high risk disease state. Now with Covid-19 pandemic, CDC and health authorities does classify obese patients as vulnerable and high risk as well.   Which makes weight loss a priority for improvement of their wellbeing and overall health. I advised the patient that we can't guarantee final insurance approval.        I advised the patient that sometimes other indicated procedures may arise and the decision will be based on my assessment intraoperatively. Some may include include but not limited to:  [Ventral Hernia, Risks include but not limited to; infection, bleeding, injury to organs or nerves or vessels, PE, DVT, cardiac events, , persistent pain or symptoms, abscess, hernia recurrence or need for further procedures. However that will be determined intra-operatively, if not safe to proceed , then any additional procedures will have to be addressed later as primary goal is bariatric surgery.]      [ Hiatal Hernia, will attempt repair,  risks and benefits including but not limited to; hemothorax, pneumothorax, recurrence, difficulty swallowing, persistent symptoms, reflux and need for medications, esophageal, splenic, lung, heart, bowel, vagus nerve or gastric injuries. However that will be determined intra-operatively, if not safe to proceed, then any additional procedures will have to be addressed later as primary goal is bariatric surgery.]     Emmanuel Campos understands that there may be a need to perform other urgent or necessary procedures that were unanticipated. Chrystal Lyons consent to the performance of any additional procedures determined during the original procedure to be in their best interests and where delay might cause additional harm or put patient at risk for additional anesthesia risk for required by a second procedure and that will be determined by the surgeon. Chrystal Lyons is aware if Weight gain occurs in pre-op period while on pre-operative diet or  non compliant with it , surgery will be canceled. Emmanuel Campos understand that in relation to the COVID-19 pandemic and surgical procedures, patient have been given the opportunity to postpone   surgery until a  later date.  Chrystal Lyons have chosen to proceed with surgery knowing the risks associated with COVID-19. Anna Mandujano was satisfied with the discussion we had and Anna Mandujano had no further questions at end of visit and wants to proceed with surgery. 1- Pre-operative work up ordered for you(labs/x-rays/EKG). 2- Stop taking Blood thinners,one week before surgery. 3- F/U with PCP for pre-op Medical Clearance. 4- Plan for Laparoscopic Sleeve, possible other indicated procedures. Patient advised that its their responsibility to follow up for studies, referrals and/or labs ordered today. Please note that some or all of this report was generated using voice recognition software. Please notify me in case of any questions about the content of this document, as some errors in transcription may have occurred .

## 2022-04-26 NOTE — PROGRESS NOTES
Chrystal Lyons gained 4 lbs over past 7 weeks. Pt has been reducing fast food intake, avoiding sugary drinks. Went on vacation in [place] and had more pastries as she was trying to be respectful of their culture. Is pt eating at least 4 times everyday? Yes     Is pt eating a lean protein source with all meals and snacks? Yes    B - *struggles here but does drink protein shake OR an egg   L - leftovers OR salad with chicken   S - portioned out nuts OR apple with PB   D - chicken breast/shrimp/steak/tuna with green beans/roasted carrots/onions/peppers     Has pt decreased their portions using the plate method? Generally good but were larger on vacation     Is pt choosing low fat/sugar free options? Had some higher CHO options when on vacation     Is pt drinking at least 64 oz of clear liquids everyday? Yes    Has pt stopped drinking carbonation, caffeinated, and sugar sweetened beverages? Yes - Drinking water, herbal tea when on vacation     Has pt sampled Unjury and/or Nectar protein? Yes    Has pt attended a support group? Completed    Participating in intentional exercise?  Yes - gym 3 x week - [employer] / Hip Hop / Step Sculpt     Plan/Recommendations:   Continue with diet and exercise     Handouts: none     Tevin Gilmore, RD, LD

## 2022-04-26 NOTE — PROGRESS NOTES
[facility]) Physicians   [facility] [employer]    Subjective:      Patient ID: Bettylou Cushing is a 31 y.o. female    HPI    The patient is here for their bariatric surgery preoperative education group visit. She has made several attempts at weight loss in the past without success and now has been scheduled to have bariatric surgery on May 23, 2022 for future weight loss. She is working to change her dietary behaviors and lose weight to improve comorbid conditions such as prediabetes, obstructive sleep apnea and GERD. Bettylou Cushing is a very pleasant 31 y.o. female with Body mass index is 45.31 kg/m².     Past Medical History:   Diagnosis Date    Allergic rhinitis     Anxiety     Asthma     Chronic GERD 11/30/2021    Diabetes mellitus ([facility] [address]     Hidradenitis     History of prediabetes 2012    Iron deficiency anemia     Menorrhagia     Migraine     Obesity     Obstructive sleep apnea     cpap    Pre-operative clearance 10/27/2020    Prediabetes 2/17/2020     Past Surgical History:   Procedure Laterality Date    CHOLECYSTECTOMY      TONSILLECTOMY  1998     Family History   Problem Relation Age of Onset    Other Mother         uterine fibroids    Diabetes Mother     Thyroid Disease Mother     Elevated Lipids Mother     Diabetes Father     Hypertension Father     Stroke Father 64    Sleep Apnea Father     Diabetes Other     Hypertension Other     Other Other         kidney disease (paternal uncle)    Other Maternal Grandmother         diverticulitis    Diabetes Maternal Grandmother     Other Paternal Grandmother         bone cancer     Heart Attack Paternal Grandmother     Colon Cancer Paternal Grandfather      Social History     Tobacco Use    Smoking status: Never Smoker    Smokeless tobacco: Never Used   Substance Use Topics    Alcohol use: Not Currently     Alcohol/week: 1.0 standard drink     Types: 1 Glasses of wine per week     Comment: belongs to a wine-club-  (once a month )      I counseled the patient on the importance of not smoking and risks of ETOH. Allergies   Allergen Reactions    Seasonal      Vitals:    05/03/22 1257   Weight: 298 lb (135.2 kg)   Height: 5' 8\" (1.727 m)     Body mass index is 45.31 kg/m². Current Outpatient Medications:     venlafaxine (EFFEXOR XR) 37.5 MG extended release capsule, TAKE 1 CAPSULE BY MOUTH EVERY DAY (Patient taking differently: at bedtime TAKE 1 CAPSULE BY MOUTH EVERY DAY), Disp: 60 capsule, Rfl: 3    ferrous sulfate (IRON 325) 325 (65 Fe) MG tablet, Take 1 tablet by mouth 2 times daily (with meals), Disp: 60 tablet, Rfl: 3    vitamin D (CHOLECALCIFEROL) 84261 UNIT CAPS, Take 1 capsule by mouth once a week, Disp: 12 capsule, Rfl: 0    Benzoyl Peroxide (BENZAC AC) 10 % external wash, Apply topically 2 times daily. , Disp: 187 g, Rfl: 3    cetirizine (ZYRTEC) 10 MG tablet, TAKE 1 TABLET BY MOUTH EVERY DAY, Disp: 30 tablet, Rfl: 4    montelukast (SINGULAIR) 10 MG tablet, TAKE 1 TABLET BY MOUTH EVERY DAY AT NIGHT, Disp: 90 tablet, Rfl: 1    albuterol sulfate HFA (VENTOLIN HFA) 108 (90 Base) MCG/ACT inhaler, Inhale 2 puffs into the lungs every 6 hours as needed for Wheezing, Disp: 1 Inhaler, Rfl: 3    metFORMIN (GLUCOPHAGE-XR) 500 MG extended release tablet, Take 1 tablet by mouth daily (with breakfast) (Patient taking differently: Take 500 mg by mouth 2 times daily ), Disp: 90 tablet, Rfl: 1    fluticasone (FLONASE) 50 MCG/ACT nasal spray, SHAKE LIQUID AND USE 1 SPRAY IN EACH NOSTRIL DAILY, Disp: 1 Bottle, Rfl: 2    Lab Results   Component Value Date    WBC 5.5 12/15/2021    RBC 4.45 12/15/2021    RBC 4.87 02/14/2020    HGB 9.3 12/15/2021    HCT 29.5 12/15/2021    MCV 66.4 12/15/2021    MCH 20.8 12/15/2021    MCHC 31.3 12/15/2021    MPV 7.9 12/15/2021    NEUTOPHILPCT 51.7 12/15/2021    LYMPHOPCT 39.7 12/15/2021    LYMPHOPCT 31.9 02/14/2020    MONOPCT 6.7 12/15/2021    EOSRELPCT 1.7 12/15/2021    BASOPCT 0.2 12/15/2021 NEUTROABS 2.8 12/15/2021    LYMPHSABS 2.2 12/15/2021    MONOSABS 0.4 12/15/2021    EOSABS 0.1 12/15/2021     Lab Results   Component Value Date     12/15/2021    K 4.1 12/15/2021    K 3.8 11/13/2020     12/15/2021    CO2 24 12/15/2021    ANIONGAP 10 12/15/2021    GLUCOSE 96 12/15/2021    BUN 7 12/15/2021    CREATININE 0.5 12/15/2021    LABGLOM >60 12/15/2021    GFRAA >60 12/15/2021    CALCIUM 9.1 12/15/2021    PROT 7.8 12/15/2021    LABALBU 4.2 12/15/2021    AGRATIO 1.2 12/15/2021    BILITOT 0.8 12/15/2021    ALKPHOS 68 12/15/2021    ALT 17 12/15/2021    AST 18 12/15/2021    GLOB 3.4 07/29/2021     Lab Results   Component Value Date    CHOL 162 12/15/2021    TRIG 92 12/15/2021    HDL 45 12/15/2021    LDLCALC 99 12/15/2021    LABVLDL 18 12/15/2021     Lab Results   Component Value Date    TSHREFLEX 1.06 12/15/2021     Lab Results   Component Value Date    IRON 25 12/15/2021    TIBC 382 12/15/2021    LABIRON 7 12/15/2021     Lab Results   Component Value Date    RLIWWKTG54 557 12/15/2021    FOLATE 15.09 12/15/2021     Lab Results   Component Value Date    VITD25 6.4 12/15/2021     Lab Results   Component Value Date    LABA1C 6.3 12/15/2021    .1 12/15/2021       Review of Systems   Constitutional: Negative. Negative for chills, fatigue and fever. HENT: Negative. Eyes: Negative. Respiratory: Negative. Negative for cough and shortness of breath. Cardiovascular: Negative. Gastrointestinal: Negative. Endocrine: Negative. Genitourinary: Negative. Musculoskeletal: Negative. Skin: Negative. Allergic/Immunologic: Negative. Neurological: Negative. Hematological: Negative. Psychiatric/Behavioral: Negative. Objective:     Physical Exam  Vitals reviewed. Constitutional:       Appearance: She is well-developed. HENT:      Head: Normocephalic and atraumatic.    Eyes:      Conjunctiva/sclera: Conjunctivae normal.      Pupils: Pupils are equal, round, and reactive to light.   Pulmonary:      Effort: Pulmonary effort is normal.   Abdominal:      Palpations: Abdomen is soft. Musculoskeletal:         General: Normal range of motion. Cervical back: Normal range of motion and neck supple. Skin:     General: Skin is warm and dry. Neurological:      Mental Status: She is alert and oriented to person, place, and time. Psychiatric:         Behavior: Behavior normal.         Thought Content: Thought content normal.         Judgment: Judgment normal.       Assessment and Plan:   Patient is here for their preoperative education group visit for sleeve gastrectomy. The patient is up 1 lbs today. The patient's current Body mass index is 45.31 kg/m². (5/3/22). She is making good dietary and behavior modifications and is considered to be a good surgical candidate. Patient has a diagnosis of prediabetes. Reviewed the importance of complying with post op diet. Patient has a diagnosis of obstructive sleep apnea and uses a CPAP for sleep. Discussed that weight loss can assist with improving sleep apnea symptoms. Explained to patient to make sure they bring their CPAP with them to the hospital for their surgery. Patient has a diagnosis of chronic GERD. Discussed the benefits of weight loss and dietary changes on acid reflux. If they are currently not taking a PPI/H2 Blocker then we will start one for a short term basis as they work through the phases of the postoperative diet. Discussed the fact that they will be required to crush or open their medications for the first two weeks after surgery and reviewed those medications that can not be crushed. Patient received instruction that it is recommended to avoid pregnancy following bariatric surgery for at least 2 years to allow them to have stable weight loss and to help avoid increased risk of vitamin deficiencies and malnutrition.  The patient was encouraged to discuss possible contraceptive methods with their PCP or OBGYN. Patient received instructions from the registered dietitian in reference to the two week preoperative diet and the four phases of their postoperative diet. In addition I reviewed these instructions and stressed the importance of following these recommendations for their safety. Patient completed the preoperative class where they were provided with education related to their bariatric surgery, common surgical complications, medications preoperatively & postoperatively, special concerns related to bariatric surgery postoperatively, vitamin supplementation, patient agreement, PAT & scheduling, hospital course, wellness discovery program and what to do in the case of an emergency postoperatively. The dietitians reviewed all preoperative and postoperative diet instructions. Patient was given the opportunity to ask questions during the group visit and these questions were answered by myself and/or the dietitian. I spent a total of 45 minutes on the day of the visit and over half of that time was spent counseling the patient on proper dietary behaviors, exercise and surgery protocols.

## 2022-04-28 NOTE — PROGRESS NOTES
Name_______________________________________Printed:____________________  Date and time of surgery__5/23 0900______________________Arrival Time:__0700______________   1. The instructions given regarding when and if a patient needs to stop oral intake prior to surgery varies. Follow the specific instructions you were given                  __x_Nothing to eat or to drink after Midnight the night before.                   ____Carbo loading or ERAS instructions will be given to select patients-if you have been given those instructions -please do the following                           The evening before your surgery after dinner before midnight drink 40 ounces of gatorade. If you are diabetic use sugar free. The morning of surgery drink 40 ounces of water. This needs to be finished 3 hours prior to your surgery start time. 2. Take the following pills with a small sip of water on the morning of surgery_ inhalers   Takes meds at night________________________________________________                  Do not take blood pressure medications ending in pril or sartan the yosef prior to surgery or the morning of surgery_   3. Aspirin, Ibuprofen, Advil, Naproxen, Vitamin E and other Anti-inflammatory products and supplements should be stopped for 5 -7days before surgery or as directed by your physician. 4. Check with your Doctor regarding stopping Plavix, Coumadin,Eliquis, Lovenox,Effient,Pradaxa,Xarelto, Fragmin or other blood thinners and follow their instructions. 5. Do not smoke, and do not drink any alcoholic beverages 24 hours prior to surgery. This includes NA Beer. Refrain from the usage of any recreational drugs. 6. You may brush your teeth and gargle the morning of surgery. DO NOT SWALLOW WATER   7. You MUST make arrangements for a responsible adult to stay on site while you are here and take you home after your surgery. You will not be allowed to leave alone or drive yourself home.   It is strongly suggested someone stay with you the first 24 hrs. Your surgery will be cancelled if you do not have a ride home. 8. A parent/legal guardian must accompany a child scheduled for surgery and plan to stay at the hospital until the child is discharged. Please do not bring other children with you. 9. Please wear simple, loose fitting clothing to the hospital.  Luís Bajwa not bring valuables (money, credit cards, checkbooks, etc.) Do not wear any makeup (including no eye makeup) or nail polish on your fingers or toes. 10. DO NOT wear any jewelry or piercings on day of surgery. All body piercing jewelry must be removed. 11. If you have ___dentures, they will be removed before going to the OR; we will provide you a container. If you wear ___contact lenses or ___glasses, they will be removed; please bring a case for them. 12. Please see your family doctor/pediatrician for a history & physical and/or concerning medications. Bring any test results/reports from your physician's office. PCP__________________Phone___________H&P Appt. Date________             13 If you  have a Living Will and Durable Power of  for Healthcare, please bring in a copy. 15. Notify your Surgeon if you develop any illness between now and surgery  time, cough, cold, fever, sore throat, nausea, vomiting, etc.  Please notify your surgeon if you experience dizziness, shortness of breath or blurred vision between now & the time of your surgery             15. DO NOT shave your operative site 96 hours prior to surgery. For face & neck surgery, men may use an electric razor 48 hours prior to surgery. 16. Shower the night before or morning of surgery using an antibacterial soap or as you have been instructed. 17. To provide excellent care visitors will be limited to one in the room at any given time. 18.  Please bring picture ID and insurance card.              19.  Visit our web site for additional information:  [email or web address]              20.During flu season no children under the age of 15 are permitted in the hospital for the safety of all patients. 21. If you take a long acting insulin in the evening only  take half of your usual  dose the night  before your procedure              22. If you use a c-pap please bring DOS if staying overnight,             23.For your convenience [facility] has a pharmacy on site to fill your prescriptions. 24. If you use oxygen and have a portable tank please bring it  with you the DOS             25. Bring a complete list of all your medications with name and dose include any supplements. 26. Other__________________________________________   *Please call pre admission testing if you any further questions   [place] [phone]    [place] [phone]. [place]  [phone]   [address] [place] [address]       VISITOR POLICY(subject to change)    Current policy is 2 visitors per patient. No children. A mask is required. Visiting hours are 8a-8p. Overnight visitors will be at the discretion of the nurse. All above information reviewed with patient in person or by phone. Patient verbalizes understanding. All questions and concerns addressed.                                                                                                  Patient/Rep____________________                                                                                                               Per phone/pt                     PRE OP INSTRUCTIONS

## 2022-04-28 NOTE — PROGRESS NOTES
Patient Name:   Naomi Dunn      Type of Surgery:  Sleeve         Date of Surgery:  5/23/22       Start Pre-Op Diet On:  5/10/22       Start Clear Liquids On:  5/22/22         NPO after midnight the night before your surgery! Take morning medications with a small amount of water.     NOTES:_______________________________________  ______________________________________________

## 2022-04-29 NOTE — PROGRESS NOTES
Patient reached __X__ yes  _____ no   VM instructions left ____ yes   phone number ________                                ____ no-office notified          Date _5/6/22________  Time _1030______  Arrival __0830  hosp-endo____    Nothing to eat or drink after midnight-follow your doctors prep instructions-this may include taking a second dose of your prep after midnight  Responsible adult 25 or older to stay on site while you are here-drive you home-stay with you after  Follow any instructions your doctors office has given you  Bring a complete list of all your medications and supplements including name,dose,how often taken the day of your procedure  If you normally take the following medications in the morning please do so the AM of your procedure with a small sip of water       Heart,blood pressure,seizure,thyroid or breathing medications-use your inhalers       DO NOT take blood pressure medications ending in \"dennys\" or \"pril\" the AM of procedure or evening prior  Take half or your normal dose of any long acting insulins the night before your procedure-do not take any diabetic medications the AM of procedure  Follow your doctors instructions regarding stopping or taking  any blood thinners-if you do not have instructions-call them  Any questions call your doctor  Other ______________________________________________________________      Odalis Carnes POLICY(subject to change)             The current policy is 2 visitors per patient. There are no children allowed. Everyone must mask. Visiting hours are 8a-8p. Overnight visitors will be at the discretion of the nurse.

## 2022-04-30 LAB
3-OH-COTININE: <2 NG/ML
COTININE: <2 NG/ML
NICOTINE: <2 NG/ML

## 2022-05-03 ENCOUNTER — OFFICE VISIT (OUTPATIENT)
Dept: BARIATRICS/WEIGHT MGMT | Age: 27
End: 2022-05-03
Payer: COMMERCIAL

## 2022-05-03 ENCOUNTER — HOSPITAL ENCOUNTER (OUTPATIENT)
Age: 27
Discharge: HOME | End: 2022-05-03
Payer: COMMERCIAL

## 2022-05-03 VITALS — BODY MASS INDEX: 44.41 KG/M2 | WEIGHT: 293 LBS | HEIGHT: 68 IN

## 2022-05-03 DIAGNOSIS — K21.9 CHRONIC GERD: ICD-10-CM

## 2022-05-03 DIAGNOSIS — R73.03 PREDIABETES: Primary | ICD-10-CM

## 2022-05-03 DIAGNOSIS — G47.33 OBSTRUCTIVE SLEEP APNEA: ICD-10-CM

## 2022-05-03 DIAGNOSIS — E66.01 MORBID OBESITY WITH BMI OF 45.0-49.9, ADULT (HCC): ICD-10-CM

## 2022-05-03 LAB
A/G RATIO: 1.3 (ref 1.1–2.2)
ABO/RH: NORMAL
ALBUMIN SERPL-MCNC: 4.3 G/DL (ref 3.4–5)
ALP BLD-CCNC: 65 U/L (ref 40–129)
ALT SERPL-CCNC: 18 U/L (ref 10–40)
ANION GAP SERPL CALCULATED.3IONS-SCNC: 14 MMOL/L (ref 3–16)
ANTIBODY SCREEN: NORMAL
AST SERPL-CCNC: 15 U/L (ref 15–37)
BILIRUB SERPL-MCNC: 0.9 MG/DL (ref 0–1)
BUN BLDV-MCNC: 9 MG/DL (ref 7–20)
CALCIUM SERPL-MCNC: 9.2 MG/DL (ref 8.3–10.6)
CHLORIDE BLD-SCNC: 102 MMOL/L (ref 99–110)
CO2: 21 MMOL/L (ref 21–32)
CREAT SERPL-MCNC: 0.7 MG/DL (ref 0.6–1.1)
GFR AFRICAN AMERICAN: >60
GFR NON-AFRICAN AMERICAN: >60
GLUCOSE BLD-MCNC: 84 MG/DL (ref 70–99)
HCT VFR BLD CALC: 38.7 % (ref 36–48)
HEMOGLOBIN: 12.7 G/DL (ref 12–16)
MCH RBC QN AUTO: 26.2 PG (ref 26–34)
MCHC RBC AUTO-ENTMCNC: 32.8 G/DL (ref 31–36)
MCV RBC AUTO: 79.6 FL (ref 80–100)
PDW BLD-RTO: 14.1 % (ref 12.4–15.4)
PLATELET # BLD: 263 K/UL (ref 135–450)
PMV BLD AUTO: 8.1 FL (ref 5–10.5)
POTASSIUM SERPL-SCNC: 4.5 MMOL/L (ref 3.5–5.1)
RBC # BLD: 4.86 M/UL (ref 4–5.2)
SODIUM BLD-SCNC: 137 MMOL/L (ref 136–145)
TOTAL PROTEIN: 7.5 G/DL (ref 6.4–8.2)
WBC # BLD: 6.9 K/UL (ref 4–11)

## 2022-05-03 PROCEDURE — 86850 RBC ANTIBODY SCREEN: CPT

## 2022-05-03 PROCEDURE — 86900 BLOOD TYPING SEROLOGIC ABO: CPT

## 2022-05-03 PROCEDURE — G8417 CALC BMI ABV UP PARAM F/U: HCPCS | Performed by: NURSE PRACTITIONER

## 2022-05-03 PROCEDURE — 1036F TOBACCO NON-USER: CPT | Performed by: NURSE PRACTITIONER

## 2022-05-03 PROCEDURE — 86901 BLOOD TYPING SEROLOGIC RH(D): CPT

## 2022-05-03 PROCEDURE — 99214 OFFICE O/P EST MOD 30 MIN: CPT | Performed by: NURSE PRACTITIONER

## 2022-05-03 PROCEDURE — 85027 COMPLETE CBC AUTOMATED: CPT

## 2022-05-03 PROCEDURE — G8427 DOCREV CUR MEDS BY ELIG CLIN: HCPCS | Performed by: NURSE PRACTITIONER

## 2022-05-03 PROCEDURE — 36415 COLL VENOUS BLD VENIPUNCTURE: CPT

## 2022-05-03 PROCEDURE — 80053 COMPREHEN METABOLIC PANEL: CPT

## 2022-05-06 ENCOUNTER — ANESTHESIA (OUTPATIENT)
Dept: ENDOSCOPY | Age: 27
End: 2022-05-06
Payer: COMMERCIAL

## 2022-05-06 ENCOUNTER — ANESTHESIA EVENT (OUTPATIENT)
Dept: ENDOSCOPY | Age: 27
End: 2022-05-06
Payer: COMMERCIAL

## 2022-05-06 ENCOUNTER — HOSPITAL ENCOUNTER (OUTPATIENT)
Age: 27
Setting detail: OUTPATIENT SURGERY
Discharge: HOME | End: 2022-05-06
Attending: SURGERY | Admitting: SURGERY
Payer: COMMERCIAL

## 2022-05-06 VITALS
OXYGEN SATURATION: 95 % | DIASTOLIC BLOOD PRESSURE: 64 MMHG | SYSTOLIC BLOOD PRESSURE: 148 MMHG | RESPIRATION RATE: 12 BRPM

## 2022-05-06 VITALS
RESPIRATION RATE: 22 BRPM | HEART RATE: 96 BPM | WEIGHT: 293 LBS | SYSTOLIC BLOOD PRESSURE: 117 MMHG | DIASTOLIC BLOOD PRESSURE: 79 MMHG | BODY MASS INDEX: 43.4 KG/M2 | HEIGHT: 69 IN | TEMPERATURE: 97.6 F | OXYGEN SATURATION: 99 %

## 2022-05-06 LAB
GLUCOSE BLD-MCNC: 105 MG/DL (ref 70–99)
GLUCOSE BLD-MCNC: 116 MG/DL (ref 70–99)
HCG(URINE) PREGNANCY TEST: NEGATIVE
PERFORMED ON: ABNORMAL
PERFORMED ON: ABNORMAL

## 2022-05-06 PROCEDURE — 84703 CHORIONIC GONADOTROPIN ASSAY: CPT

## 2022-05-06 PROCEDURE — 88305 TISSUE EXAM BY PATHOLOGIST: CPT

## 2022-05-06 PROCEDURE — 2500000003 HC RX 250 WO HCPCS: Performed by: NURSE ANESTHETIST, CERTIFIED REGISTERED

## 2022-05-06 PROCEDURE — 3700000000 HC ANESTHESIA ATTENDED CARE: Performed by: SURGERY

## 2022-05-06 PROCEDURE — 2580000003 HC RX 258: Performed by: SURGERY

## 2022-05-06 PROCEDURE — 7100000010 HC PHASE II RECOVERY - FIRST 15 MIN: Performed by: SURGERY

## 2022-05-06 PROCEDURE — 6360000002 HC RX W HCPCS: Performed by: NURSE ANESTHETIST, CERTIFIED REGISTERED

## 2022-05-06 PROCEDURE — 43239 EGD BIOPSY SINGLE/MULTIPLE: CPT | Performed by: SURGERY

## 2022-05-06 PROCEDURE — 3609012400 HC EGD TRANSORAL BIOPSY SINGLE/MULTIPLE: Performed by: SURGERY

## 2022-05-06 PROCEDURE — 7100000011 HC PHASE II RECOVERY - ADDTL 15 MIN: Performed by: SURGERY

## 2022-05-06 PROCEDURE — 2709999900 HC NON-CHARGEABLE SUPPLY: Performed by: SURGERY

## 2022-05-06 RX ORDER — SODIUM CHLORIDE 9 MG/ML
INJECTION, SOLUTION INTRAVENOUS CONTINUOUS
Status: DISCONTINUED | OUTPATIENT
Start: 2022-05-06 | End: 2022-05-06 | Stop reason: HOSPADM

## 2022-05-06 RX ORDER — PROPOFOL 10 MG/ML
INJECTION, EMULSION INTRAVENOUS PRN
Status: DISCONTINUED | OUTPATIENT
Start: 2022-05-06 | End: 2022-05-06 | Stop reason: SDUPTHER

## 2022-05-06 RX ORDER — OMEPRAZOLE 20 MG/1
20 CAPSULE, DELAYED RELEASE ORAL
Qty: 90 CAPSULE | Refills: 1 | Status: SHIPPED | OUTPATIENT
Start: 2022-05-06

## 2022-05-06 RX ORDER — LIDOCAINE HYDROCHLORIDE 20 MG/ML
INJECTION, SOLUTION EPIDURAL; INFILTRATION; INTRACAUDAL; PERINEURAL PRN
Status: DISCONTINUED | OUTPATIENT
Start: 2022-05-06 | End: 2022-05-06 | Stop reason: SDUPTHER

## 2022-05-06 RX ADMIN — PROPOFOL 50 MG: 10 INJECTION, EMULSION INTRAVENOUS at 10:34

## 2022-05-06 RX ADMIN — SODIUM CHLORIDE: 9 INJECTION, SOLUTION INTRAVENOUS at 09:21

## 2022-05-06 RX ADMIN — PROPOFOL 50 MG: 10 INJECTION, EMULSION INTRAVENOUS at 10:31

## 2022-05-06 RX ADMIN — PROPOFOL 50 MG: 10 INJECTION, EMULSION INTRAVENOUS at 10:33

## 2022-05-06 RX ADMIN — PROPOFOL 100 MG: 10 INJECTION, EMULSION INTRAVENOUS at 10:29

## 2022-05-06 RX ADMIN — LIDOCAINE HYDROCHLORIDE 50 MG: 20 INJECTION, SOLUTION EPIDURAL; INFILTRATION; INTRACAUDAL; PERINEURAL at 10:29

## 2022-05-06 RX ADMIN — PROPOFOL 50 MG: 10 INJECTION, EMULSION INTRAVENOUS at 10:35

## 2022-05-06 ASSESSMENT — PULMONARY FUNCTION TESTS
PIF_VALUE: 1

## 2022-05-06 ASSESSMENT — PAIN - FUNCTIONAL ASSESSMENT: PAIN_FUNCTIONAL_ASSESSMENT: 0-10

## 2022-05-06 NOTE — PROGRESS NOTES
Pt arrived from ENDO to PACU bay 7. Report received from ENDO staff. Pt arousable to voice. Pt on RA, NSR, and VSS. Will continue to monitor.

## 2022-05-06 NOTE — ANESTHESIA PRE PROCEDURE
Department of Anesthesiology  Preprocedure Note       Name:  Melba Purvis   Age:  31 y.o.  :  1995                                          MRN:  [id number]         Date:  2022      Surgeon: Rose Marie Gould): Benigno Kerr MD    Procedure: Procedure(s):  EGD DIAGNOSTIC ONLY    Medications prior to admission:   Prior to Admission medications    Medication Sig Start Date End Date Taking? Authorizing Provider   venlafaxine (EFFEXOR XR) 37.5 MG extended release capsule TAKE 1 CAPSULE BY MOUTH EVERY DAY  Patient taking differently: at bedtime TAKE 1 CAPSULE BY MOUTH EVERY DAY 3/1/22   CASPER Oates CNP   ferrous sulfate (IRON 325) 325 (65 Fe) MG tablet Take 1 tablet by mouth 2 times daily (with meals) 22   CASPER Oates CNP   vitamin D (CHOLECALCIFEROL) 03856 UNIT CAPS Take 1 capsule by mouth once a week 12/17/21 3/17/22  Benigno Kerr MD   Benzoyl Peroxide [facility]) 10 % external wash Apply topically 2 times daily.  21   CASPER Oates CNP   cetirizine (ZYRTEC) 10 MG tablet TAKE 1 TABLET BY MOUTH EVERY DAY 21   CASPER Oates CNP   montelukast (SINGULAIR) 10 MG tablet TAKE 1 TABLET BY MOUTH EVERY DAY AT NIGHT 21   CASPER Oates CNP   albuterol sulfate HFA (VENTOLIN HFA) 108 (90 Base) MCG/ACT inhaler Inhale 2 puffs into the lungs every 6 hours as needed for Wheezing 21   CASPER Oates CNP   metFORMIN (GLUCOPHAGE-XR) 500 MG extended release tablet Take 1 tablet by mouth daily (with breakfast)  Patient taking differently: Take 500 mg by mouth 2 times daily  20   Sumanth Hernandez MD   fluticasone (FLONASE) 50 MCG/ACT nasal spray SHAKE LIQUID AND USE 1 SPRAY IN EACH NOSTRIL DAILY 19   Leandra Menon MD       Current medications:    Current Facility-Administered Medications   Medication Dose Route Frequency Provider Last Rate Last Admin    0.9 % sodium chloride infusion   IntraVENous Continuous Tigist Lula Hurst MD           Allergies: Allergies   Allergen Reactions    Seasonal        Problem List:    Patient Active Problem List   Diagnosis Code    Asthma J45.909    Iron deficiency anemia Z34.2    Lichen simplex H04.8    Menorrhagia with regular cycle N92.0    Generalized anxiety disorder F41.1    Seasonal allergic rhinitis due to pollen J30.1    Hidradenitis L73.2    Menorrhagia N92.0    Prediabetes R73.03    Obstructive sleep apnea G47.33    Morbid obesity with BMI of 45.0-49.9, adult (HCC) E66.01, Z68.42    Postprandial abdominal bloating R14.0    Chronic GERD K21.9    Elevated blood pressure reading R03.0    PVC (premature ventricular contraction) I49.3       Past Medical History:        Diagnosis Date    Allergic rhinitis     Anxiety     Asthma     Chronic GERD 11/30/2021    Diabetes mellitus ([address]     Hidradenitis     History of prediabetes 2012    Iron deficiency anemia     Menorrhagia     Migraine     Obesity     Obstructive sleep apnea     cpap    Pre-operative clearance 10/27/2020    Prediabetes 2/17/2020       Past Surgical History:        Procedure Laterality Date    CHOLECYSTECTOMY      TONSILLECTOMY  1998       Social History:    Social History     Tobacco Use    Smoking status: Never Smoker    Smokeless tobacco: Never Used   Substance Use Topics    Alcohol use: Not Currently     Alcohol/week: 1.0 standard drink     Types: 1 Glasses of wine per week     Comment: belongs to a wine-club-  (once a month )                                 Counseling given: Not Answered      Vital Signs (Current): There were no vitals filed for this visit.                                            BP Readings from Last 3 Encounters:   04/26/22 128/84   03/03/22 125/66   02/07/22 105/77       NPO Status:  before mn                                                                                BMI:   Wt Readings from Last 3 Encounters:   05/03/22 298 lb (135.2 kg)   04/26/22 297 lb (134.7 kg)   03/03/22 293 lb (132.9 kg)     There is no height or weight on file to calculate BMI.    CBC:   Lab Results   Component Value Date    WBC 6.9 05/03/2022    RBC 4.86 05/03/2022    RBC 4.87 02/14/2020    HGB 12.7 05/03/2022    HCT 38.7 05/03/2022    MCV 79.6 05/03/2022    RDW 14.1 05/03/2022     05/03/2022       CMP:   Lab Results   Component Value Date     05/03/2022    K 4.5 05/03/2022    K 3.8 11/13/2020     05/03/2022    CO2 21 05/03/2022    BUN 9 05/03/2022    CREATININE 0.7 05/03/2022    GFRAA >60 05/03/2022    AGRATIO 1.3 05/03/2022    LABGLOM >60 05/03/2022    GLUCOSE 84 05/03/2022    PROT 7.5 05/03/2022    CALCIUM 9.2 05/03/2022    BILITOT 0.9 05/03/2022    ALKPHOS 65 05/03/2022    AST 15 05/03/2022    ALT 18 05/03/2022       POC Tests: No results for input(s): POCGLU, POCNA, POCK, POCCL, POCBUN, POCHEMO, POCHCT in the last 72 hours.     Coags:   Lab Results   Component Value Date    PROTIME 11.9 12/15/2021    INR 1.05 12/15/2021       HCG (If Applicable):   Lab Results   Component Value Date    PREGTESTUR Negative 04/26/2022        ABGs: No results found for: PHART, PO2ART, LYM1SLY, RCO5RPB, BEART, E3VPMDMX     Type & Screen (If Applicable):  No results found for: LABABO, LABRH    Drug/Infectious Status (If Applicable):  No results found for: HIV, HEPCAB    COVID-19 Screening (If Applicable):   Lab Results   Component Value Date    COVID19 Not Detected 11/16/2020    COVID19 NOT DETECTED 09/25/2020           Anesthesia Evaluation  Patient summary reviewed and Nursing notes reviewed  Airway: Mallampati: I  TM distance: >3 FB   Neck ROM: full  Mouth opening: > = 3 FB Dental: normal exam         Pulmonary:normal exam  breath sounds clear to auscultation  (+) sleep apnea: on CPAP,  asthma:                            Cardiovascular:Negative CV ROS  Exercise tolerance: good (>4 METS),           Rhythm: regular  Rate: normal                    Neuro/Psych:   (+) headaches:, psychiatric history:            GI/Hepatic/Renal:   (+) GERD:, morbid obesity          Endo/Other:    (+) Diabetes, . Abdominal:             Vascular: Other Findings:             Anesthesia Plan      MAC     ASA 3       Induction: intravenous. Anesthetic plan and risks discussed with patient. Plan discussed with CRNA.     Attending anesthesiologist reviewed and agrees with Tab Boggs MD   5/6/2022

## 2022-05-06 NOTE — H&P
Department of General Surgery - Adult [facility]   [employer]  Attending Pre-operative History and Physical      DIAGNOSIS:  Obesity    INDICATION:  Pre-op    PROCEDURE:  EGD    CHIEF COMPLAINT:  Obesity    History Obtained From:  patient    HISTORY OF PRESENT ILLNESS:    The patient is a 31 y.o. female with significant past medical history of   Patient Active Problem List   Diagnosis    Asthma    Iron deficiency anemia    Lichen simplex    Menorrhagia with regular cycle    Generalized anxiety disorder    Seasonal allergic rhinitis due to pollen    Hidradenitis    Menorrhagia    Prediabetes    Obstructive sleep apnea    Morbid obesity with BMI of 45.0-49.9, adult (HCC)    Postprandial abdominal bloating    Chronic GERD    Elevated blood pressure reading    PVC (premature ventricular contraction)      who presents for pre-op EGD    Past Medical History:        Diagnosis Date    Allergic rhinitis     Anxiety     Asthma     Chronic GERD 11/30/2021    Diabetes mellitus ([address]     Hidradenitis     History of prediabetes 2012    Iron deficiency anemia     Menorrhagia     Migraine     Obesity     Obstructive sleep apnea     cpap    Pre-operative clearance 10/27/2020    Prediabetes 2/17/2020     Past Surgical History:        Procedure Laterality Date    CHOLECYSTECTOMY      TONSILLECTOMY  1998     Medications Prior to Admission:   Medications Prior to Admission: venlafaxine (EFFEXOR XR) 37.5 MG extended release capsule, TAKE 1 CAPSULE BY MOUTH EVERY DAY (Patient taking differently: at bedtime TAKE 1 CAPSULE BY MOUTH EVERY DAY)  ferrous sulfate (IRON 325) 325 (65 Fe) MG tablet, Take 1 tablet by mouth 2 times daily (with meals)  vitamin D (CHOLECALCIFEROL) 55779 UNIT CAPS, Take 1 capsule by mouth once a week  Benzoyl Peroxide (BENZAC AC) 10 % external wash, Apply topically 2 times daily.   cetirizine (ZYRTEC) 10 MG tablet, TAKE 1 TABLET BY MOUTH EVERY DAY  montelukast (SINGULAIR) 10 MG tablet, TAKE 1 TABLET BY MOUTH EVERY DAY AT NIGHT  albuterol sulfate HFA (VENTOLIN HFA) 108 (90 Base) MCG/ACT inhaler, Inhale 2 puffs into the lungs every 6 hours as needed for Wheezing  metFORMIN (GLUCOPHAGE-XR) 500 MG extended release tablet, Take 1 tablet by mouth daily (with breakfast) (Patient taking differently: Take 500 mg by mouth 2 times daily )  fluticasone (FLONASE) 50 MCG/ACT nasal spray, SHAKE LIQUID AND USE 1 SPRAY IN EACH NOSTRIL DAILY    Allergies:  Seasonal    Social History:   TOBACCO:   reports that she has never smoked. She has never used smokeless tobacco.  ETOH:   reports previous alcohol use of about 1.0 standard drink of alcohol per week.   Family History:       Problem Relation Age of Onset    Other Mother         uterine fibroids    Diabetes Mother     Thyroid Disease Mother     Elevated Lipids Mother     Diabetes Father     Hypertension Father     Stroke Father 64    Sleep Apnea Father     Diabetes Other     Hypertension Other     Other Other         kidney disease (paternal uncle)    Other Maternal Grandmother         diverticulitis    Diabetes Maternal Grandmother     Other Paternal Grandmother         bone cancer     Heart Attack Paternal Grandmother     Colon Cancer Paternal Grandfather          REVIEW OF SYSTEMS:    Review of Systems - History obtained from the patient  General ROS: negative  Psychological ROS: negative  Ophthalmic ROS: negative  Neurological ROS: negative  ENT ROS: negative  Allergy and Immunology ROS: negative  Hematological and Lymphatic ROS: negative  Endocrine ROS: negative  Breast ROS: negative  Respiratory ROS: negative  Cardiovascular ROS: negative  Gastrointestinal ROS:negative  Genito-Urinary ROS: negative  Musculoskeletal ROS: negative   Skin ROS: negative      PHYSICAL EXAM:      /84   Pulse 103   Temp 97.4 °F (36.3 °C) (Temporal)   Resp 18   Ht 5' 9\" (1.753 m)   Wt 294 lb (133.4 kg)   LMP 04/11/2022   SpO2 100%   BMI 43.42 kg/m²  I      Physical Exam   Vitals Reviewed   Constitutional: Patient is oriented to person, place, and time. Patient appears well-developed and well-nourished. Patient is active and cooperative. Non-toxic appearance. No distress. HENT:   Head: Normocephalic and atraumatic. Head is without abrasion and without laceration. Hair is normal.   Right Ear: External ear normal. No lacerations. No drainage, swelling . Left Ear: External ear normal. No lacerations. No drainage, swelling. Nose: Nose normal. No nose lacerations or nasal deformity. Eyes: Conjunctivae, EOM and lids are normal. Right eye exhibits no discharge. No foreign body present in the right eye. Left eye exhibits no discharge. No foreign body present in the left eye. No scleral icterus. Neck: Trachea normal and normal range of motion. No JVD present. Pulmonary/Chest: Effort normal. No accessory muscle usage or stridor. No apnea. No respiratory distress. Cardiovascular: Normal rate and no JVD. Abdominal: Normal appearance. Patient exhibits no distension. Musculoskeletal: Normal range of motion. Patient exhibits no edema. Neurological: Patient is alert and oriented to person, place, and time. Patient has normal strength. GCS eye subscore is 4. GCS verbal subscore is 5. GCS motor subscore is 6. Skin: Skin is warm and dry. No abrasion and no rash noted. Patient is not diaphoretic. No cyanosis or erythema. Psychiatric: Patient has a normal mood and affect.  Speech is normal and behavior is normal. Cognition and memory are normal.       DATA:  CBC:   Lab Results   Component Value Date    WBC 6.9 05/03/2022    RBC 4.86 05/03/2022    RBC 4.87 02/14/2020    HGB 12.7 05/03/2022    HCT 38.7 05/03/2022    MCV 79.6 05/03/2022    MCH 26.2 05/03/2022    MCHC 32.8 05/03/2022    RDW 14.1 05/03/2022     05/03/2022    MPV 8.1 05/03/2022     CMP:    Lab Results   Component Value Date     05/03/2022 K 4.5 05/03/2022    K 3.8 11/13/2020     05/03/2022    CO2 21 05/03/2022    BUN 9 05/03/2022    CREATININE 0.7 05/03/2022    GFRAA >60 05/03/2022    AGRATIO 1.3 05/03/2022    LABGLOM >60 05/03/2022    GLUCOSE 84 05/03/2022    PROT 7.5 05/03/2022    LABALBU 4.3 05/03/2022    CALCIUM 9.2 05/03/2022    BILITOT 0.9 05/03/2022    ALKPHOS 65 05/03/2022    AST 15 05/03/2022    ALT 18 05/03/2022       ASSESSMENT AND PLAN:    1. Patient is a 31 y.o. female with above specified procedure planned EGD with deep sedation  2. Procedure options, risks and benefits reviewed with patient. Patient expresses understanding.

## 2022-05-06 NOTE — ANESTHESIA POSTPROCEDURE EVALUATION
Department of Anesthesiology  Postprocedure Note    Patient: Bhargavi Munroe  MRN: [id number]  YOB: 1995  Date of evaluation: 5/6/2022  Time:  10:52 AM     Procedure Summary     Date: 05/06/22 Room / Location: [address] [place] [address]    Anesthesia Start: 1025 Anesthesia Stop: 8508    Procedure: EGD BIOPSY (N/A Abdomen) Diagnosis: (Gastroesophageal reflux disease (GERD) K21.9)    Surgeons: Arnel Phelps MD Responsible Provider: Anna Moran MD    Anesthesia Type: MAC ASA Status: 3          Anesthesia Type: No value filed. Lisy Phase I: Lisy Score: 9    Lisy Phase II:      Last vitals: Reviewed and per EMR flowsheets.        Anesthesia Post Evaluation    Patient location during evaluation: PACU  Patient participation: complete - patient participated  Level of consciousness: awake  Airway patency: patent  Nausea & Vomiting: no nausea and no vomiting  Complications: no  Cardiovascular status: blood pressure returned to baseline  Respiratory status: acceptable  Hydration status: stable

## 2022-05-06 NOTE — PROGRESS NOTES
Reviewed patient's medical and surgical history in electronic record and with patient at the bedside. All questions regarding procedure answered. Scope verified using 2 person system. Family in waiting room.     Electronically signed by Bjorn Machado RN on 5/6/2022 at 10:28 AM

## 2022-05-06 NOTE — PROGRESS NOTES
Discharge instructions review with patient. All home medications have been reviewed, pt v/u. Discharge instructions signed. Pt discharged via wheelchair. Pt discharged with all belongings.  taking stable pt home.

## 2022-05-09 ENCOUNTER — TELEPHONE (OUTPATIENT)
Dept: BARIATRICS/WEIGHT MGMT | Age: 27
End: 2022-05-09

## 2022-05-12 ENCOUNTER — PATIENT MESSAGE (OUTPATIENT)
Dept: BARIATRICS/WEIGHT MGMT | Age: 27
End: 2022-05-12

## 2022-05-12 NOTE — TELEPHONE ENCOUNTER
From: Keven Duvall  To: Dr. Whitaker Gulston: 5/12/2022 12:55 PM EDT  Subject: Endoscopy Results     Hi , I see my results from the endoscopy and it says I have GERD. Im hearing that the sleeve is bad for GERD or acid reflux. Is this a concern for my surgery on the 23rd? Do I need to switch the bypass ? Whats your advice ?

## 2022-05-14 DIAGNOSIS — R73.03 PREDIABETES: ICD-10-CM

## 2022-05-14 DIAGNOSIS — E66.01 MORBID OBESITY WITH BMI OF 45.0-49.9, ADULT (HCC): ICD-10-CM

## 2022-05-16 RX ORDER — SEMAGLUTIDE 1.34 MG/ML
INJECTION, SOLUTION SUBCUTANEOUS
Qty: 4 PEN | Refills: 3 | Status: SHIPPED | OUTPATIENT
Start: 2022-05-16 | End: 2022-05-19

## 2022-05-16 NOTE — TELEPHONE ENCOUNTER
Medication:   Requested Prescriptions     Pending Prescriptions Disp Refills    OZEMPIC, 0.25 OR 0.5 MG/DOSE, 2 MG/1.5ML SOPN [Pharmacy Med Name: [facility] 0.25-0.5 MG/DOSE PEN]  1     Sig: INJECT 0.25MG INTO THE SKIN ONE TIME PER WEEK        Last Filled:      Patient Phone Number: [phone] (home)     Last appt: 2/7/2022   Next appt: 5/17/2022    Last OARRS: No flowsheet data found.

## 2022-05-17 ENCOUNTER — PATIENT MESSAGE (OUTPATIENT)
Dept: PRIMARY CARE CLINIC | Age: 27
End: 2022-05-17

## 2022-05-17 DIAGNOSIS — J45.20 MILD INTERMITTENT ASTHMA WITHOUT COMPLICATION: ICD-10-CM

## 2022-05-17 DIAGNOSIS — J30.1 CHRONIC SEASONAL ALLERGIC RHINITIS DUE TO POLLEN: ICD-10-CM

## 2022-05-17 RX ORDER — CETIRIZINE HYDROCHLORIDE 10 MG/1
TABLET ORAL
Qty: 30 TABLET | Refills: 4 | Status: SHIPPED | OUTPATIENT
Start: 2022-05-17

## 2022-05-17 NOTE — TELEPHONE ENCOUNTER
Medication:   Requested Prescriptions     Pending Prescriptions Disp Refills    PROAIR  (90 Base) MCG/ACT inhaler [Pharmacy Med Name: PROAIR HFA 90 MCG INHALER] 8.5 each 3     Sig: TAKE 2 PUFFS BY MOUTH EVERY 6 HOURS AS NEEDED FOR WHEEZE    cetirizine (ZYRTEC) 10 MG tablet [Pharmacy Med Name: CETIRIZINE HCL 10 MG TABLET] 30 tablet 4     Sig: TAKE 1 TABLET BY MOUTH EVERY DAY        Last Filled:      Patient Phone Number: [phone] (home)     Last appt: 2/7/2022   Next appt: 5/17/2022    Last OARRS: No flowsheet data found.

## 2022-05-18 NOTE — TELEPHONE ENCOUNTER
From: Brittney Arrieta  To: Ines Crook  Sent: 5/17/2022 5:02 PM EDT  Subject: Pre Op Physical     Hi Dr. Ena Mathur I just wanted to message you an apologise for missing the last two appointments for some reason I didnt see the first two on my [place]. However I did reschedule for this Thursday before my surgery Monday and I will for SURE be there! Im always on time for our appointments for some reason those two slipped away but this Thursday I will for sure be there !

## 2022-05-19 ENCOUNTER — OFFICE VISIT (OUTPATIENT)
Dept: PRIMARY CARE CLINIC | Age: 27
End: 2022-05-19
Payer: COMMERCIAL

## 2022-05-19 VITALS
DIASTOLIC BLOOD PRESSURE: 77 MMHG | WEIGHT: 293 LBS | BODY MASS INDEX: 43.4 KG/M2 | RESPIRATION RATE: 14 BRPM | SYSTOLIC BLOOD PRESSURE: 117 MMHG | TEMPERATURE: 96.9 F | HEIGHT: 69 IN | HEART RATE: 102 BPM | OXYGEN SATURATION: 98 %

## 2022-05-19 DIAGNOSIS — Z01.818 PREOP EXAMINATION: ICD-10-CM

## 2022-05-19 DIAGNOSIS — E66.01 MORBID OBESITY WITH BMI OF 45.0-49.9, ADULT (HCC): Primary | ICD-10-CM

## 2022-05-19 PROCEDURE — G8427 DOCREV CUR MEDS BY ELIG CLIN: HCPCS | Performed by: NURSE PRACTITIONER

## 2022-05-19 PROCEDURE — 1036F TOBACCO NON-USER: CPT | Performed by: NURSE PRACTITIONER

## 2022-05-19 PROCEDURE — 99213 OFFICE O/P EST LOW 20 MIN: CPT | Performed by: NURSE PRACTITIONER

## 2022-05-19 PROCEDURE — G8417 CALC BMI ABV UP PARAM F/U: HCPCS | Performed by: NURSE PRACTITIONER

## 2022-05-19 ASSESSMENT — PATIENT HEALTH QUESTIONNAIRE - PHQ9
SUM OF ALL RESPONSES TO PHQ QUESTIONS 1-9: 0
2. FEELING DOWN, DEPRESSED OR HOPELESS: 0
SUM OF ALL RESPONSES TO PHQ9 QUESTIONS 1 & 2: 0
SUM OF ALL RESPONSES TO PHQ QUESTIONS 1-9: 0
1. LITTLE INTEREST OR PLEASURE IN DOING THINGS: 0
SUM OF ALL RESPONSES TO PHQ QUESTIONS 1-9: 0
SUM OF ALL RESPONSES TO PHQ QUESTIONS 1-9: 0

## 2022-05-19 NOTE — TELEPHONE ENCOUNTER
[facility] [id number]   CPT [id number]   DOS 5/23/22  1 day inpatient

## 2022-05-19 NOTE — PROGRESS NOTES
Preoperative Consultation      Chrystal Lyons  YOB: 1995    Date of Service:  5/19/2022    Vitals:    05/19/22 1426   BP: 117/77   Site: Right Upper Arm   Position: Sitting   Cuff Size: Large Adult   Pulse: 102   Resp: 14   Temp: 96.9 °F (36.1 °C)   SpO2: 98%   Weight: 296 lb (134.3 kg)   Height: 5' 9\" (1.753 m)      Wt Readings from Last 2 Encounters:   05/19/22 296 lb (134.3 kg)   05/06/22 294 lb (133.4 kg)     BP Readings from Last 3 Encounters:   05/19/22 117/77   05/06/22 117/79   05/06/22 (!) 148/64        Chief Complaint   Patient presents with   [place] Mcgee Other     Pre-operative physical Sleeve Gastrectomy     Allergies   Allergen Reactions    Seasonal      Outpatient Medications Marked as Taking for the 5/19/22 encounter (Office Visit) with CASPER Moses CNP   Medication Sig Dispense Refill    PROAIR  (90 Base) MCG/ACT inhaler TAKE 2 PUFFS BY MOUTH EVERY 6 HOURS AS NEEDED FOR WHEEZE 8.5 each 3    cetirizine (ZYRTEC) 10 MG tablet TAKE 1 TABLET BY MOUTH EVERY DAY 30 tablet 4    omeprazole (PRILOSEC) 20 MG delayed release capsule Take 1 capsule by mouth every morning (before breakfast) 90 capsule 1    venlafaxine (EFFEXOR XR) 37.5 MG extended release capsule TAKE 1 CAPSULE BY MOUTH EVERY DAY (Patient taking differently: at bedtime TAKE 1 CAPSULE BY MOUTH EVERY DAY) 60 capsule 3    ferrous sulfate (IRON 325) 325 (65 Fe) MG tablet Take 1 tablet by mouth 2 times daily (with meals) 60 tablet 3    Benzoyl Peroxide (BENZAC AC) 10 % external wash Apply topically 2 times daily. 187 g 3    montelukast (SINGULAIR) 10 MG tablet TAKE 1 TABLET BY MOUTH EVERY DAY AT NIGHT 90 tablet 1    fluticasone (FLONASE) 50 MCG/ACT nasal spray SHAKE LIQUID AND USE 1 SPRAY IN EACH NOSTRIL DAILY 1 Bottle 2       This patient presents to the office today for a preoperative consultation at the request of surgeon, Dr. Ladi Murray, who plans on performing laparoscopic sleeve gastrectomy on May 25.        Known anesthesia problems: None    Patient Active Problem List   Diagnosis    Asthma    Iron deficiency anemia    Lichen simplex    Menorrhagia with regular cycle    Generalized anxiety disorder    Seasonal allergic rhinitis due to pollen    Hidradenitis    Menorrhagia    Prediabetes    Obstructive sleep apnea    Morbid obesity with BMI of 45.0-49.9, adult (HCC)    Postprandial abdominal bloating    Chronic GERD    Elevated blood pressure reading    PVC (premature ventricular contraction)       Past Medical History:   Diagnosis Date    Allergic rhinitis     Anxiety     Asthma     Chronic GERD 11/30/2021    Diabetes mellitus ([address]     Hidradenitis     History of prediabetes 2012    Iron deficiency anemia     Menorrhagia     Migraine     Obesity     Obstructive sleep apnea     cpap    Pre-operative clearance 10/27/2020    Prediabetes 2/17/2020     Past Surgical History:   Procedure Laterality Date    CHOLECYSTECTOMY      TONSILLECTOMY  1998    UPPER GASTROINTESTINAL ENDOSCOPY N/A 5/6/2022    EGD BIOPSY performed by Stephen Del Real MD at [address] [place] [address]     Family History   Problem Relation Age of Onset    Other Mother         uterine fibroids    Diabetes Mother     Thyroid Disease Mother     Elevated Lipids Mother     Diabetes Father     Hypertension Father     Stroke Father 64    Sleep Apnea Father     Diabetes Other     Hypertension Other     Other Other         kidney disease (paternal uncle)    Other Maternal Grandmother         diverticulitis    Diabetes Maternal Grandmother     Other Paternal Grandmother         bone cancer     Heart Attack Paternal Grandmother     Colon Cancer Paternal Grandfather      Social History     Socioeconomic History    Marital status: Single     Spouse name: Not on file    Number of children: 0    Years of education: Not on file    Highest education level: Not on file   Occupational History    Occupation: student     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     Occupation:    Tobacco Use    Smoking status: Never Smoker    Smokeless tobacco: Never Used   Vaping Use    Vaping Use: Never used   Substance and Sexual Activity    Alcohol use: Not Currently     Alcohol/week: 1.0 standard drink     Types: 1 Glasses of wine per week     Comment: belongs to a wine-club-  (once a month )     Drug use: No    Sexual activity: Yes     Partners: Male   Other Topics Concern    Not on file   Social History Narrative    Lives with mother and nephew     Social Determinants of Health     Financial Resource Strain: Low Risk     Difficulty of Paying Living Expenses: Not hard at all   Food Insecurity: No Food Insecurity    Worried About Running Out of Food in the Last Year: Never true    [address] in the Last Year: Never true   Transportation Needs:     Lack of Transportation (Medical): Not on file    Lack of Transportation (Non-Medical):  Not on file   Physical Activity:     Days of Exercise per Week: Not on file    Minutes of Exercise per Session: Not on file   Stress:     Feeling of Stress : Not on file   Social Connections:     Frequency of Communication with Friends and Family: Not on file    Frequency of Social Gatherings with Friends and Family: Not on file    Attends [employer] Services: Not on file    Active Member of [address] [place] [address] or Organizations: Not on file    Attends Club or Organization Meetings: Not on file    Marital Status: Not on file   Intimate Partner Violence:     Fear of Current or Ex-Partner: Not on file    Emotionally Abused: Not on file    Physically Abused: Not on file    Sexually Abused: Not on file   Housing Stability:     Unable to Pay for Housing in the Last Year: Not on file    Number of [place] in the Last Year: Not on file    Unstable Housing in the Last Year: Not on file       Review of Systems  A comprehensive review of systems was negative except for what was noted in the HPI. Physical Exam   Constitutional: She is oriented to person, place, and time. She appears well-developed and well-nourished. No distress. HENT:   Head: Normocephalic and atraumatic. Mouth/Throat: Uvula is midline, oropharynx is clear and moist and mucous membranes are normal.   Eyes: Conjunctivae and EOM are normal. Pupils are equal, round, and reactive to light. Neck: Trachea normal and normal range of motion. Neck supple. No JVD present. Carotid bruit is not present. No mass and no thyromegaly present. Cardiovascular: Normal rate, regular rhythm, normal heart sounds and intact distal pulses. Exam reveals no gallop and no friction rub. No murmur heard. Pulmonary/Chest: Effort normal and breath sounds normal. No respiratory distress. She has no wheezes. She has no rales. Abdominal: Soft. Normal aorta and bowel sounds are normal. She exhibits no distension and no mass. There is no hepatosplenomegaly. No tenderness. Musculoskeletal: She exhibits no edema and no tenderness. Neurological: She is alert and oriented to person, place, and time. She has normal strength. No cranial nerve deficit or sensory deficit. Coordination and gait normal.   Skin: Skin is warm and dry. No rash noted. No erythema. Psychiatric: She has a normal mood and affect. Her behavior is normal.     EKG Interpretation:  Deferred. Cleared by Cardiology.   EKG on 1/12/2022 and cardiac event monitor on 3/10/2022    Lab Review   Lab Results   Component Value Date     05/03/2022     12/15/2021     07/29/2021    K 4.5 05/03/2022    K 4.1 12/15/2021    K 4.7 07/29/2021    K 3.8 11/13/2020    K 3.7 08/24/2020    CO2 21 05/03/2022    CO2 24 12/15/2021    CO2 25 07/29/2021    BUN 9 05/03/2022    BUN 7 12/15/2021    BUN 8 07/29/2021    CREATININE 0.7 05/03/2022    CREATININE 0.5 12/15/2021    CREATININE 0.6 07/29/2021    GLUCOSE 84 05/03/2022    GLUCOSE 96 12/15/2021    GLUCOSE 114 07/29/2021 CALCIUM 9.2 05/03/2022    CALCIUM 9.1 12/15/2021    CALCIUM 9.6 07/29/2021     Lab Results   Component Value Date    WBC 6.9 05/03/2022    HGB 12.7 05/03/2022    HCT 38.7 05/03/2022    MCV 79.6 05/03/2022     05/03/2022           Assessment:       31 y.o. patient with planned surgery as above. Known risk factors for perioperative complications: elevated blood pressure, prediabetes, obesity. Plan:     1. Preoperative workup as follows: all labs and testing has been completed prior to appointment  2. Change in medication regimen before surgery: None  3. Prophylaxis for cardiac events with perioperative beta-blockers: Not indicated  4. No contraindications to planned surgery

## 2022-05-23 ENCOUNTER — ANESTHESIA EVENT (OUTPATIENT)
Dept: OPERATING ROOM | Age: 27
DRG: 403 | End: 2022-05-23
Payer: COMMERCIAL

## 2022-05-23 ENCOUNTER — ANESTHESIA (OUTPATIENT)
Dept: OPERATING ROOM | Age: 27
DRG: 403 | End: 2022-05-23
Payer: COMMERCIAL

## 2022-05-23 ENCOUNTER — HOSPITAL ENCOUNTER (INPATIENT)
Age: 27
LOS: 1 days | Discharge: HOME | DRG: 403 | End: 2022-05-24
Attending: SURGERY | Admitting: SURGERY
Payer: COMMERCIAL

## 2022-05-23 DIAGNOSIS — Z01.818 PREOP TESTING: Primary | ICD-10-CM

## 2022-05-23 DIAGNOSIS — Z98.84 S/P LAPAROSCOPIC SLEEVE GASTRECTOMY: ICD-10-CM

## 2022-05-23 PROBLEM — E66.01 MORBID OBESITY WITH BMI OF 40.0-44.9, ADULT (HCC): Status: ACTIVE | Noted: 2022-05-23

## 2022-05-23 LAB
ABO/RH: NORMAL
ANTIBODY SCREEN: NORMAL
GLUCOSE BLD-MCNC: 107 MG/DL (ref 70–99)
GLUCOSE BLD-MCNC: 153 MG/DL (ref 70–99)
HCG(URINE) PREGNANCY TEST: NEGATIVE
PERFORMED ON: ABNORMAL
PERFORMED ON: ABNORMAL

## 2022-05-23 PROCEDURE — 7100000001 HC PACU RECOVERY - ADDTL 15 MIN: Performed by: SURGERY

## 2022-05-23 PROCEDURE — 1200000000 HC SEMI PRIVATE

## 2022-05-23 PROCEDURE — 94761 N-INVAS EAR/PLS OXIMETRY MLT: CPT

## 2022-05-23 PROCEDURE — 2580000003 HC RX 258: Performed by: SURGERY

## 2022-05-23 PROCEDURE — 6360000002 HC RX W HCPCS: Performed by: NURSE ANESTHETIST, CERTIFIED REGISTERED

## 2022-05-23 PROCEDURE — 6360000002 HC RX W HCPCS

## 2022-05-23 PROCEDURE — 88307 TISSUE EXAM BY PATHOLOGIST: CPT

## 2022-05-23 PROCEDURE — 6360000002 HC RX W HCPCS: Performed by: SURGERY

## 2022-05-23 PROCEDURE — 2500000003 HC RX 250 WO HCPCS: Performed by: NURSE ANESTHETIST, CERTIFIED REGISTERED

## 2022-05-23 PROCEDURE — 0DB64Z3 EXCISION OF STOMACH, PERCUTANEOUS ENDOSCOPIC APPROACH, VERTICAL: ICD-10-PCS | Performed by: SURGERY

## 2022-05-23 PROCEDURE — 7100000000 HC PACU RECOVERY - FIRST 15 MIN: Performed by: SURGERY

## 2022-05-23 PROCEDURE — 86901 BLOOD TYPING SEROLOGIC RH(D): CPT

## 2022-05-23 PROCEDURE — 86850 RBC ANTIBODY SCREEN: CPT

## 2022-05-23 PROCEDURE — 2700000000 HC OXYGEN THERAPY PER DAY

## 2022-05-23 PROCEDURE — 84703 CHORIONIC GONADOTROPIN ASSAY: CPT

## 2022-05-23 PROCEDURE — 6370000000 HC RX 637 (ALT 250 FOR IP): Performed by: SURGERY

## 2022-05-23 PROCEDURE — 6360000002 HC RX W HCPCS: Performed by: ANESTHESIOLOGY

## 2022-05-23 PROCEDURE — 2709999900 HC NON-CHARGEABLE SUPPLY: Performed by: SURGERY

## 2022-05-23 PROCEDURE — 43775 LAP SLEEVE GASTRECTOMY: CPT | Performed by: SURGERY

## 2022-05-23 PROCEDURE — A4216 STERILE WATER/SALINE, 10 ML: HCPCS | Performed by: SURGERY

## 2022-05-23 PROCEDURE — 3700000000 HC ANESTHESIA ATTENDED CARE: Performed by: SURGERY

## 2022-05-23 PROCEDURE — 36415 COLL VENOUS BLD VENIPUNCTURE: CPT

## 2022-05-23 PROCEDURE — 2720000010 HC SURG SUPPLY STERILE: Performed by: SURGERY

## 2022-05-23 PROCEDURE — 86900 BLOOD TYPING SEROLOGIC ABO: CPT

## 2022-05-23 PROCEDURE — 3600000015 HC SURGERY LEVEL 5 ADDTL 15MIN: Performed by: SURGERY

## 2022-05-23 PROCEDURE — A4217 STERILE WATER/SALINE, 500 ML: HCPCS | Performed by: SURGERY

## 2022-05-23 PROCEDURE — 3600000005 HC SURGERY LEVEL 5 BASE: Performed by: SURGERY

## 2022-05-23 PROCEDURE — P9045 ALBUMIN (HUMAN), 5%, 250 ML: HCPCS | Performed by: NURSE ANESTHETIST, CERTIFIED REGISTERED

## 2022-05-23 PROCEDURE — C9113 INJ PANTOPRAZOLE SODIUM, VIA: HCPCS | Performed by: SURGERY

## 2022-05-23 PROCEDURE — 3700000001 HC ADD 15 MINUTES (ANESTHESIA): Performed by: SURGERY

## 2022-05-23 PROCEDURE — 2500000003 HC RX 250 WO HCPCS: Performed by: SURGERY

## 2022-05-23 RX ORDER — PROMETHAZINE HYDROCHLORIDE 25 MG/ML
6.25 INJECTION, SOLUTION INTRAMUSCULAR; INTRAVENOUS EVERY 6 HOURS PRN
Status: DISCONTINUED | OUTPATIENT
Start: 2022-05-23 | End: 2022-05-24 | Stop reason: HOSPADM

## 2022-05-23 RX ORDER — LABETALOL HYDROCHLORIDE 5 MG/ML
10 INJECTION, SOLUTION INTRAVENOUS
Status: DISCONTINUED | OUTPATIENT
Start: 2022-05-23 | End: 2022-05-24 | Stop reason: HOSPADM

## 2022-05-23 RX ORDER — PROPOFOL 10 MG/ML
INJECTION, EMULSION INTRAVENOUS PRN
Status: DISCONTINUED | OUTPATIENT
Start: 2022-05-23 | End: 2022-05-23 | Stop reason: SDUPTHER

## 2022-05-23 RX ORDER — HYDRALAZINE HYDROCHLORIDE 20 MG/ML
10 INJECTION INTRAMUSCULAR; INTRAVENOUS
Status: DISCONTINUED | OUTPATIENT
Start: 2022-05-23 | End: 2022-05-24 | Stop reason: HOSPADM

## 2022-05-23 RX ORDER — DEXAMETHASONE SODIUM PHOSPHATE 4 MG/ML
INJECTION, SOLUTION INTRA-ARTICULAR; INTRALESIONAL; INTRAMUSCULAR; INTRAVENOUS; SOFT TISSUE PRN
Status: DISCONTINUED | OUTPATIENT
Start: 2022-05-23 | End: 2022-05-23 | Stop reason: SDUPTHER

## 2022-05-23 RX ORDER — HYDROMORPHONE HCL IN WATER/PF 6 MG/30 ML
PATIENT CONTROLLED ANALGESIA SYRINGE INTRAVENOUS CONTINUOUS
Status: DISCONTINUED | OUTPATIENT
Start: 2022-05-23 | End: 2022-05-24

## 2022-05-23 RX ORDER — SODIUM CHLORIDE 0.9 % (FLUSH) 0.9 %
5-40 SYRINGE (ML) INJECTION PRN
Status: DISCONTINUED | OUTPATIENT
Start: 2022-05-23 | End: 2022-05-24 | Stop reason: HOSPADM

## 2022-05-23 RX ORDER — FENTANYL CITRATE 50 UG/ML
25 INJECTION, SOLUTION INTRAMUSCULAR; INTRAVENOUS EVERY 5 MIN PRN
Status: DISCONTINUED | OUTPATIENT
Start: 2022-05-23 | End: 2022-05-23 | Stop reason: HOSPADM

## 2022-05-23 RX ORDER — MAGNESIUM HYDROXIDE 1200 MG/15ML
LIQUID ORAL CONTINUOUS PRN
Status: COMPLETED | OUTPATIENT
Start: 2022-05-23 | End: 2022-05-23

## 2022-05-23 RX ORDER — MIDAZOLAM HYDROCHLORIDE 1 MG/ML
INJECTION INTRAMUSCULAR; INTRAVENOUS PRN
Status: DISCONTINUED | OUTPATIENT
Start: 2022-05-23 | End: 2022-05-23 | Stop reason: SDUPTHER

## 2022-05-23 RX ORDER — ONDANSETRON 2 MG/ML
4 INJECTION INTRAMUSCULAR; INTRAVENOUS
Status: COMPLETED | OUTPATIENT
Start: 2022-05-23 | End: 2022-05-23

## 2022-05-23 RX ORDER — NALOXONE HYDROCHLORIDE 0.4 MG/ML
INJECTION, SOLUTION INTRAMUSCULAR; INTRAVENOUS; SUBCUTANEOUS PRN
Status: DISCONTINUED | OUTPATIENT
Start: 2022-05-23 | End: 2022-05-24

## 2022-05-23 RX ORDER — HYDRALAZINE HYDROCHLORIDE 20 MG/ML
10 INJECTION INTRAMUSCULAR; INTRAVENOUS
Status: DISCONTINUED | OUTPATIENT
Start: 2022-05-23 | End: 2022-05-23 | Stop reason: HOSPADM

## 2022-05-23 RX ORDER — LIDOCAINE HYDROCHLORIDE 20 MG/ML
INJECTION, SOLUTION INFILTRATION; PERINEURAL PRN
Status: DISCONTINUED | OUTPATIENT
Start: 2022-05-23 | End: 2022-05-23 | Stop reason: SDUPTHER

## 2022-05-23 RX ORDER — SODIUM CHLORIDE, SODIUM LACTATE, POTASSIUM CHLORIDE, CALCIUM CHLORIDE 600; 310; 30; 20 MG/100ML; MG/100ML; MG/100ML; MG/100ML
INJECTION, SOLUTION INTRAVENOUS CONTINUOUS
Status: DISCONTINUED | OUTPATIENT
Start: 2022-05-23 | End: 2022-05-23

## 2022-05-23 RX ORDER — LIDOCAINE HYDROCHLORIDE 10 MG/ML
0.5 INJECTION, SOLUTION EPIDURAL; INFILTRATION; INTRACAUDAL; PERINEURAL ONCE
Status: DISCONTINUED | OUTPATIENT
Start: 2022-05-23 | End: 2022-05-23 | Stop reason: HOSPADM

## 2022-05-23 RX ORDER — LABETALOL HYDROCHLORIDE 5 MG/ML
10 INJECTION, SOLUTION INTRAVENOUS
Status: DISCONTINUED | OUTPATIENT
Start: 2022-05-23 | End: 2022-05-23 | Stop reason: HOSPADM

## 2022-05-23 RX ORDER — GLYCOPYRROLATE 0.2 MG/ML
INJECTION INTRAMUSCULAR; INTRAVENOUS PRN
Status: DISCONTINUED | OUTPATIENT
Start: 2022-05-23 | End: 2022-05-23 | Stop reason: SDUPTHER

## 2022-05-23 RX ORDER — SODIUM CHLORIDE 0.9 % (FLUSH) 0.9 %
5-40 SYRINGE (ML) INJECTION EVERY 12 HOURS SCHEDULED
Status: DISCONTINUED | OUTPATIENT
Start: 2022-05-23 | End: 2022-05-24 | Stop reason: HOSPADM

## 2022-05-23 RX ORDER — HYOSCYAMINE SULFATE 0.5 MG/ML
250 INJECTION, SOLUTION SUBCUTANEOUS EVERY 4 HOURS PRN
Status: DISCONTINUED | OUTPATIENT
Start: 2022-05-23 | End: 2022-05-24 | Stop reason: HOSPADM

## 2022-05-23 RX ORDER — ENOXAPARIN SODIUM 100 MG/ML
30 INJECTION SUBCUTANEOUS EVERY 12 HOURS SCHEDULED
Status: DISCONTINUED | OUTPATIENT
Start: 2022-05-23 | End: 2022-05-24 | Stop reason: HOSPADM

## 2022-05-23 RX ORDER — METOCLOPRAMIDE HYDROCHLORIDE 5 MG/ML
10 INJECTION INTRAMUSCULAR; INTRAVENOUS EVERY 6 HOURS PRN
Status: DISCONTINUED | OUTPATIENT
Start: 2022-05-23 | End: 2022-05-24 | Stop reason: HOSPADM

## 2022-05-23 RX ORDER — DIPHENHYDRAMINE HYDROCHLORIDE 50 MG/ML
12.5 INJECTION INTRAMUSCULAR; INTRAVENOUS EVERY 6 HOURS PRN
Status: DISCONTINUED | OUTPATIENT
Start: 2022-05-23 | End: 2022-05-24 | Stop reason: HOSPADM

## 2022-05-23 RX ORDER — SODIUM CHLORIDE, SODIUM LACTATE, POTASSIUM CHLORIDE, CALCIUM CHLORIDE 600; 310; 30; 20 MG/100ML; MG/100ML; MG/100ML; MG/100ML
INJECTION, SOLUTION INTRAVENOUS CONTINUOUS
Status: DISCONTINUED | OUTPATIENT
Start: 2022-05-23 | End: 2022-05-24 | Stop reason: HOSPADM

## 2022-05-23 RX ORDER — MAGNESIUM SULFATE HEPTAHYDRATE 500 MG/ML
INJECTION, SOLUTION INTRAMUSCULAR; INTRAVENOUS PRN
Status: DISCONTINUED | OUTPATIENT
Start: 2022-05-23 | End: 2022-05-23 | Stop reason: SDUPTHER

## 2022-05-23 RX ORDER — HYDROMORPHONE HCL IN WATER/PF 6 MG/30 ML
PATIENT CONTROLLED ANALGESIA SYRINGE INTRAVENOUS
Status: COMPLETED
Start: 2022-05-23 | End: 2022-05-23

## 2022-05-23 RX ORDER — PROCHLORPERAZINE EDISYLATE 5 MG/ML
5 INJECTION INTRAMUSCULAR; INTRAVENOUS
Status: COMPLETED | OUTPATIENT
Start: 2022-05-23 | End: 2022-05-23

## 2022-05-23 RX ORDER — METHYLENE BLUE 10 MG/ML
INJECTION INTRAVENOUS
Status: COMPLETED | OUTPATIENT
Start: 2022-05-23 | End: 2022-05-23

## 2022-05-23 RX ORDER — ONDANSETRON 2 MG/ML
4 INJECTION INTRAMUSCULAR; INTRAVENOUS EVERY 6 HOURS PRN
Status: DISCONTINUED | OUTPATIENT
Start: 2022-05-23 | End: 2022-05-24 | Stop reason: HOSPADM

## 2022-05-23 RX ORDER — SODIUM CHLORIDE 9 MG/ML
INJECTION, SOLUTION INTRAVENOUS PRN
Status: DISCONTINUED | OUTPATIENT
Start: 2022-05-23 | End: 2022-05-24 | Stop reason: HOSPADM

## 2022-05-23 RX ORDER — SUCCINYLCHOLINE CHLORIDE 20 MG/ML
INJECTION INTRAMUSCULAR; INTRAVENOUS PRN
Status: DISCONTINUED | OUTPATIENT
Start: 2022-05-23 | End: 2022-05-23 | Stop reason: SDUPTHER

## 2022-05-23 RX ORDER — KETAMINE HYDROCHLORIDE 10 MG/ML
INJECTION, SOLUTION INTRAMUSCULAR; INTRAVENOUS PRN
Status: DISCONTINUED | OUTPATIENT
Start: 2022-05-23 | End: 2022-05-23 | Stop reason: SDUPTHER

## 2022-05-23 RX ORDER — HYDROMORPHONE HCL 110MG/55ML
0.5 PATIENT CONTROLLED ANALGESIA SYRINGE INTRAVENOUS EVERY 5 MIN PRN
Status: DISCONTINUED | OUTPATIENT
Start: 2022-05-23 | End: 2022-05-23 | Stop reason: HOSPADM

## 2022-05-23 RX ORDER — SCOLOPAMINE TRANSDERMAL SYSTEM 1 MG/1
1 PATCH, EXTENDED RELEASE TRANSDERMAL
Status: DISCONTINUED | OUTPATIENT
Start: 2022-05-26 | End: 2022-05-24 | Stop reason: HOSPADM

## 2022-05-23 RX ORDER — LIDOCAINE 4 G/G
1 PATCH TOPICAL DAILY
Status: DISCONTINUED | OUTPATIENT
Start: 2022-05-23 | End: 2022-05-24 | Stop reason: HOSPADM

## 2022-05-23 RX ORDER — SCOLOPAMINE TRANSDERMAL SYSTEM 1 MG/1
1 PATCH, EXTENDED RELEASE TRANSDERMAL ONCE
Status: DISCONTINUED | OUTPATIENT
Start: 2022-05-23 | End: 2022-05-23

## 2022-05-23 RX ORDER — ALBUTEROL SULFATE 90 UG/1
2 AEROSOL, METERED RESPIRATORY (INHALATION) EVERY 6 HOURS PRN
Status: DISCONTINUED | OUTPATIENT
Start: 2022-05-23 | End: 2022-05-24 | Stop reason: HOSPADM

## 2022-05-23 RX ORDER — FAMOTIDINE 10 MG/ML
INJECTION, SOLUTION INTRAVENOUS PRN
Status: DISCONTINUED | OUTPATIENT
Start: 2022-05-23 | End: 2022-05-23 | Stop reason: SDUPTHER

## 2022-05-23 RX ORDER — ENOXAPARIN SODIUM 100 MG/ML
40 INJECTION SUBCUTANEOUS ONCE
Status: DISCONTINUED | OUTPATIENT
Start: 2022-05-23 | End: 2022-05-23

## 2022-05-23 RX ORDER — ENOXAPARIN SODIUM 100 MG/ML
30 INJECTION SUBCUTANEOUS ONCE
Status: COMPLETED | OUTPATIENT
Start: 2022-05-23 | End: 2022-05-23

## 2022-05-23 RX ORDER — LIDOCAINE HYDROCHLORIDE 10 MG/ML
1 INJECTION, SOLUTION EPIDURAL; INFILTRATION; INTRACAUDAL; PERINEURAL
Status: DISCONTINUED | OUTPATIENT
Start: 2022-05-23 | End: 2022-05-23 | Stop reason: HOSPADM

## 2022-05-23 RX ORDER — HYDROMORPHONE HCL 110MG/55ML
PATIENT CONTROLLED ANALGESIA SYRINGE INTRAVENOUS PRN
Status: DISCONTINUED | OUTPATIENT
Start: 2022-05-23 | End: 2022-05-23 | Stop reason: SDUPTHER

## 2022-05-23 RX ORDER — FENTANYL CITRATE 50 UG/ML
INJECTION, SOLUTION INTRAMUSCULAR; INTRAVENOUS PRN
Status: DISCONTINUED | OUTPATIENT
Start: 2022-05-23 | End: 2022-05-23 | Stop reason: SDUPTHER

## 2022-05-23 RX ORDER — HYDROMORPHONE HYDROCHLORIDE 1 MG/ML
0.5 INJECTION, SOLUTION INTRAMUSCULAR; INTRAVENOUS; SUBCUTANEOUS
Status: DISCONTINUED | OUTPATIENT
Start: 2022-05-23 | End: 2022-05-24 | Stop reason: HOSPADM

## 2022-05-23 RX ORDER — BUPIVACAINE HYDROCHLORIDE 2.5 MG/ML
INJECTION, SOLUTION EPIDURAL; INFILTRATION; INTRACAUDAL
Status: COMPLETED | OUTPATIENT
Start: 2022-05-23 | End: 2022-05-23

## 2022-05-23 RX ORDER — APREPITANT 80 MG/1
80 CAPSULE ORAL ONCE
Status: COMPLETED | OUTPATIENT
Start: 2022-05-23 | End: 2022-05-23

## 2022-05-23 RX ORDER — OXYCODONE HYDROCHLORIDE 5 MG/1
5 TABLET ORAL
Status: DISCONTINUED | OUTPATIENT
Start: 2022-05-23 | End: 2022-05-23 | Stop reason: HOSPADM

## 2022-05-23 RX ORDER — ALBUMIN, HUMAN INJ 5% 5 %
SOLUTION INTRAVENOUS PRN
Status: DISCONTINUED | OUTPATIENT
Start: 2022-05-23 | End: 2022-05-23 | Stop reason: SDUPTHER

## 2022-05-23 RX ORDER — ROCURONIUM BROMIDE 10 MG/ML
INJECTION, SOLUTION INTRAVENOUS PRN
Status: DISCONTINUED | OUTPATIENT
Start: 2022-05-23 | End: 2022-05-23 | Stop reason: SDUPTHER

## 2022-05-23 RX ORDER — ONDANSETRON 2 MG/ML
INJECTION INTRAMUSCULAR; INTRAVENOUS PRN
Status: DISCONTINUED | OUTPATIENT
Start: 2022-05-23 | End: 2022-05-23 | Stop reason: SDUPTHER

## 2022-05-23 RX ADMIN — ONDANSETRON 4 MG: 2 INJECTION INTRAMUSCULAR; INTRAVENOUS at 11:40

## 2022-05-23 RX ADMIN — METOCLOPRAMIDE 10 MG: 5 INJECTION, SOLUTION INTRAMUSCULAR; INTRAVENOUS at 13:06

## 2022-05-23 RX ADMIN — CEFAZOLIN SODIUM 3000 MG: 10 INJECTION, POWDER, FOR SOLUTION INTRAVENOUS at 09:31

## 2022-05-23 RX ADMIN — ALBUMIN (HUMAN) 250 ML: 12.5 INJECTION, SOLUTION INTRAVENOUS at 09:59

## 2022-05-23 RX ADMIN — Medication 10 MG: at 11:31

## 2022-05-23 RX ADMIN — KETAMINE HYDROCHLORIDE 10 MG: 10 INJECTION, SOLUTION INTRAMUSCULAR; INTRAVENOUS at 10:30

## 2022-05-23 RX ADMIN — SODIUM CHLORIDE, PRESERVATIVE FREE 40 MG: 5 INJECTION INTRAVENOUS at 13:05

## 2022-05-23 RX ADMIN — FAMOTIDINE 20 MG: 10 INJECTION INTRAVENOUS at 09:24

## 2022-05-23 RX ADMIN — FENTANYL CITRATE 50 MCG: 50 INJECTION, SOLUTION INTRAMUSCULAR; INTRAVENOUS at 09:52

## 2022-05-23 RX ADMIN — SODIUM CHLORIDE, POTASSIUM CHLORIDE, SODIUM LACTATE AND CALCIUM CHLORIDE: 600; 310; 30; 20 INJECTION, SOLUTION INTRAVENOUS at 11:16

## 2022-05-23 RX ADMIN — PROCHLORPERAZINE EDISYLATE 5 MG: 5 INJECTION INTRAMUSCULAR; INTRAVENOUS at 11:59

## 2022-05-23 RX ADMIN — PHENYLEPHRINE HYDROCHLORIDE 100 MCG: 10 INJECTION INTRAVENOUS at 10:17

## 2022-05-23 RX ADMIN — FENTANYL CITRATE 50 MCG: 50 INJECTION, SOLUTION INTRAMUSCULAR; INTRAVENOUS at 09:38

## 2022-05-23 RX ADMIN — SODIUM CHLORIDE, POTASSIUM CHLORIDE, SODIUM LACTATE AND CALCIUM CHLORIDE: 600; 310; 30; 20 INJECTION, SOLUTION INTRAVENOUS at 08:07

## 2022-05-23 RX ADMIN — KETAMINE HYDROCHLORIDE 10 MG: 10 INJECTION, SOLUTION INTRAMUSCULAR; INTRAVENOUS at 09:54

## 2022-05-23 RX ADMIN — ENOXAPARIN SODIUM 30 MG: 100 INJECTION SUBCUTANEOUS at 23:21

## 2022-05-23 RX ADMIN — GLUCAGON HYDROCHLORIDE 1 MG: KIT at 10:05

## 2022-05-23 RX ADMIN — ROCURONIUM BROMIDE 40 MG: 10 INJECTION, SOLUTION INTRAVENOUS at 09:50

## 2022-05-23 RX ADMIN — ENOXAPARIN SODIUM 30 MG: 30 INJECTION SUBCUTANEOUS at 08:07

## 2022-05-23 RX ADMIN — MIDAZOLAM 2 MG: 1 INJECTION INTRAMUSCULAR; INTRAVENOUS at 09:33

## 2022-05-23 RX ADMIN — PROPOFOL 200 MG: 10 INJECTION, EMULSION INTRAVENOUS at 09:38

## 2022-05-23 RX ADMIN — PHENYLEPHRINE HYDROCHLORIDE 50 MCG: 10 INJECTION INTRAVENOUS at 09:48

## 2022-05-23 RX ADMIN — GLYCOPYRROLATE 0.2 MG: 0.2 INJECTION, SOLUTION INTRAMUSCULAR; INTRAVENOUS at 09:36

## 2022-05-23 RX ADMIN — ALBUMIN (HUMAN) 250 ML: 12.5 INJECTION, SOLUTION INTRAVENOUS at 09:45

## 2022-05-23 RX ADMIN — PHENYLEPHRINE HYDROCHLORIDE 50 MCG: 10 INJECTION INTRAVENOUS at 09:36

## 2022-05-23 RX ADMIN — LIDOCAINE HYDROCHLORIDE 100 MG: 20 INJECTION, SOLUTION INFILTRATION; PERINEURAL at 09:38

## 2022-05-23 RX ADMIN — SUCCINYLCHOLINE CHLORIDE 200 MG: 20 INJECTION, SOLUTION INTRAMUSCULAR; INTRAVENOUS at 09:39

## 2022-05-23 RX ADMIN — ROCURONIUM BROMIDE 10 MG: 10 INJECTION, SOLUTION INTRAVENOUS at 09:37

## 2022-05-23 RX ADMIN — KETAMINE HYDROCHLORIDE 10 MG: 10 INJECTION, SOLUTION INTRAMUSCULAR; INTRAVENOUS at 10:16

## 2022-05-23 RX ADMIN — KETAMINE HYDROCHLORIDE 10 MG: 10 INJECTION, SOLUTION INTRAMUSCULAR; INTRAVENOUS at 09:45

## 2022-05-23 RX ADMIN — SUGAMMADEX 200 MG: 100 INJECTION, SOLUTION INTRAVENOUS at 10:46

## 2022-05-23 RX ADMIN — PHENYLEPHRINE HYDROCHLORIDE 100 MCG: 10 INJECTION INTRAVENOUS at 10:00

## 2022-05-23 RX ADMIN — ONDANSETRON 4 MG: 2 INJECTION INTRAMUSCULAR; INTRAVENOUS at 09:51

## 2022-05-23 RX ADMIN — DEXAMETHASONE SODIUM PHOSPHATE 4 MG: 4 INJECTION, SOLUTION INTRAMUSCULAR; INTRAVENOUS at 09:47

## 2022-05-23 RX ADMIN — KETAMINE HYDROCHLORIDE 10 MG: 10 INJECTION, SOLUTION INTRAMUSCULAR; INTRAVENOUS at 10:05

## 2022-05-23 RX ADMIN — APREPITANT 80 MG: 80 CAPSULE ORAL at 07:57

## 2022-05-23 RX ADMIN — PHENYLEPHRINE HYDROCHLORIDE 100 MCG: 10 INJECTION INTRAVENOUS at 10:04

## 2022-05-23 RX ADMIN — HYDROMORPHONE HYDROCHLORIDE 1 MG: 2 INJECTION, SOLUTION INTRAMUSCULAR; INTRAVENOUS; SUBCUTANEOUS at 09:43

## 2022-05-23 RX ADMIN — PHENYLEPHRINE HYDROCHLORIDE 100 MCG: 10 INJECTION INTRAVENOUS at 10:08

## 2022-05-23 RX ADMIN — MAGNESIUM SULFATE HEPTAHYDRATE 1 G: 500 INJECTION, SOLUTION INTRAMUSCULAR; INTRAVENOUS at 09:43

## 2022-05-23 RX ADMIN — SUGAMMADEX 200 MG: 100 INJECTION, SOLUTION INTRAVENOUS at 10:57

## 2022-05-23 ASSESSMENT — PAIN DESCRIPTION - ONSET
ONSET: SUDDEN
ONSET: SUDDEN
ONSET: ON-GOING

## 2022-05-23 ASSESSMENT — PAIN SCALES - GENERAL
PAINLEVEL_OUTOF10: 8
PAINLEVEL_OUTOF10: 10
PAINLEVEL_OUTOF10: 3
PAINLEVEL_OUTOF10: 5
PAINLEVEL_OUTOF10: 0
PAINLEVEL_OUTOF10: 3

## 2022-05-23 ASSESSMENT — PAIN - FUNCTIONAL ASSESSMENT: PAIN_FUNCTIONAL_ASSESSMENT: NONE - DENIES PAIN

## 2022-05-23 ASSESSMENT — PAIN DESCRIPTION - LOCATION
LOCATION: ABDOMEN

## 2022-05-23 ASSESSMENT — PAIN DESCRIPTION - FREQUENCY
FREQUENCY: CONTINUOUS
FREQUENCY: CONTINUOUS
FREQUENCY: INTERMITTENT

## 2022-05-23 ASSESSMENT — PAIN DESCRIPTION - DESCRIPTORS
DESCRIPTORS: DISCOMFORT
DESCRIPTORS: PATIENT UNABLE TO DESCRIBE
DESCRIPTORS: DISCOMFORT

## 2022-05-23 ASSESSMENT — PAIN DESCRIPTION - PAIN TYPE
TYPE: SURGICAL PAIN

## 2022-05-23 ASSESSMENT — ENCOUNTER SYMPTOMS: SHORTNESS OF BREATH: 0

## 2022-05-23 NOTE — OP NOTE
Operative Note      Patient: Rafael Myers  YOB: 1995  MRN: [id number]    Date of Procedure: 5/23/2022  [facility]) [facility]  [address], [address] [facility] [address] . Phone: [phone]  Fax: [phone]             Procedure Note    Indications: The patient was evaluated by our multidisciplinary team and was found to be a good candidate for weight loss surgery for future weight loss. Body mass index is 41.79 kg/m². Prakash Combs Risks and benefits explained and Rafael Myers wants to proceed. Pre-operative Diagnosis:   Patient Active Problem List   Diagnosis    Asthma    Iron deficiency anemia    Lichen simplex    Menorrhagia with regular cycle    Generalized anxiety disorder    Seasonal allergic rhinitis due to pollen    Hidradenitis    Menorrhagia    Prediabetes    Obstructive sleep apnea    Morbid obesity with BMI of 45.0-49.9, adult (HCC)    Postprandial abdominal bloating    Chronic GERD    Elevated blood pressure reading    PVC (premature ventricular contraction)    Morbid obesity with BMI of 40.0-44.9, adult (HCC)         Post-operative Diagnosis:   Same      Procedure:    - Laparoscopic Sleeve Gastrectomy. Surgeon: Viviana Pettit MD, FACS. Anesthesia: General endotracheal anesthesia        Procedure Details   The patient was seen again in the Holding Room. The risks, benefits, complications, treatment options, and expected outcomes were discussed with the patient and/or family. Including but not limited to; The possibilities of reaction to medication, pulmonary aspiration, perforation of viscus, bleeding, recurrent infection, strictures, leaks, failure to lose weight, regaining weight,  malnutrition, the need for additional procedures, failure to diagnose a condition, and creating a complication requiring transfusion or operation were discussed.  There was concurrence with the proposed plan and informed consent was obtained. The site of surgery was properly noted/marked. The patient was taken to Operating Room, identified as Keenan Yan and the procedure verified as Laparoscopic Sleeve Gastrectomy & other indicated procedures. A Time Out was held and the above information confirmed. The patient was placed in the supine position and general anesthesia was induced, along with placement of orogastric tube, Venodyne boots. Lovenox SQ, Emend and IV Antibiotics given pre-operatively. All pressure points were padded properly. Patient prepped and draped in sterile fashion. A left upper quadrant incision was made and a veres needle was inserted after confirming with saline drop test.  After adequate pneumoperitoneum was obtained, a 5 mm trocar was inserted. This was followed by a endoscope which confirmed intra-abdominal placement and there was no injury on initial trocar placement. Additional ports were placed in the standard positions under direct vision. The abdomen was initially explored and no abnormalities were identified. A liver retractor was inserted through a small incision in the upper midline, lifted the liver upward, and was then secured to the OR table. The pylorus was identified and measurement was taken approximately 4-6 cm from the pylorus along the greater curvature of the stomach. The harmonic scalpel / ligasure was used to take down the attachments and short gastric vessels along the greater curve of the stomach. This was continued until all attachments were taken down and continued to the gastro-esophageal junction. A 34 Wolof dilator was placed along the lesser curvature and into the pylorus. A stapler was fired along the dilator to create an appropriate sized gastric sleeve pouch. Purple followed Tan firings were used to create the sleeve. The staple line looked very healthy and no bleeding from staple line.   The stomach was confirmed to be completely divided with uniform shape,  no twist in the sleeve and wide patent incisura. A 2-0 vicryl suture was used to over-sew and imbricate the sleeve staple line. The staple line was completely over-sewn. The dilator was removed and an Edlich tube was advanced across the sleeve to ensure patency mainly at incisura, then retracted to just above the GE junction. The stomach distal to the staple line was clamped and methylene blue saline was injected under pressure confirming No obstruction (flow noted to duodenum) and No leak. The abdomen was carefully inspected and there was no bleeding or any other abnormality. The stomach was brought out through the RUQ incision. Hemostasis was confirmed. Snow applied along suture line and/or biopsy sites to ensure hemostasis. The 15 and 12 port sites was closed using 0.0 Vicryl  suture at the level of the fascia and that was done under direct vision with the laparoscope to ensure proper closure and nothing entrapped. Local Anesthetic used at port sites. The trocar site skin wounds were closed using 4.0 Vicryl after copious Irrigation of the wounds. Dermabond / or steri strips applied. Instrument, sponge, and needle counts were correct at the conclusion of the case. Findings:  As above. Estimated Blood Loss:  Minimal           Drains: none           Total IV Fluids: 2000 ml           Specimens: Stomach (Subtotal)            Complications:  None; patient tolerated the procedure well. Disposition: PACU - hemodynamically stable. Condition: stable    Attending Attestation: I was present and scrubbed for the entire procedure.             Electronically signed by Stephen Del Real MD on 5/23/2022 at 11:10 AM

## 2022-05-23 NOTE — RT PROTOCOL NOTE
RT Inhaler-Nebulizer Bronchodilator Protocol Note    There is a bronchodilator order in the chart from a provider indicating to follow the RT Bronchodilator Protocol and there is an Initiate RT Inhaler-Nebulizer Bronchodilator Protocol order as well (see protocol at bottom of note). CXR Findings:  No results found. The findings from the last RT Protocol Assessment were as follows:   History Pulmonary Disease: Chronic pulmonary disease  Respiratory Pattern: Regular pattern and RR 12-20 bpm  Breath Sounds: Clear breath sounds  Cough: Strong, spontaneous, non-productive  Indication for Bronchodilator Therapy:    Bronchodilator Assessment Score: 2    Aerosolized bronchodilator medication orders have been revised according to the RT Inhaler-Nebulizer Bronchodilator Protocol below. Respiratory Therapist to perform RT Therapy Protocol Assessment initially then follow the protocol. Repeat RT Therapy Protocol Assessment PRN for score 0-3 or on second treatment, BID, and PRN for scores above 3. No Indications - adjust the frequency to every 6 hours PRN wheezing or bronchospasm, if no treatments needed after 48 hours then discontinue using Per Protocol order mode. If indication present, adjust the RT bronchodilator orders based on the Bronchodilator Assessment Score as indicated below. Use Inhaler orders unless patient has one or more of the following: on home nebulizer, not able to hold breath for 10 seconds, is not alert and oriented, cannot activate and use MDI correctly, or respiratory rate 25 breaths per minute or more, then use the equivalent nebulizer order(s) with same Frequency and PRN reasons based on the score. If a patient is on this medication at home then do not decrease Frequency below that used at home.     0-3 - enter or revise RT bronchodilator order(s) to equivalent RT Bronchodilator order with Frequency of every 4 hours PRN for wheezing or increased work of breathing using Per Protocol order mode. 4-6 - enter or revise RT Bronchodilator order(s) to two equivalent RT bronchodilator orders with one order with BID Frequency and one order with Frequency of every 4 hours PRN wheezing or increased work of breathing using Per Protocol order mode. 7-10 - enter or revise RT Bronchodilator order(s) to two equivalent RT bronchodilator orders with one order with TID Frequency and one order with Frequency of every 4 hours PRN wheezing or increased work of breathing using Per Protocol order mode. 11-13 - enter or revise RT Bronchodilator order(s) to one equivalent RT bronchodilator order with QID Frequency and an Albuterol order with Frequency of every 4 hours PRN wheezing or increased work of breathing using Per Protocol order mode. Greater than 13 - enter or revise RT Bronchodilator order(s) to one equivalent RT bronchodilator order with every 4 hours Frequency and an Albuterol order with Frequency of every 2 hours PRN wheezing or increased work of breathing using Per Protocol order mode. RT to enter RT Home Evaluation for COPD & MDI Assessment order using Per Protocol order mode.     Electronically signed by Ana Madden on 5/23/2022 at 1:30 PM

## 2022-05-23 NOTE — CARE COORDINATION
Discharge Planning Note:    Chart reviewed and it appears that patient has minimal needs for discharge at this time. Discussed with patients nurse and requested that case management be notified if discharge needs are identified.     - Current discharge plan is for the patient to return home with no needs. Case management will continue to follow progress and update discharge plan as needed.       Risk of Readmission Score: 5%    UMANG Vogt RN    [facility]  Phone: [phone]

## 2022-05-23 NOTE — ANESTHESIA POSTPROCEDURE EVALUATION
Department of Anesthesiology  Postprocedure Note    Patient: Howard Argueta  MRN: [id number]  YOB: 1995  Date of evaluation: 5/23/2022  Time:  11:41 AM     Procedure Summary     Date: 05/23/22 Room / Location: [address]    Anesthesia Start: 6110 Anesthesia Stop: 1114    Procedure: LAPAROSCOPIC SLEEVE GASTRECTOMY (N/A Abdomen) Diagnosis: (E66.01  MORBID OBESITY)    Surgeons: Jovita Sierra MD Responsible Provider: Susie Neumann MD    Anesthesia Type: general ASA Status: 3          Anesthesia Type: No value filed. Lisy Phase I: Lisy Score: 8    Lisy Phase II:      Last vitals: Reviewed and per EMR flowsheets.        Anesthesia Post Evaluation    Patient location during evaluation: PACU  Patient participation: complete - patient participated  Level of consciousness: awake and alert  Airway patency: patent  Nausea & Vomiting: no nausea and no vomiting  Complications: no  Cardiovascular status: hemodynamically stable  Respiratory status: acceptable  Hydration status: stable  Multimodal analgesia pain management approach

## 2022-05-23 NOTE — PROGRESS NOTES
Nursing care provided to prep patient for surgery. Patient lost 15 lbs. on pre-op diet, informed surgeon. Pre-op orders completed. Bilateral foot pneumatic sleeves applied. Teaching / education initiated regarding perioperative experience, post-op expectations and plan of care, and pain management during hospital stay. Patient verbalized understanding. The care plan and education has been reviewed and mutually agreed upon with the patient.

## 2022-05-23 NOTE — ANESTHESIA PRE PROCEDURE
Department of Anesthesiology  Preprocedure Note       Name:  Nahun Lacy   Age:  31 y.o.  :  1995                                          MRN:  [id number]         Date:  2022      Surgeon: Nadira Kenny): Dorys Calixto MD    Procedure: Procedure(s):  LAPAROSCOPIC SLEEVE GASTRECTOMY AND POSSIBLE OTHER INDICATED PROCEDURES    Medications prior to admission:   Prior to Admission medications    Medication Sig Start Date End Date Taking? Authorizing Provider   PROAIR  (47 Base) MCG/ACT inhaler TAKE 2 PUFFS BY MOUTH EVERY 6 HOURS AS NEEDED FOR WHEEZE 22   Abner Homans, APRN - CNP   cetirizine (ZYRTEC) 10 MG tablet TAKE 1 TABLET BY MOUTH EVERY DAY 22   Abner Homans, APRN - CNP   omeprazole (PRILOSEC) 20 MG delayed release capsule Take 1 capsule by mouth every morning (before breakfast) 22   Dorys Calixto MD   venlafaxine (EFFEXOR XR) 37.5 MG extended release capsule TAKE 1 CAPSULE BY MOUTH EVERY DAY  Patient taking differently: at bedtime TAKE 1 CAPSULE BY MOUTH EVERY DAY 3/1/22   Abner Homans, APRN - CNP   ferrous sulfate (IRON 325) 325 (65 Fe) MG tablet Take 1 tablet by mouth 2 times daily (with meals) 22   Abner Homans, APRN - CNP   Benzoyl Peroxide [facility]) 10 % external wash Apply topically 2 times daily.  21   Abner Homans, APRN - CNP   montelukast (SINGULAIR) 10 MG tablet TAKE 1 TABLET BY MOUTH EVERY DAY AT NIGHT 21   Abner Homans, APRN - CNP   fluticasone [facility]) 50 MCG/ACT nasal spray SHAKE LIQUID AND USE 1 SPRAY IN EACH NOSTRIL DAILY 19   Jaz Curiel MD       Current medications:    Current Facility-Administered Medications   Medication Dose Route Frequency Provider Last Rate Last Admin    ceFAZolin (ANCEF) 3000 mg in dextrose 5 % 100 mL IVPB  3,000 mg IntraVENous On Call to Lizeth Mao MD        lidocaine PF 1 % injection 0.5 mL  0.5 mL IntraDERmal Once Dorys Calixto MD        scopolamine (TRANSDERM-SCOP) transdermal patch 1 patch  1 patch TransDERmal Once Bijan Raymond MD   1 patch at 05/23/22 0757    enoxaparin Sodium (LOVENOX) injection 30 mg  30 mg SubCUTAneous Once Bijan Raymond MD        lactated ringers infusion   IntraVENous Continuous Bijan Raymond MD           Allergies:     Allergies   Allergen Reactions    Seasonal        Problem List:    Patient Active Problem List   Diagnosis Code    Asthma J45.909    Iron deficiency anemia W82.1    Lichen simplex J39.8    Menorrhagia with regular cycle N92.0    Generalized anxiety disorder F41.1    Seasonal allergic rhinitis due to pollen J30.1    Hidradenitis L73.2    Menorrhagia N92.0    Prediabetes R73.03    Obstructive sleep apnea G47.33    Morbid obesity with BMI of 45.0-49.9, adult (HCC) E66.01, Z68.42    Postprandial abdominal bloating R14.0    Chronic GERD K21.9    Elevated blood pressure reading R03.0    PVC (premature ventricular contraction) I49.3       Past Medical History:        Diagnosis Date    Allergic rhinitis     Anxiety     Asthma     Chronic GERD 11/30/2021    Diabetes mellitus ([facility] [address]     Hidradenitis     History of prediabetes 2012    Iron deficiency anemia     Menorrhagia     Migraine     Obesity     Obstructive sleep apnea     cpap    Pre-operative clearance 10/27/2020    Prediabetes 2/17/2020       Past Surgical History:        Procedure Laterality Date    CHOLECYSTECTOMY      TONSILLECTOMY  1998    UPPER GASTROINTESTINAL ENDOSCOPY N/A 5/6/2022    EGD BIOPSY performed by Bijan Raymond MD at [facility] ENDOSCOPY       Social History:    Social History     Tobacco Use    Smoking status: Never Smoker    Smokeless tobacco: Never Used   Substance Use Topics    Alcohol use: Not Currently     Alcohol/week: 1.0 standard drink     Types: 1 Glasses of wine per week     Comment: belongs to a wine-club-  (once a month )                                 Counseling given: Not Answered      Vital Signs (Current):   Vitals:    04/28/22 1306   Weight: 297 lb (134.7 kg)   Height: 5' 9\" (1.753 m)                                              BP Readings from Last 3 Encounters:   05/19/22 117/77   05/06/22 117/79   05/06/22 (!) 148/64       NPO Status: Time of last liquid consumption: 2300                        Time of last solid consumption: 2100                        Date of last liquid consumption: 05/22/22                        Date of last solid food consumption: 05/21/22    BMI:   Wt Readings from Last 3 Encounters:   04/28/22 297 lb (134.7 kg)   05/19/22 296 lb (134.3 kg)   05/06/22 294 lb (133.4 kg)     Body mass index is 43.86 kg/m².     CBC:   Lab Results   Component Value Date    WBC 6.9 05/03/2022    RBC 4.86 05/03/2022    RBC 4.87 02/14/2020    HGB 12.7 05/03/2022    HCT 38.7 05/03/2022    MCV 79.6 05/03/2022    RDW 14.1 05/03/2022     05/03/2022       CMP:   Lab Results   Component Value Date     05/03/2022    K 4.5 05/03/2022    K 3.8 11/13/2020     05/03/2022    CO2 21 05/03/2022    BUN 9 05/03/2022    CREATININE 0.7 05/03/2022    GFRAA >60 05/03/2022    AGRATIO 1.3 05/03/2022    LABGLOM >60 05/03/2022    GLUCOSE 84 05/03/2022    PROT 7.5 05/03/2022    CALCIUM 9.2 05/03/2022    BILITOT 0.9 05/03/2022    ALKPHOS 65 05/03/2022    AST 15 05/03/2022    ALT 18 05/03/2022       POC Tests:   Recent Labs     05/23/22  0755   POCGLU 107*       Coags:   Lab Results   Component Value Date    PROTIME 11.9 12/15/2021    INR 1.05 12/15/2021       HCG (If Applicable):   Lab Results   Component Value Date    PREGTESTUR Negative 05/23/2022        ABGs: No results found for: PHART, PO2ART, HKZ9XAW, JTG7TQY, BEART, H3QHVWCM     Type & Screen (If Applicable):  No results found for: LABABO, LABRH    Drug/Infectious Status (If Applicable):  No results found for: HIV, HEPCAB    COVID-19 Screening (If Applicable):   Lab Results   Component Value Date    COVID19 Not Detected 11/16/2020    COVID19 NOT DETECTED 09/25/2020           Anesthesia Evaluation  Patient summary reviewed and Nursing notes reviewed no history of anesthetic complications:   Airway: Mallampati: I  TM distance: >3 FB   Neck ROM: full  Mouth opening: > = 3 FB   Dental: normal exam         Pulmonary:   (+) sleep apnea: on CPAP,  asthma:     (-) shortness of breath                           Cardiovascular:        (-) hypertension and  angina                Neuro/Psych:   (+) headaches: migraine headaches, depression/anxiety    (-) CVA           GI/Hepatic/Renal:   (+) GERD:, morbid obesity     (-) liver disease       Endo/Other:    (+) DiabetesType II DM, , .    (-) hypothyroidism               Abdominal:             Vascular:     - PVD. Other Findings:           Anesthesia Plan      general     ASA 3       Induction: intravenous. MIPS: Postoperative opioids intended and Prophylactic antiemetics administered. Anesthetic plan and risks discussed with patient. Use of blood products discussed with patient whom. Plan discussed with CRNA.                   Pranav Liu MD   5/23/2022

## 2022-05-23 NOTE — PROGRESS NOTES
05/23/22 1330   RT Protocol   History Pulmonary Disease 2   Respiratory pattern 0   Breath sounds 0   Cough 0   Bronchodilator Assessment Score 2

## 2022-05-23 NOTE — PROGRESS NOTES
Phase 1 complete, pt seen by anesthesiologist. VSS, pt resting comfortably. Incision sites x5 are CDI, ice applied. Will transfer to [address] [facility], family updated.

## 2022-05-23 NOTE — PROGRESS NOTES
Pt arrived to PACU from OR, VSS, pt arouses to voice. Laparoscopic incisions x5 are CDI, ice applied. Will continue to monitor.

## 2022-05-23 NOTE — H&P
[facility]) Physicians   [facility]  [address] [place] [address], [address] [place] [id number] . Phone: [phone]  Fax: [phone]              Patient's History and Physical from May 19, 2022 was reviewed. Vijaya Callas seen and examined. There has been no change. HISTORY OF PRESENT ILLNESS:    Vijaya Lu is a very pleasant 31 y.o. with Body mass index is 41.79 kg/m². who is presenting for planned Laparoscopic Sleeve Gastrectomy for future weight loss. Past Medical History:        Diagnosis Date    Allergic rhinitis     Anxiety     Asthma     Chronic GERD 11/30/2021    Diabetes mellitus ([address]     Hidradenitis     History of prediabetes 2012    Iron deficiency anemia     Menorrhagia     Migraine     Obesity     Obstructive sleep apnea     cpap    Pre-operative clearance 10/27/2020    Prediabetes 2/17/2020     Past Surgical History:        Procedure Laterality Date    CHOLECYSTECTOMY      TONSILLECTOMY  1998    UPPER GASTROINTESTINAL ENDOSCOPY N/A 5/6/2022    EGD BIOPSY performed by Sylvia Glynn MD at [address] [place] [address]     Medications Prior to Admission:   Medications Prior to Admission: PROAIR  (90 Base) MCG/ACT inhaler, TAKE 2 PUFFS BY MOUTH EVERY 6 HOURS AS NEEDED FOR WHEEZE  cetirizine (ZYRTEC) 10 MG tablet, TAKE 1 TABLET BY MOUTH EVERY DAY  omeprazole (PRILOSEC) 20 MG delayed release capsule, Take 1 capsule by mouth every morning (before breakfast)  venlafaxine (EFFEXOR XR) 37.5 MG extended release capsule, TAKE 1 CAPSULE BY MOUTH EVERY DAY (Patient taking differently: at bedtime TAKE 1 CAPSULE BY MOUTH EVERY DAY)  ferrous sulfate (IRON 325) 325 (65 Fe) MG tablet, Take 1 tablet by mouth 2 times daily (with meals)  Benzoyl Peroxide (BENZAC AC) 10 % external wash, Apply topically 2 times daily.   montelukast (SINGULAIR) 10 MG tablet, TAKE 1 TABLET BY MOUTH EVERY DAY AT NIGHT  fluticasone (FLONASE) 50 MCG/ACT nasal spray, SHAKE LIQUID AND USE 1 SPRAY IN EACH NOSTRIL DAILY    Allergies:  Seasonal    Social History:   TOBACCO:   reports that she has never smoked. She has never used smokeless tobacco.  ETOH:   reports previous alcohol use of about 1.0 standard drink of alcohol per week. Family History:       Problem Relation Age of Onset    Other Mother         uterine fibroids    Diabetes Mother     Thyroid Disease Mother     Elevated Lipids Mother     Diabetes Father     Hypertension Father     Stroke Father 64    Sleep Apnea Father     Diabetes Other     Hypertension Other     Other Other         kidney disease (paternal uncle)    Other Maternal Grandmother         diverticulitis    Diabetes Maternal Grandmother     Other Paternal Grandmother         bone cancer     Heart Attack Paternal Grandmother     Colon Cancer Paternal Grandfather          REVIEW OF SYSTEMS:    Review of Systems - History obtained from the patient  General ROS: obesity  Psychological ROS: negative  Ophthalmic ROS: negative  Neurological ROS: negative  ENT ROS: negative  Allergy and Immunology ROS: negative  Hematological and Lymphatic ROS: negative  Endocrine ROS: negative  Breast ROS: negative  Respiratory ROS: negative  Cardiovascular ROS: negative  Gastrointestinal ROS:negative  Genito-Urinary ROS: negative  Musculoskeletal ROS: negative   Skin ROS: negative      PHYSICAL EXAM:      BP (!) 125/90   Pulse 102   Temp 97 °F (36.1 °C) (Temporal)   Resp 18   Ht 5' 9\" (1.753 m)   Wt 283 lb (128.4 kg)   LMP 04/11/2022   SpO2 98%   BMI 41.79 kg/m²  I        Physical Exam   Vitals Reviewed   Constitutional: Patient is oriented to person, place, and time. Patient appears well-developed and well-nourished. Patient is active and cooperative. Non-toxic appearance. No distress. HENT:   Head: Normocephalic and atraumatic. Head is without abrasion and without laceration. Hair is normal.   Right Ear: External ear normal. No lacerations. No drainage, swelling .    Left Ear: External ear normal. No lacerations. No drainage, swelling. Nose: Nose normal. No nose lacerations or nasal deformity. Eyes: Conjunctivae, EOM and lids are normal. Right eye exhibits no discharge. No foreign body present in the right eye. Left eye exhibits no discharge. No foreign body present in the left eye. No scleral icterus. Neck: Trachea normal and normal range of motion. No JVD present. Pulmonary/Chest: Effort normal. No accessory muscle usage or stridor. No apnea. No respiratory distress. Cardiovascular: Normal rate and no JVD. Abdominal: Normal appearance. Patient exhibits no distension. Musculoskeletal: Normal range of motion. Patient exhibits no edema. Neurological: Patient is alert and oriented to person, place, and time. Patient has normal strength. GCS eye subscore is 4. GCS verbal subscore is 5. GCS motor subscore is 6. Skin: Skin is warm and dry. No abrasion and no rash noted. Patient is not diaphoretic. No cyanosis or erythema. Psychiatric: Patient has a normal mood and affect.  Speech is normal and behavior is normal. Cognition and memory are normal.       DATA:  CBC:   Lab Results   Component Value Date    WBC 6.9 05/03/2022    RBC 4.86 05/03/2022    RBC 4.87 02/14/2020    HGB 12.7 05/03/2022    HCT 38.7 05/03/2022    MCV 79.6 05/03/2022    MCH 26.2 05/03/2022    MCHC 32.8 05/03/2022    RDW 14.1 05/03/2022     05/03/2022    MPV 8.1 05/03/2022     CMP:    Lab Results   Component Value Date     05/03/2022    K 4.5 05/03/2022    K 3.8 11/13/2020     05/03/2022    CO2 21 05/03/2022    BUN 9 05/03/2022    CREATININE 0.7 05/03/2022    GFRAA >60 05/03/2022    AGRATIO 1.3 05/03/2022    LABGLOM >60 05/03/2022    GLUCOSE 84 05/03/2022    PROT 7.5 05/03/2022    LABALBU 4.3 05/03/2022    CALCIUM 9.2 05/03/2022    BILITOT 0.9 05/03/2022    ALKPHOS 65 05/03/2022    AST 15 05/03/2022    ALT 18 05/03/2022       ASSESSMENT AND PLAN:      Sunnykiana Rui is a 31 y.o. female with Body mass index is 41.79 kg/m². ,  patient is deemed appropriate candidate for weight loss surgery by our multidisciplinary team.       Following The Metabolic and Bariatric Surgery Accreditation and Quality Improvement Program [facility]), and [employer] (ACS) recommendations, the Bariatric Surgical Risk/Benefit Calculator was used for Ross Thao. Report was discussed with Sánchez Merritt and patient wishes to proceed with surgery, fully understanding the risks and benefits. Of note, The [facility] Bariatric Surgical Risk/Benefit Calculator estimates the chance of an unfavorable outcome (such as a complication or death), the chance of remission of weight-related comorbidities, and the patient's BMI, weight change, and percent total weight change after surgery. These quantities are estimated based upon information the patient gives the healthcare provider about prior health history. The estimates are calculated using data from a large number of patients who had a primary bariatric surgical procedure similar to the one the patient may have. Please note the risk percentages, remission percentages, BMI, weight change, and percent total weight change provided to you by the risk/benefit calculator are only estimates. These estimates only take certain information into account. There may be other factors that are not included in the estimate which may increase or decrease the risk of a complication, the chance of remission of a weight-related comorbidity, or the amount of weight the patient loses. These estimates are not a guarantee of results. A complication after surgery may happen even if the risk is low, a weight-related comorbidity may not go into remission even if the chances are high, and a patient may lose more or less weight than predicted. This information is not intended to replace the advice of a doctor or healthcare provider about the diagnosis, treatment, or potential outcomes.  The Provider is not responsible for medical decisions that may be made by the patient based on the risk/benefit calculator estimates, since these estimates are provided for informational purposes. Patients should always consult their doctor or other health care provider before deciding on a treatment plan. Both open and laparoscopic approach were explained in details. Benefits and risks explained. Informed consent obtained. Risks including but not limited to; Infection, bleeding, gastric leak or obstruction, persistent nausea, vomiting, or reflux, injury to surrounding structures, risks of anesthesia, stricture, delayed gastric emptying, staple line leak, incisional hernia, malnutrition , hair loss, and/ or Conversion to Open surgery may be necessary. Failure to lose or maintain weight loss, Gallstones or Kidney Stones, Deep Venous Thrombosis , pulmonary embolism and / or death. With obesity and/or Fatty liver , I may elect to perform liver core biopsy to have  Baseline idea on liver pathology if there is abnormal Liver Functions or if there is hepatomegaly &/or lesion, risks include but not limited to bile leak, liver hematoma or failure to diagnose a condition. Gavin Hernandez understands that there may be a need to perform other urgent or necessary procedures that were unanticipated. Henrietta Kumar consent to the performance of any additional procedures determined during the original procedure to be in their best interests and where delay might cause additional harm or put patient at risk for additional anesthesia risk for required by a second procedure and that will be determined by the surgeon. I did explain thoroughly to the patient that compliance with pre- and post op diet and other recommendations are integral part to improve the chances of successful weight loss and also not following it could end with serious health complications.      We discussed that our goal is to ameliorate the medical problems and not to

## 2022-05-24 ENCOUNTER — APPOINTMENT (OUTPATIENT)
Dept: GENERAL RADIOLOGY | Age: 27
DRG: 403 | End: 2022-05-24
Attending: SURGERY
Payer: COMMERCIAL

## 2022-05-24 VITALS
BODY MASS INDEX: 41.92 KG/M2 | DIASTOLIC BLOOD PRESSURE: 96 MMHG | TEMPERATURE: 98.4 F | WEIGHT: 283 LBS | SYSTOLIC BLOOD PRESSURE: 149 MMHG | RESPIRATION RATE: 16 BRPM | HEIGHT: 69 IN | HEART RATE: 87 BPM | OXYGEN SATURATION: 99 %

## 2022-05-24 LAB
ANION GAP SERPL CALCULATED.3IONS-SCNC: 17 MMOL/L (ref 3–16)
BUN BLDV-MCNC: 4 MG/DL (ref 7–20)
CALCIUM SERPL-MCNC: 10 MG/DL (ref 8.3–10.6)
CHLORIDE BLD-SCNC: 101 MMOL/L (ref 99–110)
CO2: 18 MMOL/L (ref 21–32)
CREAT SERPL-MCNC: 0.5 MG/DL (ref 0.6–1.1)
GFR AFRICAN AMERICAN: >60
GFR NON-AFRICAN AMERICAN: >60
GLUCOSE BLD-MCNC: 110 MG/DL (ref 70–99)
GLUCOSE BLD-MCNC: 122 MG/DL (ref 70–99)
HCT VFR BLD CALC: 39.6 % (ref 36–48)
HEMOGLOBIN: 12.8 G/DL (ref 12–16)
MCH RBC QN AUTO: 26.1 PG (ref 26–34)
MCHC RBC AUTO-ENTMCNC: 32.4 G/DL (ref 31–36)
MCV RBC AUTO: 80.6 FL (ref 80–100)
PDW BLD-RTO: 14.8 % (ref 12.4–15.4)
PERFORMED ON: ABNORMAL
PLATELET # BLD: 322 K/UL (ref 135–450)
PMV BLD AUTO: 8.2 FL (ref 5–10.5)
POTASSIUM SERPL-SCNC: 4.1 MMOL/L (ref 3.5–5.1)
RBC # BLD: 4.91 M/UL (ref 4–5.2)
SODIUM BLD-SCNC: 136 MMOL/L (ref 136–145)
WBC # BLD: 13.9 K/UL (ref 4–11)

## 2022-05-24 PROCEDURE — 6360000002 HC RX W HCPCS: Performed by: SURGERY

## 2022-05-24 PROCEDURE — 6370000000 HC RX 637 (ALT 250 FOR IP): Performed by: SURGERY

## 2022-05-24 PROCEDURE — 80048 BASIC METABOLIC PNL TOTAL CA: CPT

## 2022-05-24 PROCEDURE — 36415 COLL VENOUS BLD VENIPUNCTURE: CPT

## 2022-05-24 PROCEDURE — C9113 INJ PANTOPRAZOLE SODIUM, VIA: HCPCS | Performed by: SURGERY

## 2022-05-24 PROCEDURE — 85027 COMPLETE CBC AUTOMATED: CPT

## 2022-05-24 PROCEDURE — 6360000002 HC RX W HCPCS: Performed by: NURSE PRACTITIONER

## 2022-05-24 PROCEDURE — 2580000003 HC RX 258: Performed by: SURGERY

## 2022-05-24 PROCEDURE — APPSS180 APP SPLIT SHARED TIME > 60 MINUTES: Performed by: NURSE PRACTITIONER

## 2022-05-24 PROCEDURE — 6370000000 HC RX 637 (ALT 250 FOR IP): Performed by: NURSE PRACTITIONER

## 2022-05-24 PROCEDURE — 99024 POSTOP FOLLOW-UP VISIT: CPT | Performed by: NURSE PRACTITIONER

## 2022-05-24 PROCEDURE — 6360000004 HC RX CONTRAST MEDICATION

## 2022-05-24 PROCEDURE — 74240 X-RAY XM UPR GI TRC 1CNTRST: CPT

## 2022-05-24 RX ORDER — OXYCODONE HYDROCHLORIDE AND ACETAMINOPHEN 5; 325 MG/1; MG/1
1 TABLET ORAL EVERY 4 HOURS PRN
Status: DISCONTINUED | OUTPATIENT
Start: 2022-05-24 | End: 2022-05-24 | Stop reason: HOSPADM

## 2022-05-24 RX ORDER — ONDANSETRON 4 MG/1
8 TABLET, ORALLY DISINTEGRATING ORAL EVERY 8 HOURS PRN
Status: DISCONTINUED | OUTPATIENT
Start: 2022-05-24 | End: 2022-05-24 | Stop reason: HOSPADM

## 2022-05-24 RX ORDER — OXYCODONE HYDROCHLORIDE AND ACETAMINOPHEN 5; 325 MG/1; MG/1
1 TABLET ORAL EVERY 6 HOURS PRN
Qty: 28 TABLET | Refills: 0 | Status: SHIPPED | OUTPATIENT
Start: 2022-05-24 | End: 2022-05-31

## 2022-05-24 RX ORDER — PROMETHAZINE HYDROCHLORIDE 6.25 MG/5ML
12.5 SYRUP ORAL EVERY 6 HOURS PRN
Status: DISCONTINUED | OUTPATIENT
Start: 2022-05-24 | End: 2022-05-24 | Stop reason: HOSPADM

## 2022-05-24 RX ORDER — ONDANSETRON 4 MG/1
8 TABLET, ORALLY DISINTEGRATING ORAL EVERY 8 HOURS PRN
Qty: 30 TABLET | Refills: 2 | Status: SHIPPED | OUTPATIENT
Start: 2022-05-24 | End: 2022-06-01

## 2022-05-24 RX ORDER — SIMETHICONE 80 MG
80 TABLET,CHEWABLE ORAL EVERY 6 HOURS PRN
Status: DISCONTINUED | OUTPATIENT
Start: 2022-05-24 | End: 2022-05-24 | Stop reason: HOSPADM

## 2022-05-24 RX ORDER — KETOROLAC TROMETHAMINE 30 MG/ML
15 INJECTION, SOLUTION INTRAMUSCULAR; INTRAVENOUS EVERY 6 HOURS
Status: COMPLETED | OUTPATIENT
Start: 2022-05-24 | End: 2022-05-24

## 2022-05-24 RX ORDER — ONDANSETRON 4 MG/1
8 TABLET, ORALLY DISINTEGRATING ORAL EVERY 8 HOURS PRN
Qty: 30 TABLET | Refills: 0 | Status: SHIPPED | OUTPATIENT
Start: 2022-05-24 | End: 2022-06-01

## 2022-05-24 RX ORDER — OXYCODONE HYDROCHLORIDE AND ACETAMINOPHEN 5; 325 MG/1; MG/1
2 TABLET ORAL EVERY 4 HOURS PRN
Status: DISCONTINUED | OUTPATIENT
Start: 2022-05-24 | End: 2022-05-24 | Stop reason: HOSPADM

## 2022-05-24 RX ADMIN — METOCLOPRAMIDE 10 MG: 5 INJECTION, SOLUTION INTRAMUSCULAR; INTRAVENOUS at 05:44

## 2022-05-24 RX ADMIN — KETOROLAC TROMETHAMINE 15 MG: 30 INJECTION, SOLUTION INTRAMUSCULAR at 10:30

## 2022-05-24 RX ADMIN — SODIUM CHLORIDE, PRESERVATIVE FREE 10 ML: 5 INJECTION INTRAVENOUS at 09:19

## 2022-05-24 RX ADMIN — SODIUM CHLORIDE, PRESERVATIVE FREE 40 MG: 5 INJECTION INTRAVENOUS at 09:16

## 2022-05-24 RX ADMIN — SIMETHICONE 80 MG: 80 TABLET, CHEWABLE ORAL at 10:29

## 2022-05-24 RX ADMIN — IOHEXOL 50 ML: 350 INJECTION, SOLUTION INTRAVENOUS at 08:33

## 2022-05-24 RX ADMIN — OXYCODONE AND ACETAMINOPHEN 1 TABLET: 5; 325 TABLET ORAL at 13:37

## 2022-05-24 RX ADMIN — ENOXAPARIN SODIUM 30 MG: 100 INJECTION SUBCUTANEOUS at 09:15

## 2022-05-24 ASSESSMENT — PAIN DESCRIPTION - LOCATION
LOCATION: ABDOMEN

## 2022-05-24 ASSESSMENT — PAIN SCALES - GENERAL
PAINLEVEL_OUTOF10: 9
PAINLEVEL_OUTOF10: 7
PAINLEVEL_OUTOF10: 8

## 2022-05-24 ASSESSMENT — PAIN DESCRIPTION - DESCRIPTORS
DESCRIPTORS: DISCOMFORT
DESCRIPTORS: DISCOMFORT;SORE
DESCRIPTORS: DISCOMFORT

## 2022-05-24 ASSESSMENT — PAIN DESCRIPTION - ORIENTATION
ORIENTATION: MID

## 2022-05-24 NOTE — PROGRESS NOTES
Shift assessment completed. VSS. Pt A&O. On CPAP. IVF on flow. Meds given as per MAR. PCA pump in place. Pt ambulated in hallway. Voiding. Abd binder in place. Call light within reach. Will continue to monitor.

## 2022-05-24 NOTE — PROGRESS NOTES
[facility]) Physicians   General & Laparoscopic Surgery  [facility]       Pt seen and examined    S/p laparoscopic sleeve gastrectomy     The patient is ambulating in lopez. Pain is well controlled. There is some nausea, but no vomiting. There are no other complaints or questions. Vitals:    05/24/22 0315 05/24/22 0432 05/24/22 0515 05/24/22 0907   BP: (!) 142/88  (!) 142/85 (!) 131/90   Pulse: 87  94 99   Resp: 18 16 16 16   Temp:   98.4 °F (36.9 °C) 98.2 °F (36.8 °C)   TempSrc:   Oral Oral   SpO2: 99% 98% 99% 100%   Weight:       Height:         Abdomen is soft, appropriately tender, incisions stable with no erythema. Breath sounds are clear bilaterally. Bowel sounds are hypoactive in all quadrants.     Data  CBC:   Lab Results   Component Value Date    WBC 13.9 05/24/2022    RBC 4.91 05/24/2022    RBC 4.87 02/14/2020    HGB 12.8 05/24/2022    HCT 39.6 05/24/2022    MCV 80.6 05/24/2022    MCH 26.1 05/24/2022    MCHC 32.4 05/24/2022    RDW 14.8 05/24/2022     05/24/2022    MPV 8.2 05/24/2022     BMP:    Lab Results   Component Value Date     05/24/2022    K 4.1 05/24/2022    K 3.8 11/13/2020     05/24/2022    CO2 18 05/24/2022    BUN 4 05/24/2022    LABALBU 4.3 05/03/2022    CREATININE 0.5 05/24/2022    CALCIUM 10.0 05/24/2022    GFRAA >60 05/24/2022    LABGLOM >60 05/24/2022    GLUCOSE 122 05/24/2022     Current Inpatient Medications    Current Facility-Administered Medications: albuterol sulfate  (90 Base) MCG/ACT inhaler 2 puff, 2 puff, Inhalation, Q6H PRN  pantoprazole (PROTONIX) 40 mg in sodium chloride (PF) 10 mL injection, 40 mg, IntraVENous, Daily  lidocaine 4 % external patch 1 patch, 1 patch, TransDERmal, Daily  labetalol (NORMODYNE;TRANDATE) injection 10 mg, 10 mg, IntraVENous, Q2H PRN  hyoscyamine (LEVSIN) 500 MCG/ML injection 250 mcg, 250 mcg, IntraVENous, Q4H PRN  hydrALAZINE (APRESOLINE) injection 10 mg, 10 mg, IntraVENous, Q2H PRN  diphenhydrAMINE (BENADRYL) injection 12.5 mg, 12.5 mg, IntraVENous, Q6H PRN  naloxone 0.4 mg in 10 mL sodium chloride syringe, , IntraVENous, PRN  lactated ringers infusion, , IntraVENous, Continuous  sodium chloride flush 0.9 % injection 5-40 mL, 5-40 mL, IntraVENous, 2 times per day  sodium chloride flush 0.9 % injection 5-40 mL, 5-40 mL, IntraVENous, PRN  0.9 % sodium chloride infusion, , IntraVENous, PRN  HYDROmorphone HCl PF (DILAUDID) injection 0.5 mg, 0.5 mg, IntraVENous, Q3H PRN  ondansetron (ZOFRAN) injection 4 mg, 4 mg, IntraVENous, Q6H PRN  [START ON 5/26/2022] scopolamine (TRANSDERM-SCOP) transdermal patch 1 patch, 1 patch, TransDERmal, Q72H  promethazine (PHENERGAN) injection 6.25 mg, 6.25 mg, IntraMUSCular, Q6H PRN  metoclopramide (REGLAN) injection 10 mg, 10 mg, IntraVENous, Q6H PRN  enoxaparin Sodium (LOVENOX) injection 30 mg, 30 mg, SubCUTAneous, 2 times per day  HYDROmorphone HCl (DILAUDID) 0.2 mg/mL PCA 50 mL, , IntraVENous, Continuous    Patient Active Problem List   Diagnosis    Asthma    Iron deficiency anemia    Lichen simplex    Menorrhagia with regular cycle    Generalized anxiety disorder    Seasonal allergic rhinitis due to pollen    Hidradenitis    Menorrhagia    Prediabetes    Obstructive sleep apnea    Morbid obesity with BMI of 45.0-49.9, adult ([facility])    Postprandial abdominal bloating    Chronic GERD    Elevated blood pressure reading    PVC (premature ventricular contraction)    Morbid obesity with BMI of 40.0-44.9, adult ([facility] [address]    S/P laparoscopic sleeve gastrectomy     A/P  Veronique Lema is a 31 y.o. female pod#1, s/p laparoscopic sleeve gastrectomy. Stable overall. UGI with no leak/obstruction, will start on Phase I diet. Patient has voided postoperatively and urine output is WNL. Will continue to monitor. Will discontinue PCA and start oral pain medications. Will order one dose of IIV Toradol. Will order simethicone PRN for gas pain/cramping.   Patient will continue to wear abdominal binder when walking for support. Patient needs to ambulate in the lopez every 60-90 minutes. Patient needs to utilize incentive spirometer 10 times per hour while awake. Patient will continue icing incisions. Plan for discharge today if nausea remains controlled, patient continues to void and is tolerating Phase I diet. Discussed with Dr. Judit Johnson and Nursing Staff.     Jackqulyn Bloch, JASSON-C

## 2022-05-24 NOTE — PLAN OF CARE
Problem: Chronic Conditions and Co-morbidities  Goal: Patient's chronic conditions and co-morbidity symptoms are monitored and maintained or improved  5/24/2022 0218 by Violette Sarmiento RN  Outcome: Progressing  5/23/2022 2134 by Abraham Matias RN  Outcome: Progressing  5/23/2022 2133 by Abraham Matias RN  Outcome: Progressing  Flowsheets (Taken 5/23/2022 1245)  Care Plan - Patient's Chronic Conditions and Co-Morbidity Symptoms are Monitored and Maintained or Improved: Update acute care plan with appropriate goals if chronic or comorbid symptoms are exacerbated and prevent overall improvement and discharge     Problem: Discharge Planning  Goal: Discharge to home or other facility with appropriate resources  5/24/2022 0218 by Violette Sarmiento RN  Outcome: Progressing  5/23/2022 2134 by Abraham Matias RN  Outcome: Progressing  5/23/2022 2133 by Abraham Matias RN  Outcome: Progressing  Flowsheets (Taken 5/23/2022 1245)  Discharge to home or other facility with appropriate resources: Identify discharge learning needs (meds, wound care, etc)     Problem: Pain  Goal: Verbalizes/displays adequate comfort level or baseline comfort level  5/24/2022 0218 by Violette Sarmiento RN  Outcome: Progressing  5/23/2022 2134 by Abraham Matias RN  Outcome: Progressing  5/23/2022 2133 by Abraham Matias RN  Outcome: Progressing  Flowsheets  Taken 5/23/2022 1630  Verbalizes/displays adequate comfort level or baseline comfort level:   Encourage patient to monitor pain and request assistance   Assess pain using appropriate pain scale   Implement non-pharmacological measures as appropriate and evaluate response  Taken 5/23/2022 1230  Verbalizes/displays adequate comfort level or baseline comfort level:   Encourage patient to monitor pain and request assistance   Assess pain using appropriate pain scale   Implement non-pharmacological measures as appropriate and evaluate response   Notify Licensed Independent Practitioner if interventions unsuccessful or patient reports new pain     Problem: Respiratory - Adult  Goal: Achieves optimal ventilation and oxygenation  5/24/2022 0218 by Sarah Esquivel RN  Outcome: Progressing  5/23/2022 2134 by Ortega Horton RN  Outcome: Progressing  5/23/2022 2133 by Ortega Horton RN  Outcome: Progressing  Flowsheets (Taken 5/23/2022 1245)  Achieves optimal ventilation and oxygenation:   Assess for changes in respiratory status   Assess for changes in mentation and behavior   Position to facilitate oxygenation and minimize respiratory effort   Oxygen supplementation based on oxygen saturation or arterial blood gases   Respiratory therapy support as indicated     Problem: Skin/Tissue Integrity - Adult  Goal: Skin integrity remains intact  5/24/2022 0218 by Sarah Esquivel RN  Outcome: Progressing  5/23/2022 2134 by Ortega Horton RN  Outcome: Progressing  5/23/2022 2133 by Ortega Horton RN  Outcome: Progressing  Flowsheets (Taken 5/23/2022 1245)  Skin Integrity Remains Intact: Every 4-6 hours: If on nasal continuous positive airway pressure, respiratory therapy assesses nares and determine need for appliance change or resting period  Goal: Incisions, wounds, or drain sites healing without S/S of infection  5/24/2022 0218 by Sarah Esquivel RN  Outcome: Progressing  5/23/2022 2134 by Ortega Horton RN  Outcome: Progressing  5/23/2022 2133 by Ortega Horton RN  Outcome: Progressing  Flowsheets (Taken 5/23/2022 1245)  Incisions, Wounds, or Drain Sites Healing Without Sign and Symptoms of Infection:   TWICE DAILY: Assess and document skin integrity   TWICE DAILY: Assess and document dressing/incision, wound bed, drain sites and surrounding tissue     Problem: Gastrointestinal - Adult  Goal: Minimal or absence of nausea and vomiting  5/24/2022 0218 by Sarah Esquivel RN  Outcome: Progressing  5/23/2022 2134 by Ortega Horton RN  Outcome: Progressing  5/23/2022 2133 by Ortega Horton RN  Outcome: Progressing  Flowsheets (Taken 5/23/2022 1245)  Minimal or absence of nausea and vomiting:   Administer IV fluids as ordered to ensure adequate hydration   Maintain NPO status until nausea and vomiting are resolved   Administer ordered antiemetic medications as needed     Problem: Genitourinary - Adult  Goal: Absence of urinary retention  5/24/2022 0218 by Savanna Gandara RN  Outcome: Progressing  5/23/2022 2134 by Kenia Rachel RN  Outcome: Progressing  5/23/2022 2133 by Kenia Rachel RN  Outcome: Progressing  Flowsheets (Taken 5/23/2022 1245)  Absence of urinary retention:   Assess patients ability to void and empty bladder   Monitor intake/output and perform bladder scan as needed

## 2022-05-24 NOTE — DISCHARGE SUMMARY
The patient was admitted on day of surgery and underwent a laparoscopic sleeve gastrectomy. Surgery went well and the patient went to our post-op bariatric floor. Overnight, the patient had stable vitals signs, good urine output and ambulated in the halls. On POD #1 the patient underwent an UGI which revealed no leak or obstruction. Phase I diet was started and the patient tolerated well. The patient has no N/V, tolerating diet, ambulating and pain controlled on oral medication. The patient was discharged home today in stable condition. The patient will f/u in 2 weeks and was given dietary and ambulation instruction. The patient was instructed to call if there are any questions or concerns. Pathology report reviewed with patient.          Patient Active Problem List   Diagnosis    Asthma    Iron deficiency anemia    Lichen simplex    Menorrhagia with regular cycle    Generalized anxiety disorder    Seasonal allergic rhinitis due to pollen    Hidradenitis    Menorrhagia    Prediabetes    Obstructive sleep apnea    Morbid obesity with BMI of 45.0-49.9, adult (HCC)    Postprandial abdominal bloating    Chronic GERD    Elevated blood pressure reading    PVC (premature ventricular contraction)    Morbid obesity with BMI of 40.0-44.9, adult (HCC)    S/P laparoscopic sleeve gastrectomy

## 2022-05-24 NOTE — PROGRESS NOTES
CLINICAL PHARMACY NOTE: MEDS TO BEDS    Total # of Prescriptions Filled: 2   The following medications were delivered to the patient:  · Zofran 8 mg  · Percocet 5-325 mg    Additional Documentation:    Delivered to Patient=signed  Ok to be delivered per [facility] Som TALAVERA

## 2022-05-24 NOTE — PLAN OF CARE
Problem: Chronic Conditions and Co-morbidities  Goal: Patient's chronic conditions and co-morbidity symptoms are monitored and maintained or improved  5/24/2022 1055 by Omar Cuellar RN  Outcome: Progressing  5/24/2022 0218 by Juani Wahl RN  Outcome: Progressing  5/23/2022 2134 by Muriel Murray RN  Outcome: Progressing  5/23/2022 2133 by Muriel Murray RN  Outcome: Progressing  Flowsheets (Taken 5/23/2022 1245)  Care Plan - Patient's Chronic Conditions and Co-Morbidity Symptoms are Monitored and Maintained or Improved: Update acute care plan with appropriate goals if chronic or comorbid symptoms are exacerbated and prevent overall improvement and discharge     Problem: Discharge Planning  Goal: Discharge to home or other facility with appropriate resources  5/24/2022 1055 by Omar Cuellar RN  Outcome: Progressing  5/24/2022 0218 by Juani Wahl RN  Outcome: Progressing  5/23/2022 2134 by Muriel Murray RN  Outcome: Progressing  5/23/2022 2133 by Muriel Murray RN  Outcome: Progressing  Flowsheets (Taken 5/23/2022 1245)  Discharge to home or other facility with appropriate resources: Identify discharge learning needs (meds, wound care, etc)     Problem: Pain  Goal: Verbalizes/displays adequate comfort level or baseline comfort level  5/24/2022 1055 by Omar Cuellar RN  Outcome: Progressing  5/24/2022 0218 by Juani Wahl RN  Outcome: Progressing  5/23/2022 2134 by Muriel Murray RN  Outcome: Progressing  5/23/2022 2133 by Muriel Murray RN  Outcome: Progressing  Flowsheets  Taken 5/23/2022 1630  Verbalizes/displays adequate comfort level or baseline comfort level:   Encourage patient to monitor pain and request assistance   Assess pain using appropriate pain scale   Implement non-pharmacological measures as appropriate and evaluate response  Taken 5/23/2022 1230  Verbalizes/displays adequate comfort level or baseline comfort level:   Encourage patient to monitor pain and request assistance   Assess pain using appropriate pain scale   Implement non-pharmacological measures as appropriate and evaluate response   Notify Licensed Independent Practitioner if interventions unsuccessful or patient reports new pain     Problem: Respiratory - Adult  Goal: Achieves optimal ventilation and oxygenation  5/24/2022 1055 by Omar Cuellar RN  Outcome: Progressing  5/24/2022 0218 by Juani Wahl RN  Outcome: Progressing  5/23/2022 2134 by Muriel Murray RN  Outcome: Progressing  5/23/2022 2133 by Muriel Murray RN  Outcome: Progressing  Flowsheets (Taken 5/23/2022 1245)  Achieves optimal ventilation and oxygenation:   Assess for changes in respiratory status   Assess for changes in mentation and behavior   Position to facilitate oxygenation and minimize respiratory effort   Oxygen supplementation based on oxygen saturation or arterial blood gases   Respiratory therapy support as indicated     Problem: Skin/Tissue Integrity - Adult  Goal: Skin integrity remains intact  5/24/2022 1055 by Omar Cuellar RN  Outcome: Progressing  5/24/2022 0218 by Juani Wahl RN  Outcome: Progressing  5/23/2022 2134 by Muriel Murray RN  Outcome: Progressing  5/23/2022 2133 by Muriel Murray RN  Outcome: Progressing  Flowsheets (Taken 5/23/2022 1245)  Skin Integrity Remains Intact: Every 4-6 hours: If on nasal continuous positive airway pressure, respiratory therapy assesses nares and determine need for appliance change or resting period  Goal: Incisions, wounds, or drain sites healing without S/S of infection  5/24/2022 1055 by Omar Cuellar RN  Outcome: Progressing  5/24/2022 0218 by uJani Wahl RN  Outcome: Progressing  5/23/2022 2134 by Muriel Murray RN  Outcome: Progressing  5/23/2022 2133 by Muriel Murray RN  Outcome: Progressing  Flowsheets (Taken 5/23/2022 1245)  Incisions, Wounds, or Drain Sites Healing Without Sign and Symptoms of Infection:   TWICE DAILY: Assess and document skin integrity   TWICE DAILY: Assess and document dressing/incision, wound bed, drain sites and surrounding tissue     Problem: Gastrointestinal - Adult  Goal: Minimal or absence of nausea and vomiting  5/24/2022 1055 by Jen Knox RN  Outcome: Progressing  5/24/2022 0218 by Dinah Herrera RN  Outcome: Progressing  5/23/2022 2134 by Giovany Lamar RN  Outcome: Progressing  5/23/2022 2133 by Giovany Lamar RN  Outcome: Progressing  Flowsheets (Taken 5/23/2022 1245)  Minimal or absence of nausea and vomiting:   Administer IV fluids as ordered to ensure adequate hydration   Maintain NPO status until nausea and vomiting are resolved   Administer ordered antiemetic medications as needed     Problem: Genitourinary - Adult  Goal: Absence of urinary retention  5/24/2022 1055 by Jen Knox RN  Outcome: Progressing  Flowsheets (Taken 5/24/2022 0907)  Absence of urinary retention:   Assess patients ability to void and empty bladder   Monitor intake/output and perform bladder scan as needed  5/24/2022 0218 by Dinah Herrera RN  Outcome: Progressing  5/23/2022 2134 by Giovany Lamar RN  Outcome: Progressing  5/23/2022 2133 by Giovany Lamar RN  Outcome: Progressing  Flowsheets (Taken 5/23/2022 1245)  Absence of urinary retention:   Assess patients ability to void and empty bladder   Monitor intake/output and perform bladder scan as needed

## 2022-05-24 NOTE — PLAN OF CARE
Problem: Chronic Conditions and Co-morbidities  Goal: Patient's chronic conditions and co-morbidity symptoms are monitored and maintained or improved  5/23/2022 2134 by Marlow Phoenix, RN  Outcome: Progressing  5/23/2022 2133 by Marlow Phoenix, RN  Outcome: Progressing  Flowsheets (Taken 5/23/2022 1245)  Care Plan - Patient's Chronic Conditions and Co-Morbidity Symptoms are Monitored and Maintained or Improved: Update acute care plan with appropriate goals if chronic or comorbid symptoms are exacerbated and prevent overall improvement and discharge     Problem: Discharge Planning  Goal: Discharge to home or other facility with appropriate resources  5/23/2022 2134 by Marlow Phoenix, RN  Outcome: Progressing  5/23/2022 2133 by Marlow Phoenix, RN  Outcome: Progressing  Flowsheets (Taken 5/23/2022 1245)  Discharge to home or other facility with appropriate resources: Identify discharge learning needs (meds, wound care, etc)     Problem: Pain  Goal: Verbalizes/displays adequate comfort level or baseline comfort level  5/23/2022 2134 by Marlow Phoenix, RN  Outcome: Progressing  5/23/2022 2133 by Marlow Phoenix, RN  Outcome: Progressing  Flowsheets  Taken 5/23/2022 1630  Verbalizes/displays adequate comfort level or baseline comfort level:   Encourage patient to monitor pain and request assistance   Assess pain using appropriate pain scale   Implement non-pharmacological measures as appropriate and evaluate response  Taken 5/23/2022 1230  Verbalizes/displays adequate comfort level or baseline comfort level:   Encourage patient to monitor pain and request assistance   Assess pain using appropriate pain scale   Implement non-pharmacological measures as appropriate and evaluate response   Notify Licensed Independent Practitioner if interventions unsuccessful or patient reports new pain     Problem: Respiratory - Adult  Goal: Achieves optimal ventilation and oxygenation  5/23/2022 2134 by Marlow Phoenix, RN  Outcome: Progressing  5/23/2022 2133 by Seble Francis RN  Outcome: Progressing  Flowsheets (Taken 5/23/2022 1245)  Achieves optimal ventilation and oxygenation:   Assess for changes in respiratory status   Assess for changes in mentation and behavior   Position to facilitate oxygenation and minimize respiratory effort   Oxygen supplementation based on oxygen saturation or arterial blood gases   Respiratory therapy support as indicated     Problem: Skin/Tissue Integrity - Adult  Goal: Skin integrity remains intact  5/23/2022 2134 by Seble Francis RN  Outcome: Progressing  5/23/2022 2133 by Seble Francis RN  Outcome: Progressing  Flowsheets (Taken 5/23/2022 1245)  Skin Integrity Remains Intact: Every 4-6 hours: If on nasal continuous positive airway pressure, respiratory therapy assesses nares and determine need for appliance change or resting period  Goal: Incisions, wounds, or drain sites healing without S/S of infection  5/23/2022 2134 by Seble Francis RN  Outcome: Progressing  5/23/2022 2133 by Seble Francis RN  Outcome: Progressing  Flowsheets (Taken 5/23/2022 1245)  Incisions, Wounds, or Drain Sites Healing Without Sign and Symptoms of Infection:   TWICE DAILY: Assess and document skin integrity   TWICE DAILY: Assess and document dressing/incision, wound bed, drain sites and surrounding tissue     Problem: Gastrointestinal - Adult  Goal: Minimal or absence of nausea and vomiting  5/23/2022 2134 by Seble Francis RN  Outcome: Progressing  5/23/2022 2133 by Seble Francis RN  Outcome: Progressing  Flowsheets (Taken 5/23/2022 1245)  Minimal or absence of nausea and vomiting:   Administer IV fluids as ordered to ensure adequate hydration   Maintain NPO status until nausea and vomiting are resolved   Administer ordered antiemetic medications as needed     Problem: Genitourinary - Adult  Goal: Absence of urinary retention  5/23/2022 2134 by Seble Francis RN  Outcome: Progressing  5/23/2022 2133 by Seble Francis RN  Outcome: Progressing  Flowsheets (Taken 5/23/2022 1245)  Absence of urinary retention:   Assess patients ability to void and empty bladder   Monitor intake/output and perform bladder scan as needed

## 2022-05-24 NOTE — PROGRESS NOTES
Brought to floor. Bedside report given - 5 abd lap sites, CDI. Rates pain 4/10. Reports severe nausea. PRN reglan given. Bilateral foot pumps on. Bradypnea noted, Respiratory notified that will get nausea under control and then place on CPAP (pt has hx of sleep apnea). When she falls asleep her respirations drop. Sating very well on 2 L. Reviewed POC & meds with patient &  who is at bedside. No questions at this time. The care plan and education has been reviewed and mutually agreed upon with the patient.

## 2022-05-24 NOTE — PROGRESS NOTES
Patient provided with discharge instructions, discussed in detail, new medications reviewed including use and side effects. Patient verbalized understanding. Prescriptions filled per outpatient pharmacy. All questions answered, family at the bedside to transport home. Patient discharged home.

## 2022-05-24 NOTE — PROGRESS NOTES
Shift assessment completed. VSS. Patient alert and oriented x 4. Lap sites x 5 to abdomen clean, dry and intact. Ambulates in hallway often. Wears abdominal binder when ambulating. The care plan and education have been reviewed and mutually agreed upon with the patient. Call light within reach.

## 2022-05-25 ENCOUNTER — PATIENT MESSAGE (OUTPATIENT)
Dept: BARIATRICS/WEIGHT MGMT | Age: 27
End: 2022-05-25

## 2022-05-25 DIAGNOSIS — K31.89 SPASM OF GI TRACT: ICD-10-CM

## 2022-05-25 DIAGNOSIS — Z98.84 S/P LAPAROSCOPIC SLEEVE GASTRECTOMY: Primary | ICD-10-CM

## 2022-05-25 RX ORDER — HYOSCYAMINE SULFATE 0.125 MG
125 TABLET ORAL EVERY 4 HOURS PRN
Qty: 24 TABLET | Refills: 2 | Status: SHIPPED | OUTPATIENT
Start: 2022-05-25 | End: 2022-06-01

## 2022-05-25 NOTE — TELEPHONE ENCOUNTER
Called pt to discuss concerns. She reports pain and nausea with sips which leads to sensation to belch that then gets \"stuck. \" Only had ~12 oz water yesterday and 8 oz so far today. Taking zofran and prilosec,crushed percocet caused more nausea. Suggested mixed crushed meds w/ flavored liquid vs water and option of liquid tylenol. Wearing binder and walking around house/outside regularly. Suggested loosening binder while sipping and tighten when up and moving. Encouraged pt to use 1 oz medicine cups to sip slowly every 10 minutes. Also suggested change in temperature (warm tea or broth) or flavor (sf lemonade). Instructed pt to call the office with an update tomorrow. Pt verbalized understanding and has no further questions at this time.

## 2022-05-25 NOTE — TELEPHONE ENCOUNTER
Called and spoke with patient this evening. Is doing better with fluids since talking with the dietitian and has finished her second line on the oral intake sheet which means she is at 22 oz so far. Discussed symptoms and could be related to having some spasms as well from maybe sipping to quickly. Also explained that drinking to fast can cause some belching as well. Will send in a prescription for Levsin that she can take as needed for spasms. Verified patient's pharmacy and sent prescription. Discussed that she can also cut her pain medication in half or quarter it and take one piece at a time. Also patients often tolerate pain medication more when they start back on their protein shakes. In reference to bowel movements I discussed that it could take 5-6 more days before she has one as she is not consuming much more then fluids. Patient stated she has passed more gas then earlier and is taking some Gas-X which is helping. Asked patient to call tomorrow or send a my chart message in to update us on how she is doing. Patient verbalized understanding.   Thank you,  THELMA VelizC

## 2022-05-26 ENCOUNTER — TELEPHONE (OUTPATIENT)
Dept: PRIMARY CARE CLINIC | Age: 27
End: 2022-05-26

## 2022-05-26 ENCOUNTER — PATIENT MESSAGE (OUTPATIENT)
Dept: BARIATRICS/WEIGHT MGMT | Age: 27
End: 2022-05-26

## 2022-05-26 DIAGNOSIS — Z98.84 S/P LAPAROSCOPIC SLEEVE GASTRECTOMY: Primary | ICD-10-CM

## 2022-05-26 NOTE — TELEPHONE ENCOUNTER
[facility] Initial Follow Up Call    Outreach made within 2 business days of discharge: Yes    Patient: Bhargavi Munroe Patient : 1995   MRN: [id number]  Reason for Admission: There are no discharge diagnoses documented for the most recent discharge. Discharge Date: 22       Spoke with: patient    Discharge department/facility: [facility]    TCM Interactive Patient Contact:  Was patient able to fill all prescriptions: Yes  Was patient instructed to bring all medications to the follow-up visit: Yes  Is patient taking all medications as directed in the discharge summary?  Yes  Does patient understand their discharge instructions: Yes  Does patient have questions or concerns that need addressed prior to 7-14 day follow up office visit: no    Scheduled appointment with PCP within 7-14 days    Follow Up  Future Appointments   Date Time Provider Lizeth Ruvalcaba   2022  1:00 PM CASPER Sandoval - JOSE DANIEL [facility] [place] - [facility]   2022 10:30 AM Arnel Phelps MD [facility]   1/3/2023  1:00 PM Jessica Eng MD [facility]       Patricio Salter

## 2022-05-27 RX ORDER — METOCLOPRAMIDE 10 MG/1
10 TABLET ORAL 2 TIMES DAILY
Qty: 28 TABLET | Refills: 0 | Status: SHIPPED | OUTPATIENT
Start: 2022-05-27 | End: 2022-06-01

## 2022-05-27 NOTE — TELEPHONE ENCOUNTER
From: Chrystal Lyons  To: Heather Sellers  Sent: 5/26/2022 9:52 PM EDT  Subject: Symptom Question     Suleman Ramirez, I hope all is well. Just wanted to  Update you. I have had my first bowel movement today (really just a lot of liquid) but I felt some relief. The only concern that I have is the discomfort I feel throughout the day rather Im drinking or not it feels like a small ball that wants to come up and it sits right on the center of my stomach. After I drink it will cause a 2 second discomfort pain and then Ill burp just a little. Is this normal ? Should I expect this long term ? Im really trying to hit my liquid intake but I will say these small incidents throughout the entire day does affect that for me.

## 2022-05-31 ENCOUNTER — TELEPHONE (OUTPATIENT)
Dept: BARIATRICS/WEIGHT MGMT | Age: 27
End: 2022-05-31

## 2022-05-31 NOTE — TELEPHONE ENCOUNTER
Surgery Type: sleeve    Surgery Date: 5/23/22    Surgeon: Dr. Rada Garcia    I attempted to contact the patient to follow up with them regarding their recent surgery. I have left a voicemail for the patient to return our call. If she calls back this morning, please forward call to me or send message to me. If she calls back after this morning, please place message to myself and the dieticians. Thanks!

## 2022-05-31 NOTE — TELEPHONE ENCOUNTER
Looks like she is doing better. Would recommend continuing to take Levsin and Reglan scheduled to help with motility and spasms. Agree with dietitians recommendations.   Thank you,  THELMA VelizC

## 2022-05-31 NOTE — TELEPHONE ENCOUNTER
Surgery Type: sleeve    Surgery Date: 5/23/22    Surgeon: Dr. Dockery Marking    The patient was contacted to follow up on their recent bariatric surgery. The following topics were reviewed:    [x] Hydration is Adequate- at least 44oz fluid, water / CL            --Patient is getting at least 48-64 oz of fluids a day, not including protein shakes. []Consuming Adequate Protein- mostly just 1 per day, struggling to get 2 just because she is over them         []Consuming 2 protein shakes a day                []Consuming 60-80 grams of protein a day    [] Food intake is appropriate- adding protein powder to pureed foods / tried the ricotta bake & chicken salad  [x]Adequately pureeing foods, so that there are no chunks left. [x]Taking in 1-2 oz at a time  [] Eating 4-6 times a day- eating 2x/day  [x] Following the 30-30-30 rule  [x] Reminded patient to keep food diary to bring to their 2 week follow up appointment. [] Pain relief techniques utilized  [] Taking pain medication as prescribed- taking as needed  [] Utilizing Lidoderm patches (if prescribed)  [x]Taking Tylenol instead of prescription pain medication, has used but not routinely, hasn't used it in 2 days  [x] Wearing abdominal binder  [] Using ice for incisional pain- stopped using ice about 3-4 days ago    [] Activity is appropriate  [x] Walking 10 minutes out of every hour  [x]Avoiding heavy lifting (>10lbs)  [x] Utilizing their incentive spirometer    [] Issues with Nausea/Vomiting/Reflux- pt feels an air bubble/balloon in stomach trying to come up   [] Using Zofran PRN for nausea/vomiting   [x]Taking Prilosec for reflux & gas-x    [x] Issues with Constipation- BM last Wed/Thurs, discussed colace/miralax as needed  []Tried Colace  []Tried Miralax    All questions and concerns addressed. Pt is doing well overall. Drinking 44oz fluid, only 1 protein shake because she is just over them. Discussed using unflavored in CL and/or trying protein perea.   Pt states she sleeps in a little and fluids could easily increase. Eating pureed food 2x/day. Pt states she has to use prilosec and gas-X daily, doesn't feel like it is acid reflux but reports a balloon/bubble feeling in her chest.  Reviewed sipping guidelines, likely eating or drinking too fast or too much at a time. Pt states she has had some lightheadedness, has an appointment with PCP tomorrow. Pt also concerned with constipation, reviewed colace/miralax options. Pt was advised to focus on fluids, goal for 48oz daily plus 2 protein shake and/or use protein perea. Pt voiced understanding and felt confident in goals. Patient was asked to please fill out hospital survey if she receives one in the mail.

## 2022-06-01 ENCOUNTER — OFFICE VISIT (OUTPATIENT)
Dept: PRIMARY CARE CLINIC | Age: 27
End: 2022-06-01
Payer: COMMERCIAL

## 2022-06-01 VITALS
SYSTOLIC BLOOD PRESSURE: 109 MMHG | HEART RATE: 106 BPM | HEIGHT: 69 IN | BODY MASS INDEX: 40.88 KG/M2 | WEIGHT: 276 LBS | TEMPERATURE: 97.3 F | DIASTOLIC BLOOD PRESSURE: 76 MMHG

## 2022-06-01 DIAGNOSIS — Z09 HOSPITAL DISCHARGE FOLLOW-UP: Primary | ICD-10-CM

## 2022-06-01 PROCEDURE — 99214 OFFICE O/P EST MOD 30 MIN: CPT | Performed by: NURSE PRACTITIONER

## 2022-06-01 PROCEDURE — 1111F DSCHRG MED/CURRENT MED MERGE: CPT | Performed by: NURSE PRACTITIONER

## 2022-06-01 ASSESSMENT — ENCOUNTER SYMPTOMS
CHEST TIGHTNESS: 0
CONSTIPATION: 0
ABDOMINAL PAIN: 0
DIARRHEA: 0
SHORTNESS OF BREATH: 0

## 2022-06-01 ASSESSMENT — PATIENT HEALTH QUESTIONNAIRE - PHQ9
SUM OF ALL RESPONSES TO PHQ QUESTIONS 1-9: 0
SUM OF ALL RESPONSES TO PHQ QUESTIONS 1-9: 0
2. FEELING DOWN, DEPRESSED OR HOPELESS: 0
SUM OF ALL RESPONSES TO PHQ9 QUESTIONS 1 & 2: 0
SUM OF ALL RESPONSES TO PHQ QUESTIONS 1-9: 0
SUM OF ALL RESPONSES TO PHQ QUESTIONS 1-9: 0
1. LITTLE INTEREST OR PLEASURE IN DOING THINGS: 0

## 2022-06-01 NOTE — PROGRESS NOTES
Post-Discharge Transitional Care Follow Up      Cathy Tipton   YOB: 1995    Date of Office Visit:  6/1/2022  Date of Hospital Admission: 5/23/22  Date of Hospital Discharge: 5/24/22  Readmission Risk Score (high >=14%. Medium >=10%):Readmission Risk Score: 5.8 ( )      Care management risk score Rising risk (score 2-5) and Complex Care (Scores >=6): 2     Non face to face  following discharge, date last encounter closed (first attempt may have been earlier): 5/26/2022 11:31 AM     Call initiated 2 business days of discharge: Yes     Hospital discharge follow-up  -     [place] DISCHARGE MEDS RECONCILED W/ CURRENT OUTPATIENT MED LIST      Medical Decision Making: moderate complexity  Return in 3 months (on 9/1/2022). On this date 6/1/2022 I have spent 15 minutes reviewing previous notes, test results and face to face with the patient discussing the diagnosis and importance of compliance with the treatment plan as well as documenting on the day of the visit. Subjective:   HPI  S/p gastric weight loss surgery 5/23/2022, had Dietitian follow up yesterday, has upcoming appointment with Surgeon on 6/7. Has abdominal binder, wears when outside of home, not wearing, states she was hot. Had BM on Wednesday and Thursday, advised to take Colace. Urinating twice daily, advised to increase water intake. 48-68 oz water and 2 protein shakes. Denies nausea and vomiting. Did not take off, working from home. Denies heavy lifting or strenuous activity. Inpatient course: Discharge summary reviewed- see chart.     Interval history/Current status: stable    Patient Active Problem List   Diagnosis    Asthma    Iron deficiency anemia    Lichen simplex    Menorrhagia with regular cycle    Generalized anxiety disorder    Seasonal allergic rhinitis due to pollen    Hidradenitis    Menorrhagia    Prediabetes    Obstructive sleep apnea    Postprandial abdominal bloating    Chronic GERD    Elevated blood pressure reading    PVC (premature ventricular contraction)    Morbid obesity with BMI of 40.0-44.9, adult ([facility] [address]    S/P laparoscopic sleeve gastrectomy       Medications listed as ordered at the time of discharge from hospital     Medication List          Accurate as of June 1, 2022  4:47 PM. If you have any questions, ask your nurse or doctor. CONTINUE taking these medications    Benzoyl Peroxide 10 % external wash  Commonly known as: BENZAC AC  Apply topically 2 times daily.      cetirizine 10 MG tablet  Commonly known as: ZYRTEC  TAKE 1 TABLET BY MOUTH EVERY DAY     ferrous sulfate 325 (65 Fe) MG tablet  Commonly known as: IRON 325  Take 1 tablet by mouth 2 times daily (with meals)     fluticasone 50 MCG/ACT nasal spray  Commonly known as: FLONASE  SHAKE LIQUID AND USE 1 SPRAY IN EACH NOSTRIL DAILY     montelukast 10 MG tablet  Commonly known as: SINGULAIR  TAKE 1 TABLET BY MOUTH EVERY DAY AT NIGHT     omeprazole 20 MG delayed release capsule  Commonly known as: PRILOSEC  Take 1 capsule by mouth every morning (before breakfast)     ProAir  (90 Base) MCG/ACT inhaler  Generic drug: albuterol sulfate HFA  TAKE 2 PUFFS BY MOUTH EVERY 6 HOURS AS NEEDED FOR WHEEZE     venlafaxine 37.5 MG extended release capsule  Commonly known as: EFFEXOR XR  TAKE 1 CAPSULE BY MOUTH EVERY DAY             Medications marked \"taking\" at this time  Outpatient Medications Marked as Taking for the 6/1/22 encounter (Office Visit) with CASPER Costello CNP   Medication Sig Dispense Refill    PROAIR  (90 Base) MCG/ACT inhaler TAKE 2 PUFFS BY MOUTH EVERY 6 HOURS AS NEEDED FOR WHEEZE 8.5 each 3    cetirizine (ZYRTEC) 10 MG tablet TAKE 1 TABLET BY MOUTH EVERY DAY 30 tablet 4    omeprazole (PRILOSEC) 20 MG delayed release capsule Take 1 capsule by mouth every morning (before breakfast) 90 capsule 1    venlafaxine (EFFEXOR XR) 37.5 MG extended release capsule TAKE 1 CAPSULE BY MOUTH EVERY DAY 60 capsule 3  ferrous sulfate (IRON 325) 325 (65 Fe) MG tablet Take 1 tablet by mouth 2 times daily (with meals) 60 tablet 3    Benzoyl Peroxide (BENZAC AC) 10 % external wash Apply topically 2 times daily. 187 g 3    montelukast (SINGULAIR) 10 MG tablet TAKE 1 TABLET BY MOUTH EVERY DAY AT NIGHT 90 tablet 1    fluticasone (FLONASE) 50 MCG/ACT nasal spray SHAKE LIQUID AND USE 1 SPRAY IN EACH NOSTRIL DAILY 1 Bottle 2        Medications patient taking as of now reconciled against medications ordered at time of hospital discharge: Yes    Review of Systems   Constitutional: Negative for activity change and fever. HENT: Negative for congestion. Eyes: Negative for visual disturbance. Respiratory: Negative for chest tightness and shortness of breath. Cardiovascular: Negative for chest pain, palpitations and leg swelling. Gastrointestinal: Negative for abdominal pain, constipation and diarrhea. S/p gastric weight surgery 5/2022   Endocrine: Negative for polyuria. Genitourinary: Negative for dysuria. Musculoskeletal: Negative for arthralgias and myalgias. Skin: Negative for rash. Neurological: Negative for dizziness, light-headedness and headaches. Psychiatric/Behavioral: Negative for agitation, decreased concentration and sleep disturbance. The patient is not nervous/anxious. Objective:    /76 (Site: Right Upper Arm, Position: Sitting)   Pulse (!) 106   Temp 97.3 °F (36.3 °C)   Ht 5' 9\" (1.753 m)   Wt 276 lb (125.2 kg)   BMI 40.76 kg/m²        Physical Exam  Vitals reviewed. Constitutional:       Appearance: She is well-developed. HENT:      Head: Normocephalic. Right Ear: Hearing and external ear normal.      Left Ear: Hearing and external ear normal.      Nose: Nose normal.   Eyes:      General: Lids are normal.   Cardiovascular:      Rate and Rhythm: Normal rate and regular rhythm.       Heart sounds: Normal heart sounds, S1 normal and S2 normal.   Pulmonary:      Effort: Pulmonary effort is normal.      Breath sounds: Normal breath sounds. Abdominal:      Comments: 5 well healing abdominal incision. Dry and intact, no bleeding or drainage   Musculoskeletal:         General: Normal range of motion. Skin:     General: Skin is warm and dry. Findings: No rash. Neurological:      Mental Status: She is alert and oriented to person, place, and time. GCS: GCS eye subscore is 4. GCS verbal subscore is 5. GCS motor subscore is 6. Psychiatric:         Speech: Speech normal.         Behavior: Behavior normal. Behavior is cooperative. An electronic signature was used to authenticate this note.   --Sheri Farris, CASPER - CNP

## 2022-06-02 ENCOUNTER — PATIENT MESSAGE (OUTPATIENT)
Dept: BARIATRICS/WEIGHT MGMT | Age: 27
End: 2022-06-02

## 2022-06-03 NOTE — TELEPHONE ENCOUNTER
From: Pilar Gomez  To: Gonzalo Buckley  Sent: 6/2/2022 10:02 PM EDT  Subject: Stool Question    Hi Kranthi Pizarro, I hope all is well! Just a quick question. Is it normal to see a small amount of bright red blood in stool. This is my fourth bowel movement since the surgery. I will say it was rather difficult to pass and I strained a little bit. I only saw a small bright red streak and a little when I wiped. This hasnt happened with any of the other movements this was more solid as the rest were liquids. Could this be due to straining while trying to pass the movement or is this a concern ?

## 2022-06-07 ENCOUNTER — OFFICE VISIT (OUTPATIENT)
Dept: BARIATRICS/WEIGHT MGMT | Age: 27
End: 2022-06-07

## 2022-06-07 VITALS
BODY MASS INDEX: 41.37 KG/M2 | WEIGHT: 273 LBS | HEIGHT: 68 IN | OXYGEN SATURATION: 98 % | SYSTOLIC BLOOD PRESSURE: 123 MMHG | RESPIRATION RATE: 18 BRPM | DIASTOLIC BLOOD PRESSURE: 63 MMHG | HEART RATE: 81 BPM

## 2022-06-07 DIAGNOSIS — Z98.84 S/P LAPAROSCOPIC SLEEVE GASTRECTOMY: Primary | ICD-10-CM

## 2022-06-07 PROCEDURE — 99024 POSTOP FOLLOW-UP VISIT: CPT | Performed by: SURGERY

## 2022-06-07 NOTE — PROGRESS NOTES
Dietary Assessment Note      Vitals:   Vitals:    22 1022   Resp: 18   Height: 5' 8\" (1.727 m)    Patient lost 21 lbs over past month. Total Weight Loss: 24 lbs    Labs reviewed:     Results for Hermilo Chan (MRN [id number]) as of 2022 10:29   Ref. Range 2022 06:08   Sodium Latest Ref Range: 136 - 145 mmol/L 136   Potassium Latest Ref Range: 3.5 - 5.1 mmol/L 4.1   Chloride Latest Ref Range: 99 - 110 mmol/L 101   CO2 Latest Ref Range: 21 - 32 mmol/L 18 (L)   BUN,BUNPL Latest Ref Range: 7 - 20 mg/dL 4 (L)   Creatinine Latest Ref Range: 0.6 - 1.1 mg/dL 0.5 (L)   Anion Gap Latest Ref Range: 3 - 16  17 (H)   GFR Non- Latest Ref Range: >60  >60   GFR  Latest Ref Range: >60  >60   GLUCOSE, FASTING,GF Latest Ref Range: 70 - 99 mg/dL 122 (H)   CALCIUM, SERUM, 420364 Latest Ref Range: 8.3 - 10.6 mg/dL 10.0       Protein intake: <60 grams/day     Fluid intake: 50 oz     Multivitamin/mineral intake: 4 Fusion     Calcium intake: none     Other: taking colace for constipation     Exercise: walking every other day     Nutrition Assessment: 2 week s/p sleeve post-op visit. Drinking 1 shake per day and eating pureed foods 1-2 x day. Breakfast: drinking fluids     Snack: drinking fluids     Lunch: Premier protein RTD / [place] [employer] protein shake made with lactose free milk (8 oz)     Snack: eggs with cheese and nutritional yeast    Dinner: sometimes - egg salad OR ricotta bake OR refried beans     Snack: drinking fluids     Fluids:  Drinking water and crystal light     Consuming only full liquid/pureed foods?   Yes     Amount able to eat per sittin oz per sitting     Following  rule: Yes    Food Intolerances/issues: protein perea - did not like residue it left on tongue     Client Concerns: having heartburn and sometimes takes omeprazole     Goals:   Start Phase 3 at 3 weeks post op - handout provided and reviewed  Use scoop of protein powder per day   Eat pureed food at least 2 x day - goal is 60 grams of protein per day       Plan: F/U per Provider     Maxi Rocha RD, LD

## 2022-06-14 ENCOUNTER — TELEPHONE (OUTPATIENT)
Dept: BARIATRICS/WEIGHT MGMT | Age: 27
End: 2022-06-14

## 2022-06-14 NOTE — TELEPHONE ENCOUNTER
RD called and spoke with pt. States she was doing fine at 2 weeks post op but no longer tolerating and it makes her want to vomit. Discussed alternatives available and regimen to follow. Discussed Calcium chews at 6 weeks but Citracal max plus starting immediately and taking separately from MVI and each other. Email verified and handout sent to pt. Pt also stating she wants to use miralax as she is having a lot of gas. Reports finally reaching 48 oz per day. She is currently using Colace. Discussed continuing with colace up to 2 x day and adding in miralax, and then focusing on increasing fluids to see if it helps. Pt verbalized understanding.

## 2022-06-14 NOTE — TELEPHONE ENCOUNTER
Pt is s/p sleeve 05/23/2022. Pt came in office today stating she is having issues with the multivitamin that pts have to take. Pt wanted to know if there were any alternatives. Informed pt that alternatives for multivitamins can't start until after 6 wk post op. Pt wanted more information on alternatives. Pt call back number is [phone].

## 2022-06-26 DIAGNOSIS — F41.1 GENERALIZED ANXIETY DISORDER: ICD-10-CM

## 2022-06-27 RX ORDER — VENLAFAXINE HYDROCHLORIDE 37.5 MG/1
CAPSULE, EXTENDED RELEASE ORAL
Qty: 60 CAPSULE | Refills: 3 | Status: SHIPPED | OUTPATIENT
Start: 2022-06-27

## 2022-06-27 NOTE — TELEPHONE ENCOUNTER
Medication:   Requested Prescriptions     Pending Prescriptions Disp Refills    venlafaxine (EFFEXOR XR) 37.5 MG extended release capsule [Pharmacy Med Name: VENLAFAXINE HCL ER 37.5 MG CAP] 60 capsule 3     Sig: TAKE 1 CAPSULE BY MOUTH EVERY DAY        Last Filled:      Patient Phone Number: [phone] (home)     Last appt: 6/1/2022   Next appt: Visit date not found    Last OARRS: No flowsheet data found.

## 2022-07-07 ENCOUNTER — OFFICE VISIT (OUTPATIENT)
Dept: BARIATRICS/WEIGHT MGMT | Age: 27
End: 2022-07-07

## 2022-07-07 VITALS
BODY MASS INDEX: 40.01 KG/M2 | RESPIRATION RATE: 18 BRPM | HEIGHT: 68 IN | HEART RATE: 93 BPM | DIASTOLIC BLOOD PRESSURE: 77 MMHG | SYSTOLIC BLOOD PRESSURE: 123 MMHG | WEIGHT: 264 LBS | OXYGEN SATURATION: 98 %

## 2022-07-07 DIAGNOSIS — Z98.84 S/P LAPAROSCOPIC SLEEVE GASTRECTOMY: Primary | ICD-10-CM

## 2022-07-07 PROCEDURE — 99024 POSTOP FOLLOW-UP VISIT: CPT | Performed by: SURGERY

## 2022-07-07 NOTE — PROGRESS NOTES
Dietary Assessment Note      Vitals:   Vitals:    07/07/22 1327   BP: 123/77   Pulse: 93   Resp: 18   SpO2: 98%   Weight: 264 lb (119.7 kg)   Height: 5' 8\" (1.727 m)    Patient lost 9 lbs over past month. Total Weight Loss: 33 lbs    Labs reviewed: No new nutrition related labs     Protein intake: 60-80 grams/day     Fluid intake: 48-64 oz/day    Multivitamin/mineral intake: 2 Fusion - throws up if taking all 4 vitamins, takes with food on stomach    Calcium intake: none     Other: none     Exercise: Walking     Nutrition Assessment: 6 week s/p sleeve post-op visit. Tracking her intakes and gets between 800-1200 calories/day. Breakfast: [employer] core power protein shake (42 g/pro)     Snack: nothing     Lunch: eggs/cheese/chicken/ tuna with green beans/brussels sprouts/zucchini squash    Snack: eggs/cheese/chicken/ tuna with green beans/brussels sprouts/zucchini squash    Dinner: eggs/cheese/chicken/ tuna with green beans/brussels sprouts/zucchini squash    Snack: nothing     Fluids: water, crystal light     Consuming only soft/mushy foods?  Overall     Amount able to eat per sitting: up to 6 oz     Following 30/30/30 rule: Yes     Food Intolerances/issues: protein perea - did not like residue it left on tongue    Client Concerns: Constipation improved, heartburn improved, muscle spasms improved    Goals:   Start Phase 4 at 6 weeks post op - handout provided and reviewed  Keep tracking intakes and shoot for 800-1000 calories and 60 g/pro/day    Stay at 4-6 oz per sitting   Start MVI alternative - handout provided    Plan: F/U at 4 months     Alicia Smyth RD, LD

## 2022-07-07 NOTE — PROGRESS NOTES
The patient is a 31 y.o. female who returns today for follow up.    Andrew Perez is s/p     Laparoscopic sleeve gastrectomy    We discussed how her weight affects her overall health including:  Patient Active Problem List   Diagnosis    Asthma    Iron deficiency anemia    Lichen simplex    Menorrhagia with regular cycle    Generalized anxiety disorder    Seasonal allergic rhinitis due to pollen    Hidradenitis    Menorrhagia    Prediabetes    Obstructive sleep apnea    Postprandial abdominal bloating    Chronic GERD    Elevated blood pressure reading    PVC (premature ventricular contraction)    Morbid obesity with BMI of 40.0-44.9, adult ([facility]    S/P laparoscopic sleeve gastrectomy        Vitals:    07/07/22 1327   BP: 123/77   Pulse: 93   Resp: 18   SpO2: 98%   Weight: 264 lb (119.7 kg)   Height: 5' 8\" (1.727 m)       Lab Results   Component Value Date/Time    WBC 13.9 05/24/2022 06:08 AM    RBC 4.91 05/24/2022 06:08 AM    RBC 4.87 02/14/2020 01:34 PM    HGB 12.8 05/24/2022 06:08 AM    HCT 39.6 05/24/2022 06:08 AM    MCV 80.6 05/24/2022 06:08 AM    MCH 26.1 05/24/2022 06:08 AM    MCHC 32.4 05/24/2022 06:08 AM    MPV 8.2 05/24/2022 06:08 AM    NEUTOPHILPCT 51.7 12/15/2021 01:29 PM    LYMPHOPCT 39.7 12/15/2021 01:29 PM    LYMPHOPCT 31.9 02/14/2020 01:34 PM    MONOPCT 6.7 12/15/2021 01:29 PM    EOSRELPCT 1.7 12/15/2021 01:29 PM    BASOPCT 0.2 12/15/2021 01:29 PM    NEUTROABS 2.8 12/15/2021 01:29 PM    LYMPHSABS 2.2 12/15/2021 01:29 PM    MONOSABS 0.4 12/15/2021 01:29 PM    EOSABS 0.1 12/15/2021 01:29 PM     Lab Results   Component Value Date/Time     05/24/2022 06:08 AM    K 4.1 05/24/2022 06:08 AM    K 3.8 11/13/2020 08:27 AM     05/24/2022 06:08 AM    CO2 18 05/24/2022 06:08 AM    ANIONGAP 17 05/24/2022 06:08 AM    GLUCOSE 122 05/24/2022 06:08 AM    BUN 4 05/24/2022 06:08 AM    CREATININE 0.5 05/24/2022 06:08 AM    LABGLOM >60 05/24/2022 06:08 AM    GFRAA >60 05/24/2022 06:08 AM CALCIUM 10.0 05/24/2022 06:08 AM    PROT 7.5 05/03/2022 03:45 PM    LABALBU 4.3 05/03/2022 03:45 PM    AGRATIO 1.3 05/03/2022 03:45 PM    BILITOT 0.9 05/03/2022 03:45 PM    ALKPHOS 65 05/03/2022 03:45 PM    ALT 18 05/03/2022 03:45 PM    AST 15 05/03/2022 03:45 PM    GLOB 3.4 07/29/2021 11:32 AM     Lab Results   Component Value Date/Time    CHOL 162 12/15/2021 01:29 PM    TRIG 92 12/15/2021 01:29 PM    HDL 45 12/15/2021 01:29 PM    LDLCALC 99 12/15/2021 01:29 PM    LABVLDL 18 12/15/2021 01:29 PM     Lab Results   Component Value Date/Time    TSHREFLEX 1.06 12/15/2021 01:29 PM     Lab Results   Component Value Date/Time    IRON 25 12/15/2021 01:29 PM    TIBC 382 12/15/2021 01:29 PM    LABIRON 7 12/15/2021 01:29 PM     Lab Results   Component Value Date/Time    PWOCTDBT55 732 12/15/2021 01:29 PM    FOLATE 15.09 12/15/2021 01:29 PM     Lab Results   Component Value Date/Time    VITD25 6.4 12/15/2021 01:29 PM     Lab Results   Component Value Date/Time    LABA1C 6.3 12/15/2021 01:29 PM    .1 12/15/2021 01:29 PM        The patient's current Body mass index is 40.14 kg/m². (7/7/22). Since her last visit she has lost 9 lbs since last visit and total of 33 lbs. Marbella Treviño underwent dietary counseling, and I have reviewed and agree with the dietary counseling, and I have reviewed and agree with the diet plan. There are no changes in the patients medical history or physical exam.     Denies nausea, vomiting, fevers, chills, hiccups, shoulder pain, heartburn, dysphagia wound drainage/bulge nor change in color around incision. Bowels working ok and making urine. The incisions healing well. Overall I'm really pleased with Marbella Treviño recovery. Pathology results were discussed with the patient. Marbella Treviño advised to sign  release form for utilizing the 3 months complimentary membership in the [address] [place] [address] starting after 6 weeks post op.     I did explain thoroughly to the patient that compliance with  post op diet and other recommendations are integral part to improve the chances of successful weight loss and also not following it could end with serious health complications. I advised Natasha Rae to gradually advance activity and  to call if there are any questions or concerns. Natasha Rae will follow up in 8 weeks. Please note that some or all of this report was generated using voice recognition software. Please notify me in case of any questions about the content of this document, as some errors in transcription may have occurred .

## 2022-07-19 ENCOUNTER — OFFICE VISIT (OUTPATIENT)
Dept: PRIMARY CARE CLINIC | Age: 27
End: 2022-07-19
Payer: COMMERCIAL

## 2022-07-19 VITALS
BODY MASS INDEX: 39.34 KG/M2 | SYSTOLIC BLOOD PRESSURE: 110 MMHG | WEIGHT: 259.6 LBS | HEIGHT: 68 IN | TEMPERATURE: 98 F | DIASTOLIC BLOOD PRESSURE: 78 MMHG | HEART RATE: 86 BPM | OXYGEN SATURATION: 99 %

## 2022-07-19 DIAGNOSIS — E55.9 VITAMIN D DEFICIENCY: ICD-10-CM

## 2022-07-19 DIAGNOSIS — R73.03 PREDIABETES: ICD-10-CM

## 2022-07-19 DIAGNOSIS — R42 DIZZINESS: Primary | ICD-10-CM

## 2022-07-19 DIAGNOSIS — D50.9 IRON DEFICIENCY ANEMIA, UNSPECIFIED IRON DEFICIENCY ANEMIA TYPE: ICD-10-CM

## 2022-07-19 PROCEDURE — G8427 DOCREV CUR MEDS BY ELIG CLIN: HCPCS | Performed by: NURSE PRACTITIONER

## 2022-07-19 PROCEDURE — 1036F TOBACCO NON-USER: CPT | Performed by: NURSE PRACTITIONER

## 2022-07-19 PROCEDURE — G8417 CALC BMI ABV UP PARAM F/U: HCPCS | Performed by: NURSE PRACTITIONER

## 2022-07-19 PROCEDURE — 99214 OFFICE O/P EST MOD 30 MIN: CPT | Performed by: NURSE PRACTITIONER

## 2022-07-19 ASSESSMENT — ENCOUNTER SYMPTOMS
ABDOMINAL PAIN: 0
DIARRHEA: 0
CHEST TIGHTNESS: 0
CONSTIPATION: 0
SHORTNESS OF BREATH: 0

## 2022-07-19 ASSESSMENT — PATIENT HEALTH QUESTIONNAIRE - PHQ9
SUM OF ALL RESPONSES TO PHQ QUESTIONS 1-9: 0
SUM OF ALL RESPONSES TO PHQ9 QUESTIONS 1 & 2: 0
SUM OF ALL RESPONSES TO PHQ QUESTIONS 1-9: 0
1. LITTLE INTEREST OR PLEASURE IN DOING THINGS: 0
2. FEELING DOWN, DEPRESSED OR HOPELESS: 0

## 2022-07-19 NOTE — PROGRESS NOTES
Jami Wiley (:  1995) is a 31 y.o. female,Established patient, here for evaluation of the following chief complaint(s):  Dizziness (After weight loss surgery )         ASSESSMENT/PLAN:  1. Dizziness- dehydration vs vitamin deficiency; s/p 48 holter monitor without significant abnormalities  -     CBC with Auto Differential; Future  -     TSH with Reflex; Future  -     Hemoglobin A1C; Future  -     Vitamin D 25 Hydroxy; Future  -     Iron and TIBC; Future  -     Ferritin; Future  -     Vitamin B12 & Folate; Future  -     VITAMIN B1, WHOLE BLOOD; Future  -     Comprehensive Metabolic Panel; Future  2. Vitamin D deficiency  -     Vitamin D 25 Hydroxy; Future  3. Iron deficiency anemia, unspecified iron deficiency anemia type  -     CBC with Auto Differential; Future  -     Iron and TIBC; Future  -     Ferritin; Future  4. Prediabetes  -     Hemoglobin A1C; Future    No follow-ups on file. Subjective   SUBJECTIVE/OBJECTIVE:  HPI  S/p gastric weight loss surgery 2022, has abdominal binder, wears when outside of home, not wearing, states she was hot. Regular BM, advised to take Colace. Urinating twice daily, advised to increase water intake. 48-68 oz water and 2 protein shakes. Denies nausea and vomiting. Did not take off, working from home. Denies heavy lifting or strenuous activity. Intermittent heart fluttering throughout the day. Denies chest pain, shortness of breath. Event monitor for 3 weeks did not reveal palpitations. States it occurs while eating. Exercising regularly. Struggling to get daily protein, drinking at least 48 ounces of water daily     Review of Systems   Constitutional:  Negative for activity change and fever. HENT:  Negative for congestion. Eyes:  Negative for visual disturbance. Respiratory:  Negative for chest tightness and shortness of breath. Cardiovascular:  Negative for chest pain, palpitations and leg swelling.    Gastrointestinal:  Negative for abdominal pain, constipation and diarrhea. S/p gastric weight surgery 5/2022   Endocrine: Negative for polyuria. Genitourinary:  Negative for dysuria. Musculoskeletal:  Negative for arthralgias and myalgias. Skin:  Negative for rash. Neurological:  Negative for dizziness, light-headedness and headaches. Psychiatric/Behavioral:  Negative for agitation, decreased concentration and sleep disturbance. The patient is not nervous/anxious. Objective    height is 5' 8\" (1.727 m) and weight is 259 lb 9.6 oz (117.8 kg). Her temperature is 98 °F (36.7 °C). Her blood pressure is 110/78 and her pulse is 86. Her oxygen saturation is 99%.     BP Readings from Last 3 Encounters:   07/19/22 110/78   07/07/22 123/77   06/07/22 123/63      Wt Readings from Last 3 Encounters:   07/19/22 259 lb 9.6 oz (117.8 kg)   07/07/22 264 lb (119.7 kg)   06/07/22 273 lb (123.8 kg)      Patient Active Problem List   Diagnosis    Asthma    Iron deficiency anemia    Lichen simplex    Menorrhagia with regular cycle    Generalized anxiety disorder    Seasonal allergic rhinitis due to pollen    Hidradenitis    Menorrhagia    Prediabetes    Obstructive sleep apnea    Postprandial abdominal bloating    Chronic GERD    Elevated blood pressure reading    PVC (premature ventricular contraction)    Morbid obesity with BMI of 40.0-44.9, adult (HCC)    S/P laparoscopic sleeve gastrectomy      Lab Results   Component Value Date    WBC 13.9 (H) 05/24/2022    HGB 12.8 05/24/2022    HCT 39.6 05/24/2022    MCV 80.6 05/24/2022     05/24/2022     Lab Results   Component Value Date     05/24/2022    K 4.1 05/24/2022     05/24/2022    CO2 18 (L) 05/24/2022    BUN 4 (L) 05/24/2022    CREATININE 0.5 (L) 05/24/2022    GLUCOSE 122 (H) 05/24/2022    CALCIUM 10.0 05/24/2022    PROT 7.5 05/03/2022    LABALBU 4.3 05/03/2022    BILITOT 0.9 05/03/2022    ALKPHOS 65 05/03/2022    AST 15 05/03/2022    ALT 18 05/03/2022    LABGLOM >60 05/24/2022    GFRAA >60 05/24/2022    AGRATIO 1.3 05/03/2022    GLOB 3.4 07/29/2021        Physical Exam  Vitals reviewed. Constitutional:       Appearance: She is well-developed. HENT:      Head: Normocephalic. Right Ear: Hearing and external ear normal.      Left Ear: Hearing and external ear normal.      Nose: Nose normal.   Eyes:      General: Lids are normal.   Cardiovascular:      Rate and Rhythm: Normal rate and regular rhythm. Heart sounds: Normal heart sounds, S1 normal and S2 normal.   Pulmonary:      Effort: Pulmonary effort is normal.      Breath sounds: Normal breath sounds. Musculoskeletal:         General: Normal range of motion. Skin:     General: Skin is warm and dry. Findings: No rash. Neurological:      Mental Status: She is alert and oriented to person, place, and time. GCS: GCS eye subscore is 4. GCS verbal subscore is 5. GCS motor subscore is 6. Psychiatric:         Speech: Speech normal.         Behavior: Behavior normal. Behavior is cooperative. On this date 7/19/2022 I have spent 15 minutes reviewing previous notes, test results and face to face with the patient discussing the diagnosis and importance of compliance with the treatment plan as well as documenting on the day of the visit. An electronic signature was used to authenticate this note. --CASPER Esqueda CNP PHQ-9 score today: (PHQ-9 Total Score: 0), additional evaluation and assessment performed, follow-up plan includes but not limited to: Medication management and Referral to /Specialist  for evaluation and management.

## 2022-08-17 ENCOUNTER — OFFICE VISIT (OUTPATIENT)
Dept: PRIMARY CARE CLINIC | Age: 27
End: 2022-08-17
Payer: COMMERCIAL

## 2022-08-17 VITALS
OXYGEN SATURATION: 96 % | WEIGHT: 256 LBS | DIASTOLIC BLOOD PRESSURE: 73 MMHG | HEIGHT: 68 IN | TEMPERATURE: 97.2 F | HEART RATE: 77 BPM | SYSTOLIC BLOOD PRESSURE: 108 MMHG | BODY MASS INDEX: 38.8 KG/M2

## 2022-08-17 DIAGNOSIS — L73.2 HIDRADENITIS: ICD-10-CM

## 2022-08-17 DIAGNOSIS — R73.03 PREDIABETES: Primary | ICD-10-CM

## 2022-08-17 PROBLEM — J30.1 SEASONAL ALLERGIC RHINITIS DUE TO POLLEN: Status: RESOLVED | Noted: 2017-04-17 | Resolved: 2022-08-17

## 2022-08-17 LAB — HBA1C MFR BLD: 5.4 %

## 2022-08-17 PROCEDURE — G8417 CALC BMI ABV UP PARAM F/U: HCPCS | Performed by: NURSE PRACTITIONER

## 2022-08-17 PROCEDURE — 83036 HEMOGLOBIN GLYCOSYLATED A1C: CPT | Performed by: NURSE PRACTITIONER

## 2022-08-17 PROCEDURE — 1036F TOBACCO NON-USER: CPT | Performed by: NURSE PRACTITIONER

## 2022-08-17 PROCEDURE — 99212 OFFICE O/P EST SF 10 MIN: CPT | Performed by: NURSE PRACTITIONER

## 2022-08-17 PROCEDURE — G8427 DOCREV CUR MEDS BY ELIG CLIN: HCPCS | Performed by: NURSE PRACTITIONER

## 2022-08-17 ASSESSMENT — ENCOUNTER SYMPTOMS
CONSTIPATION: 0
ABDOMINAL PAIN: 0
CHEST TIGHTNESS: 0
SHORTNESS OF BREATH: 0
DIARRHEA: 0

## 2022-08-17 NOTE — PROGRESS NOTES
abnormalities. She does not believe palpitations are related to anxiety   Holter Monitor results: 1/30/2022  Avg. Heart Rate:  94  BPM   Max. Heart Rate:  144  BPM   Min. Heart Rate:  76  BPM   LONGEST RR:  1.148  S   :  Normal holter ^ min HR is 76 ^ max HR is 144 ^ Normal sinus rhythm ^ PVCs 528 beats ^ no arrhythmias or heart blocks. Covid infection in January, 2022     Iron Deficiency  Referred to Hematology by Weight management for low H/H. Hematology prescribed iron supplement. H/H improved: 9.6/30.5-->12.0/36.8     Prediabetes  A1C 6.3-->5.4%, Metformin, tolerates well. Has heavy menses, gynecology reports it is related to weight. tried BCP which caused frequent menses therefore she discontinued. Has ALPHONSO, uses sleep device. Has elevated blood pressure readings in the emergency department and other office visits        Review of Systems   Constitutional:  Negative for activity change and fever. HENT:  Negative for congestion. Eyes:  Negative for visual disturbance. Respiratory:  Negative for chest tightness and shortness of breath. ALPHONSO   Cardiovascular:  Negative for chest pain, palpitations and leg swelling. Gastrointestinal:  Negative for abdominal pain, constipation and diarrhea. S/p gastric weight surgery 5/2022   Endocrine: Negative for polyuria. Genitourinary:  Negative for dysuria. Musculoskeletal:  Negative for arthralgias and myalgias. Skin:  Negative for rash. Hidradenitis- axilla and groin    Neurological:  Negative for dizziness, light-headedness and headaches. Psychiatric/Behavioral:  Negative for agitation, decreased concentration and sleep disturbance. The patient is not nervous/anxious. Objective    height is 5' 8\" (1.727 m) and weight is 256 lb (116.1 kg). Her temperature is 97.2 °F (36.2 °C). Her blood pressure is 108/73 and her pulse is 77. Her oxygen saturation is 96%.     BP Readings from Last 3 Encounters:   08/17/22 108/73 07/19/22 110/78   07/07/22 123/77      Wt Readings from Last 3 Encounters:   08/17/22 256 lb (116.1 kg)   07/19/22 259 lb 9.6 oz (117.8 kg)   07/07/22 264 lb (119.7 kg)      Past Medical History:   Diagnosis Date    Allergic rhinitis     Anxiety     Asthma     Chronic GERD 11/30/2021    Diabetes mellitus ([address]     Hidradenitis     History of prediabetes 2012    Iron deficiency anemia     Menorrhagia     Migraine     Obesity     Obstructive sleep apnea     cpap    Pre-operative clearance 10/27/2020    Prediabetes 2/17/2020      Past Surgical History:   Procedure Laterality Date    CHOLECYSTECTOMY      SLEEVE GASTRECTOMY N/A 5/23/2022    LAPAROSCOPIC SLEEVE GASTRECTOMY performed by Leandra Farfan MD at [place] [id number] N/A 5/6/2022    EGD BIOPSY performed by Leandra Farfan MD at [address] [place] [address]      Family History   Problem Relation Age of Onset    Other Mother         uterine fibroids    Diabetes Mother     Thyroid Disease Mother     Elevated Lipids Mother     Diabetes Father     Hypertension Father     Stroke Father 64    Sleep Apnea Father     Diabetes Other     Hypertension Other     Other Other         kidney disease (paternal uncle)    Other Maternal Grandmother         diverticulitis    Diabetes Maternal Grandmother     Other Paternal Grandmother         bone cancer     Heart Attack Paternal Grandmother     Colon Cancer Paternal Grandfather       Social History     Tobacco Use    Smoking status: Never    Smokeless tobacco: Never   Vaping Use    Vaping Use: Never used   Substance Use Topics    Alcohol use: Not Currently     Alcohol/week: 1.0 standard drink     Types: 1 Glasses of wine per week     Comment: belongs to a wine-club-  (once a month )     Drug use: No      Outpatient Encounter Medications as of 8/17/2022   Medication Sig Dispense Refill    venlafaxine (EFFEXOR XR) 37.5 MG extended release capsule TAKE 1 CAPSULE BY MOUTH EVERY DAY 60 capsule 3 PROAIR  (90 Base) MCG/ACT inhaler TAKE 2 PUFFS BY MOUTH EVERY 6 HOURS AS NEEDED FOR WHEEZE 8.5 each 3    cetirizine (ZYRTEC) 10 MG tablet TAKE 1 TABLET BY MOUTH EVERY DAY 30 tablet 4    omeprazole (PRILOSEC) 20 MG delayed release capsule Take 1 capsule by mouth every morning (before breakfast) 90 capsule 1    ferrous sulfate (IRON 325) 325 (65 Fe) MG tablet Take 1 tablet by mouth 2 times daily (with meals) 60 tablet 3    Benzoyl Peroxide (BENZAC AC) 10 % external wash Apply topically 2 times daily. 187 g 3    montelukast (SINGULAIR) 10 MG tablet TAKE 1 TABLET BY MOUTH EVERY DAY AT NIGHT 90 tablet 1    fluticasone (FLONASE) 50 MCG/ACT nasal spray SHAKE LIQUID AND USE 1 SPRAY IN EACH NOSTRIL DAILY 1 Bottle 2     No facility-administered encounter medications on file as of 8/17/2022. Physical Exam  Vitals reviewed. Constitutional:       Appearance: She is well-developed. HENT:      Head: Normocephalic. Right Ear: Hearing and external ear normal.      Left Ear: Hearing and external ear normal.      Nose: Nose normal.   Eyes:      General: Lids are normal.   Cardiovascular:      Rate and Rhythm: Normal rate and regular rhythm. Pulses:           Popliteal pulses are 2+ on the right side and 2+ on the left side. Dorsalis pedis pulses are 2+ on the right side and 2+ on the left side. Heart sounds: Normal heart sounds, S1 normal and S2 normal.   Pulmonary:      Effort: Pulmonary effort is normal.      Breath sounds: Normal breath sounds. Musculoskeletal:         General: Normal range of motion. Right lower leg: No edema. Left lower leg: No edema. Skin:     General: Skin is warm and dry. Findings: No rash. Comments: Multiple keloid scars bilateral groin area from I&D of abscess. No erythema, swelling, tenderness. Neurological:      Mental Status: She is alert and oriented to person, place, and time. GCS: GCS eye subscore is 4. GCS verbal subscore is 5. GCS motor subscore is 6. Psychiatric:         Speech: Speech normal.         Behavior: Behavior normal. Behavior is cooperative. An electronic signature was used to authenticate this note.     --CASPER Peters - CNP

## 2022-09-20 ENCOUNTER — OFFICE VISIT (OUTPATIENT)
Dept: BARIATRICS/WEIGHT MGMT | Age: 27
End: 2022-09-20
Payer: COMMERCIAL

## 2022-09-20 VITALS
HEART RATE: 78 BPM | WEIGHT: 249 LBS | HEIGHT: 68 IN | RESPIRATION RATE: 18 BRPM | DIASTOLIC BLOOD PRESSURE: 64 MMHG | OXYGEN SATURATION: 98 % | SYSTOLIC BLOOD PRESSURE: 114 MMHG | BODY MASS INDEX: 37.74 KG/M2

## 2022-09-20 DIAGNOSIS — R73.03 PREDIABETES: ICD-10-CM

## 2022-09-20 DIAGNOSIS — G47.33 OBSTRUCTIVE SLEEP APNEA: ICD-10-CM

## 2022-09-20 DIAGNOSIS — K21.9 CHRONIC GERD: ICD-10-CM

## 2022-09-20 DIAGNOSIS — E66.01 MORBID OBESITY WITH BMI OF 40.0-44.9, ADULT (HCC): Primary | ICD-10-CM

## 2022-09-20 DIAGNOSIS — Z98.84 S/P LAPAROSCOPIC SLEEVE GASTRECTOMY: ICD-10-CM

## 2022-09-20 PROCEDURE — G8427 DOCREV CUR MEDS BY ELIG CLIN: HCPCS | Performed by: SURGERY

## 2022-09-20 PROCEDURE — 99214 OFFICE O/P EST MOD 30 MIN: CPT | Performed by: SURGERY

## 2022-09-20 PROCEDURE — G8417 CALC BMI ABV UP PARAM F/U: HCPCS | Performed by: SURGERY

## 2022-09-20 PROCEDURE — 1036F TOBACCO NON-USER: CPT | Performed by: SURGERY

## 2022-09-20 NOTE — PROGRESS NOTES
Dietary Assessment Note      Vitals:   Vitals:    09/20/22 1321   Resp: 18   Height: 5' 8\" (1.727 m)    Patient lost 15 lbs over 2.5 months. Total Weight Loss: 48 lbs    Labs reviewed: labs are reviewed, up to date and normal, no lab studies available for review at time of visit    Protein intake: >80 grams/day to 100g per day    Fluid intake: >64 oz/day     Multivitamin/mineral intake: yes fusion capsule w fe    Calcium intake: 2 fusion calcium chews    Other: no - hasn't had iron check from hematology recently, has one coming up. Exercise: yes - doing free group workouts and also working out  with spouse. Nutrition Assessment:      14 week post-op visit. Pt feels like her weight has plateau. She feels pressure from mom who she went out of town. Amount able to eat per sitting: she is eating 4 oz in a sitting, then still hungry and eats 6-8oz. After dinner she starts bingeing, waits 20 min then doesn't feel satisfied, then eats nuts, then \"I punish myself\" by eating more. Then she punishes self by skipping breakfast and restriction. She has not eaten anything yet today. Following 30/30/30 rule: yes she is, eats a meal in 35-40 minutes      Client Concerns: see above    Goals/Plan:  referral to behaviorist for self punitive behaviors, bingeing and restriction    Plan: eat protein rich breakfast, coping with cravings handout, keep a journal to observe behavior patterns and increase self awareness.  limit meals to 30 minutes    Louis Haynes, MS, RD, LD

## 2022-09-20 NOTE — PROGRESS NOTES
[facility]) Physicians   [facility]  Elvia Maya MD, [address], [address] [facility] [address], [address] [facility]    [place]  [address] . Phone: [phone]  Fax: [phone]            Chief Complaint   Patient presents with    Bariatrics Post Op Follow Up     14wk s/p sleeve 5/23/22           HPI:    Tab Dillard is a very pleasant 28 y.o.  female , Body mass index is 37.86 kg/m². Vernonia Chime And multiple medical problems who is presenting for bariatric follow up care. Pooja Martell is s/p laparoscopic sleeve gastrectomy by me 5/2022. Initial Weight: 301 lbs, Weight Loss: 52 lbs. Comes today to the clinic without any complaints. Patient denies any nausea, vomiting, fevers, chills, shortness of breath, chest pain, constipation or urinary symptoms. Denies any heartburn nor dysphagia. Patient informed us today that they are taking the multivitamins as instructed. Patient denies any tingling, weakness,  numbness nor any neurological symptoms. Pooja Martell is feeling very well, and is very active. Patient is very pleased with the weight loss and resolution of co-morbid conditions.       Pain Assessment   Denies any abdominal pain     Past Medical History:   Diagnosis Date    Allergic rhinitis     Anxiety     Asthma     Chronic GERD 11/30/2021    Diabetes mellitus ([facility] [address]     Hidradenitis     History of prediabetes 2012    Iron deficiency anemia     Menorrhagia     Migraine     Obesity     Obstructive sleep apnea     cpap    Pre-operative clearance 10/27/2020    Prediabetes 2/17/2020     Past Surgical History:   Procedure Laterality Date    CHOLECYSTECTOMY      SLEEVE GASTRECTOMY N/A 5/23/2022    LAPAROSCOPIC SLEEVE GASTRECTOMY performed by Oliver Guzman MD at [address] N/A 5/6/2022    EGD BIOPSY performed by lOiver Guzman MD at [address]     Family History   Problem Relation Age of Onset    Other Mother         uterine fibroids    Diabetes Mother Thyroid Disease Mother     Elevated Lipids Mother     Diabetes Father     Hypertension Father     Stroke Father 64    Sleep Apnea Father     Diabetes Other     Hypertension Other     Other Other         kidney disease (paternal uncle)    Other Maternal Grandmother         diverticulitis    Diabetes Maternal Grandmother     Other Paternal Grandmother         bone cancer     Heart Attack Paternal Grandmother     Colon Cancer Paternal Grandfather      Social History     Tobacco Use    Smoking status: Never    Smokeless tobacco: Never   Substance Use Topics    Alcohol use: Not Currently     Alcohol/week: 1.0 standard drink     Types: 1 Glasses of wine per week     Comment: belongs to a wine-club-  (once a month )      I counseled the patient on the importance of not smoking and risks of ETOH. Allergies   Allergen Reactions    Seasonal      Vitals:    09/20/22 1321   BP: 114/64   Pulse: 78   Resp: 18   SpO2: 98%   Weight: 249 lb (112.9 kg)   Height: 5' 8\" (1.727 m)       Body mass index is 37.86 kg/m².       Lab Results   Component Value Date/Time    WBC 13.9 05/24/2022 06:08 AM    RBC 4.91 05/24/2022 06:08 AM    RBC 4.87 02/14/2020 01:34 PM    HGB 12.8 05/24/2022 06:08 AM    HCT 39.6 05/24/2022 06:08 AM    MCV 80.6 05/24/2022 06:08 AM    MCH 26.1 05/24/2022 06:08 AM    MCHC 32.4 05/24/2022 06:08 AM    MPV 8.2 05/24/2022 06:08 AM    NEUTOPHILPCT 51.7 12/15/2021 01:29 PM    LYMPHOPCT 39.7 12/15/2021 01:29 PM    LYMPHOPCT 31.9 02/14/2020 01:34 PM    MONOPCT 6.7 12/15/2021 01:29 PM    EOSRELPCT 1.7 12/15/2021 01:29 PM    BASOPCT 0.2 12/15/2021 01:29 PM    NEUTROABS 2.8 12/15/2021 01:29 PM    LYMPHSABS 2.2 12/15/2021 01:29 PM    MONOSABS 0.4 12/15/2021 01:29 PM    EOSABS 0.1 12/15/2021 01:29 PM     Lab Results   Component Value Date/Time     05/24/2022 06:08 AM    K 4.1 05/24/2022 06:08 AM    K 3.8 11/13/2020 08:27 AM     05/24/2022 06:08 AM    CO2 18 05/24/2022 06:08 AM    ANIONGAP 17 05/24/2022 06:08 AM GLUCOSE 122 05/24/2022 06:08 AM    BUN 4 05/24/2022 06:08 AM    CREATININE 0.5 05/24/2022 06:08 AM    LABGLOM >60 05/24/2022 06:08 AM    GFRAA >60 05/24/2022 06:08 AM    CALCIUM 10.0 05/24/2022 06:08 AM    PROT 7.5 05/03/2022 03:45 PM    LABALBU 4.3 05/03/2022 03:45 PM    AGRATIO 1.3 05/03/2022 03:45 PM    BILITOT 0.9 05/03/2022 03:45 PM    ALKPHOS 65 05/03/2022 03:45 PM    ALT 18 05/03/2022 03:45 PM    AST 15 05/03/2022 03:45 PM    GLOB 3.4 07/29/2021 11:32 AM     Lab Results   Component Value Date/Time    CHOL 162 12/15/2021 01:29 PM    TRIG 92 12/15/2021 01:29 PM    HDL 45 12/15/2021 01:29 PM    LDLCALC 99 12/15/2021 01:29 PM    LABVLDL 18 12/15/2021 01:29 PM     Lab Results   Component Value Date/Time    TSHREFLEX 1.06 12/15/2021 01:29 PM     Lab Results   Component Value Date/Time    IRON 25 12/15/2021 01:29 PM    TIBC 382 12/15/2021 01:29 PM    LABIRON 7 12/15/2021 01:29 PM     Lab Results   Component Value Date/Time    KCDUPDZB57 557 12/15/2021 01:29 PM    FOLATE 15.09 12/15/2021 01:29 PM     Lab Results   Component Value Date/Time    VITD25 6.4 12/15/2021 01:29 PM     Lab Results   Component Value Date/Time    LABA1C 5.4 08/17/2022 08:55 AM    .1 12/15/2021 01:29 PM         Current Outpatient Medications:     venlafaxine (EFFEXOR XR) 37.5 MG extended release capsule, TAKE 1 CAPSULE BY MOUTH EVERY DAY, Disp: 60 capsule, Rfl: 3    PROAIR  (90 Base) MCG/ACT inhaler, TAKE 2 PUFFS BY MOUTH EVERY 6 HOURS AS NEEDED FOR WHEEZE, Disp: 8.5 each, Rfl: 3    cetirizine (ZYRTEC) 10 MG tablet, TAKE 1 TABLET BY MOUTH EVERY DAY, Disp: 30 tablet, Rfl: 4    omeprazole (PRILOSEC) 20 MG delayed release capsule, Take 1 capsule by mouth every morning (before breakfast), Disp: 90 capsule, Rfl: 1    ferrous sulfate (IRON 325) 325 (65 Fe) MG tablet, Take 1 tablet by mouth 2 times daily (with meals), Disp: 60 tablet, Rfl: 3    Benzoyl Peroxide (BENZAC AC) 10 % external wash, Apply topically 2 times daily. , Disp: 187 g, Rfl: 3    montelukast (SINGULAIR) 10 MG tablet, TAKE 1 TABLET BY MOUTH EVERY DAY AT NIGHT, Disp: 90 tablet, Rfl: 1    fluticasone (FLONASE) 50 MCG/ACT nasal spray, SHAKE LIQUID AND USE 1 SPRAY IN EACH NOSTRIL DAILY, Disp: 1 Bottle, Rfl: 2      Review of Systems - History obtained from the patient  General ROS: negative  Psychological ROS: negative  Ophthalmic ROS: negative  Neurological ROS: negative  ENT ROS: negative  Allergy and Immunology ROS: negative  Hematological and Lymphatic ROS: negative  Endocrine ROS: negative  Breast ROS: negative  Respiratory ROS: negative  Cardiovascular ROS: negative  Gastrointestinal ROS:negative  Genito-Urinary ROS: negative  Musculoskeletal ROS: negative   Skin ROS: negative    Physical Exam   Vitals Reviewed   Constitutional: Patient is oriented to person, place, and time. Patient appears well-developed and well-nourished. Patient is active and cooperative. Non-toxic appearance. No distress. HENT:   Head: Normocephalic and atraumatic. Head is without abrasion and without laceration. Hair is normal.   Right Ear: External ear normal. No lacerations. No drainage, swelling . Left Ear: External ear normal. No lacerations. No drainage, swelling. Mouth / Nose: face mask in place  Eyes: Conjunctivae, EOM and lids are normal. Right eye exhibits no discharge. No foreign body present in the right eye. Left eye exhibits no discharge. No foreign body present in the left eye. No scleral icterus. Neck: Trachea normal and normal range of motion. No JVD present. Pulmonary/Chest: Effort normal. No accessory muscle usage or stridor. No apnea. No respiratory distress. Cardiovascular: Normal rate and no JVD. Abdominal: Normal appearance. Patient exhibits no distension. Abdomen is soft, obese, non tender. Musculoskeletal: Normal range of motion. Patient exhibits no edema. Neurological: Patient is alert and oriented to person, place, and time. Patient has normal strength.  GCS eye subscore is 4. GCS verbal subscore is 5. GCS motor subscore is 6. Skin: Skin is warm and dry. No abrasion and no rash noted. Patient is not diaphoretic. No cyanosis or erythema. Psychiatric: Patient has a normal mood and affect. Speech is normal and behavior is normal. Cognition and memory are normal.       A/P:    Shelley Moy was seen today for bariatrics post op follow up. Diagnoses and all orders for this visit:    Morbid obesity with BMI of 40.0-44.9, adult ([facility] [address]    S/P laparoscopic sleeve gastrectomy    Obstructive sleep apnea    Chronic GERD    Prediabetes          Gene Gibbs is 28 y.o. female , now with Body mass index is 37.86 kg/m². s/p Sleeve gastrectomy, has lost 15 lbs since last visit, total of 52 lbs weight loss. The patient underwent dietary counseling with registered dietician. I have reviewed, discussed and agree with the dietary plan. Patient is trying hard to keep good dietary and behavior modifications. Patient is monitoring portion sizes, food choices and liquid calories. Patient is trying to exercise regularly. Patient pleased with the surgery outcomes. I encouraged the patient to continue exercise and keeping healthy eating habits. Also counseled the patient extensively on post surgery care. Total encounter time: 31 minutes, including any number of the following: Bariatric Post operative work up/protocols, review of labs, imaging, provider notes, outside hospital records, performing examination/evaluation, counseling patient and/or family, ordering medications/tests, placing referrals and communication with referring physicians, coordination of care; discussing dietary plan/recall with the patient as well with registered dietitian and documentation in the EHR. Of note, the above was done during same day of the actual patient encounter. Shelley Moy is here for her 3-1/2-month status post sleeve gastrectomy.   Overall doing well from weight loss standpoint review however has been stress eating. Patient also has been on several Facebook groups that has been unfortunately feeding her with wrong information. Advised the patient to get out of the support groups and delete them from her feet as soon as possible. Patient was advised to attend our support groups and other resources through the [place] and obesity coalition. Marcos Castro will benefit from seeing our Jesenia Rise, to work on behavioral aspects / changes to help with weight loss / maintenance. I did explain thoroughly to the patient that compliance with pre- and post op diet and other recommendations are integral part to improve the chances of successful weight loss and also not following it could end with serious health complications. Some strategies discussed today include but not limited to : 30/30/30 minutes rule, food diary, avoid fast food and packing/planing ahead, & increasing exercise. Also stressed to the patient importance of taking the multivitamins as instructed, otherwise risk significant complications. Obesity as a disease is considered a high risk to patients overall health and should therefore be considered a high risk disease state. Advised the patient that not getting there weight under control, which hopefully would help with getting some of the comorbidities under control. That could increase risk of complications/worsening of those conditions on the long-term. Now with Covid-19 pandemic, CDC and health authorities does classify obese patients as vulnerable and high risk as well. Which makes weight loss a priority for improvement of their wellbeing and overall health.      We discussed how her excess weight affects her overall health and importance of weight loss, healthy diet and active lifestyle to alleviate those co morbid conditions, otherwise risk deterioration.       - RTC in 2 months  - D/U with behaviorist           Patient advised that its their responsibility to follow up for care, studies, referrals and/or labs ordered today. Please note that some or all of this report was generated using voice recognition software. Please notify me in case of any questions about the content of this document, as some errors in transcription may have occurred .

## 2022-10-05 ENCOUNTER — OFFICE VISIT (OUTPATIENT)
Dept: BARIATRICS/WEIGHT MGMT | Age: 27
End: 2022-10-05
Payer: COMMERCIAL

## 2022-10-05 DIAGNOSIS — E66.9 OBESITY (BMI 35.0-39.9 WITHOUT COMORBIDITY): Primary | ICD-10-CM

## 2022-10-05 PROCEDURE — 90834 PSYTX W PT 45 MINUTES: CPT | Performed by: SOCIAL WORKER

## 2022-10-05 NOTE — PROGRESS NOTES
Heidi Ramirez is a 28 y.o. female who presents today for individual counseling encounter / psychosocial assessment related to behavioral health. Heidi Ramirez is presented engaged and oriented throughout assessment. Patient appeared with appropriate insight and cognition. Presenting Problem:   Patient states she is \"constantly battling old habits. After surgery and healing, it made me realize my connection with food is a lot deeper than I knew.' She feels as though she is \"stuck\" and not where she wants to be with weight loss. Home life / Family relationships / Supports:   Patient has been  for 2 years. She states her  is very supportive, although he prefers fast food if given a choice. Hobbies/Positives:   Patient enjoys shopping, garage sales. She states she is consistent with physical activity. Employment hx  Patient works from home, but reports she stays active throughout her work hours so she is not sedentary. Psychiatric hx  Patient takes medication for anxiety. Medication is managed by her PCP. Hx of emotional/stress/bored eating:   Patient realizes food is her go-to in times of stress and boredom. Daily Health Concerns/Limitations  Chronic skin concern, only causes stress when she is \"in a flare. \"    Substance Use:  None reported    Current needs / Limitations   \"I feel like I'm limiting myself, my lack of consistency with food. \"    Additional Questions/Concerns per patient  None stated at this time    Behaviorist overview:   Therapist utilized active listening and motivational interviewing skills to assess patient needs. Patient is motivated to be successful in her weight loss journey, but reports struggling with consistency. Therapist and patient co-designed goals. Patient is in agreement with goals.      Goals    None     Keep calorie intake to 1200 daily    Not eat after 8 pm (because that's where she tends to binge)      Patient and therapist spent majority of session discussing above goals and ways to achieve success with each goal. Follow up care has been discussed with patient in relation to goals and concerns addressed today. Same day cancellation/no show policy was discussed with patient per behaviorist guidelines. 50 minutes was spent in assessment and direct counseling with patient.      Merline Justice, LISW-S LISW

## 2022-11-10 RX ORDER — DOXYCYCLINE HYCLATE 100 MG
100 TABLET ORAL 2 TIMES DAILY
Qty: 20 TABLET | Refills: 0 | Status: SHIPPED | OUTPATIENT
Start: 2022-11-10 | End: 2022-11-20

## 2022-11-22 ENCOUNTER — OFFICE VISIT (OUTPATIENT)
Dept: BARIATRICS/WEIGHT MGMT | Age: 27
End: 2022-11-22
Payer: COMMERCIAL

## 2022-11-22 VITALS
SYSTOLIC BLOOD PRESSURE: 112 MMHG | BODY MASS INDEX: 36.83 KG/M2 | OXYGEN SATURATION: 98 % | HEART RATE: 88 BPM | HEIGHT: 68 IN | RESPIRATION RATE: 18 BRPM | WEIGHT: 243 LBS | DIASTOLIC BLOOD PRESSURE: 70 MMHG

## 2022-11-22 DIAGNOSIS — E66.9 OBESITY (BMI 30-39.9): ICD-10-CM

## 2022-11-22 DIAGNOSIS — Z98.84 S/P LAPAROSCOPIC SLEEVE GASTRECTOMY: Primary | ICD-10-CM

## 2022-11-22 DIAGNOSIS — G47.33 OBSTRUCTIVE SLEEP APNEA: ICD-10-CM

## 2022-11-22 PROBLEM — K21.9 CHRONIC GERD: Status: RESOLVED | Noted: 2021-11-30 | Resolved: 2022-11-22

## 2022-11-22 PROCEDURE — G8484 FLU IMMUNIZE NO ADMIN: HCPCS | Performed by: SURGERY

## 2022-11-22 PROCEDURE — G8417 CALC BMI ABV UP PARAM F/U: HCPCS | Performed by: SURGERY

## 2022-11-22 PROCEDURE — G8427 DOCREV CUR MEDS BY ELIG CLIN: HCPCS | Performed by: SURGERY

## 2022-11-22 PROCEDURE — 1036F TOBACCO NON-USER: CPT | Performed by: SURGERY

## 2022-11-22 PROCEDURE — 99214 OFFICE O/P EST MOD 30 MIN: CPT | Performed by: SURGERY

## 2022-11-22 NOTE — PROGRESS NOTES
Dietary Assessment Note      Vitals:   Vitals:    22 1052   BP: 112/70   Pulse: 88   Resp: 18   SpO2: 98%   Weight: 243 lb (110.2 kg)   Height: 5' 8\" (1.727 m)    Patient lost 6 lbs over 2 months. Total Weight Loss: 58 lbs    Labs reviewed: no lab studies available for review at time of visit    Protein intake: 60-80 grams/day     Fluid intake: 48-64 oz/day    Multivitamin/mineral intake: yes Fusion MVI capsule with iron     Calcium intake: yes 2 calcium chews     Other: none    Exercise: yes working out at gym 3 days/week, will have a  soon      Nutrition Assessment: 6 month s/p sleeve  post-op visit. Pt reports that she feels like she doesn't have much restriction. Pt states that she recently got diagnosed with PCOS and OBGYN recommended intermittent fasting from 7 pm. To 12 pm.   Pt has been stressed out lately due to watching nieces and nephews for a few weeks. Pt recently started metformin but stopped it due to having acid reflux. Breakfast: none  Snack: none  Lunch: 12 pm. [employer] protein bar    Snack: 1 pm.  Chicken thigh with a vegetable (brussels sprouts or greenbeans)  Snack: 3 pm.  Cashews- 1 cup total throughout the day   Dinner: 7:30-8:00 pm.  1.5 Chicken thigh and more vegetables or taco meat and cheese     Snack: none or sometimes sweets      Drinks:  water-48 oz/day      Amount able to eat per sittin oz. Total  of food     Following 30/30 rule: Yes for the most part; eats over 15 mins.       Food Intolerances/issues: none    Client Concerns: wanting to lose more weight, headaches sometimes       Goals:   Eat breakfast daily    Eat over 30 mins   Increase fluid to 64 oz/day   Limit nuts to 1/4-1/2 cup/day       Plan: F/U per 8 months     Demetrius Riley, MISSY, LD

## 2022-11-22 NOTE — PROGRESS NOTES
[facility]) Physicians   [employer] [facility] [employer]  Prescilla Gowers, MD, [facility] [address], [address] [facility] [address], [address] [facility]    [facility] [address] . Phone: [phone]  Fax: [phone]            Chief Complaint   Patient presents with    Bariatrics Post Op Follow Up     6mo s/p sleeve 5/23/22           HPI:    Heidi Ramirez is a very pleasant 28 y.o.  female , Body mass index is 36.95 kg/m². Maryanne Vogt And multiple medical problems who is presenting for bariatric follow up care. Lillie David is s/p laparoscopic sleeve gastrectomy by me 5/2022. Initial Weight: 301 lbs, Weight Loss: 58 lbs. Comes today to the clinic without any complaints. Patient denies any nausea, vomiting, fevers, chills, shortness of breath, chest pain, constipation or urinary symptoms. Denies any heartburn nor dysphagia. Patient informed us today that they are taking the multivitamins as instructed. Patient denies any tingling, weakness,  numbness nor any neurological symptoms. Lillie David is feeling very well, and is very active. Patient is very pleased with the weight loss and resolution of co-morbid conditions.       Pain Assessment   Denies any abdominal pain     Past Medical History:   Diagnosis Date    Allergic rhinitis     Anxiety     Asthma     Chronic GERD 11/30/2021    Chronic GERD 11/30/2021    Diabetes mellitus ([address]     Hidradenitis     History of prediabetes 2012    Iron deficiency anemia     Menorrhagia     Migraine     Obesity     Obstructive sleep apnea     cpap    Pre-operative clearance 10/27/2020    Prediabetes 2/17/2020     Past Surgical History:   Procedure Laterality Date    CHOLECYSTECTOMY      SLEEVE GASTRECTOMY N/A 5/23/2022    LAPAROSCOPIC SLEEVE GASTRECTOMY performed by Jo Ann Perez MD at [place] [address] N/A 5/6/2022    EGD BIOPSY performed by Jo Ann Perez MD at [address]     Family History   Problem Relation Age of Onset    Other Mother         uterine fibroids Diabetes Mother     Thyroid Disease Mother     Elevated Lipids Mother     Diabetes Father     Hypertension Father     Stroke Father 64    Sleep Apnea Father     Diabetes Other     Hypertension Other     Other Other         kidney disease (paternal uncle)    Other Maternal Grandmother         diverticulitis    Diabetes Maternal Grandmother     Other Paternal Grandmother         bone cancer     Heart Attack Paternal Grandmother     Colon Cancer Paternal Grandfather      Social History     Tobacco Use    Smoking status: Never    Smokeless tobacco: Never   Substance Use Topics    Alcohol use: Not Currently     Alcohol/week: 1.0 standard drink     Types: 1 Glasses of wine per week     Comment: belongs to a wine-club-  (once a month )      I counseled the patient on the importance of not smoking and risks of ETOH. Allergies   Allergen Reactions    Seasonal      Vitals:    11/22/22 1052   BP: 112/70   Pulse: 88   Resp: 18   SpO2: 98%   Weight: 243 lb (110.2 kg)   Height: 5' 8\" (1.727 m)       Body mass index is 36.95 kg/m².       Lab Results   Component Value Date/Time    WBC 13.9 05/24/2022 06:08 AM    RBC 4.91 05/24/2022 06:08 AM    RBC 4.87 02/14/2020 01:34 PM    HGB 12.8 05/24/2022 06:08 AM    HCT 39.6 05/24/2022 06:08 AM    MCV 80.6 05/24/2022 06:08 AM    MCH 26.1 05/24/2022 06:08 AM    MCHC 32.4 05/24/2022 06:08 AM    MPV 8.2 05/24/2022 06:08 AM    NEUTOPHILPCT 51.7 12/15/2021 01:29 PM    LYMPHOPCT 39.7 12/15/2021 01:29 PM    LYMPHOPCT 31.9 02/14/2020 01:34 PM    MONOPCT 6.7 12/15/2021 01:29 PM    EOSRELPCT 1.7 12/15/2021 01:29 PM    BASOPCT 0.2 12/15/2021 01:29 PM    NEUTROABS 2.8 12/15/2021 01:29 PM    LYMPHSABS 2.2 12/15/2021 01:29 PM    MONOSABS 0.4 12/15/2021 01:29 PM    EOSABS 0.1 12/15/2021 01:29 PM     Lab Results   Component Value Date/Time     05/24/2022 06:08 AM    K 4.1 05/24/2022 06:08 AM    K 3.8 11/13/2020 08:27 AM     05/24/2022 06:08 AM    CO2 18 05/24/2022 06:08 AM    ANIONGAP 17 05/24/2022 06:08 AM    GLUCOSE 122 05/24/2022 06:08 AM    BUN 4 05/24/2022 06:08 AM    CREATININE 0.5 05/24/2022 06:08 AM    LABGLOM >60 05/24/2022 06:08 AM    GFRAA >60 05/24/2022 06:08 AM    CALCIUM 10.0 05/24/2022 06:08 AM    PROT 7.5 05/03/2022 03:45 PM    LABALBU 4.3 05/03/2022 03:45 PM    AGRATIO 1.3 05/03/2022 03:45 PM    BILITOT 0.9 05/03/2022 03:45 PM    ALKPHOS 65 05/03/2022 03:45 PM    ALT 18 05/03/2022 03:45 PM    AST 15 05/03/2022 03:45 PM    GLOB 3.4 07/29/2021 11:32 AM     Lab Results   Component Value Date/Time    CHOL 162 12/15/2021 01:29 PM    TRIG 92 12/15/2021 01:29 PM    HDL 45 12/15/2021 01:29 PM    LDLCALC 99 12/15/2021 01:29 PM    LABVLDL 18 12/15/2021 01:29 PM     Lab Results   Component Value Date/Time    TSHREFLEX 1.06 12/15/2021 01:29 PM     Lab Results   Component Value Date/Time    IRON 25 12/15/2021 01:29 PM    TIBC 382 12/15/2021 01:29 PM    LABIRON 7 12/15/2021 01:29 PM     Lab Results   Component Value Date/Time    LNYTSEFS15 557 12/15/2021 01:29 PM    FOLATE 15.09 12/15/2021 01:29 PM     Lab Results   Component Value Date/Time    VITD25 6.4 12/15/2021 01:29 PM     Lab Results   Component Value Date/Time    LABA1C 5.4 08/17/2022 08:55 AM    .1 12/15/2021 01:29 PM         Current Outpatient Medications:     venlafaxine (EFFEXOR XR) 37.5 MG extended release capsule, TAKE 1 CAPSULE BY MOUTH EVERY DAY (Patient not taking: Reported on 11/22/2022), Disp: 60 capsule, Rfl: 3    PROAIR  (90 Base) MCG/ACT inhaler, TAKE 2 PUFFS BY MOUTH EVERY 6 HOURS AS NEEDED FOR WHEEZE (Patient not taking: Reported on 11/22/2022), Disp: 8.5 each, Rfl: 3    cetirizine (ZYRTEC) 10 MG tablet, TAKE 1 TABLET BY MOUTH EVERY DAY (Patient not taking: Reported on 11/22/2022), Disp: 30 tablet, Rfl: 4    omeprazole (PRILOSEC) 20 MG delayed release capsule, Take 1 capsule by mouth every morning (before breakfast) (Patient not taking: Reported on 11/22/2022), Disp: 90 capsule, Rfl: 1    ferrous sulfate (IRON 325) 325 (65 Fe) MG tablet, Take 1 tablet by mouth 2 times daily (with meals) (Patient not taking: Reported on 11/22/2022), Disp: 60 tablet, Rfl: 3    Benzoyl Peroxide (BENZAC AC) 10 % external wash, Apply topically 2 times daily. (Patient not taking: Reported on 11/22/2022), Disp: 187 g, Rfl: 3    montelukast (SINGULAIR) 10 MG tablet, TAKE 1 TABLET BY MOUTH EVERY DAY AT NIGHT (Patient not taking: Reported on 11/22/2022), Disp: 90 tablet, Rfl: 1    fluticasone (FLONASE) 50 MCG/ACT nasal spray, SHAKE LIQUID AND USE 1 SPRAY IN EACH NOSTRIL DAILY (Patient not taking: Reported on 11/22/2022), Disp: 1 Bottle, Rfl: 2      Review of Systems - History obtained from the patient  General ROS: negative  Psychological ROS: negative  Ophthalmic ROS: negative  Neurological ROS: negative  ENT ROS: negative  Allergy and Immunology ROS: negative  Hematological and Lymphatic ROS: negative  Endocrine ROS: negative  Breast ROS: negative  Respiratory ROS: negative  Cardiovascular ROS: negative  Gastrointestinal ROS:negative  Genito-Urinary ROS: negative  Musculoskeletal ROS: negative   Skin ROS: negative    Physical Exam   Vitals Reviewed   Constitutional: Patient is oriented to person, place, and time. Patient appears well-developed and well-nourished. Patient is active and cooperative. Non-toxic appearance. No distress. HENT:   Head: Normocephalic and atraumatic. Head is without abrasion and without laceration. Hair is normal.   Right Ear: External ear normal. No lacerations. No drainage, swelling . Left Ear: External ear normal. No lacerations. No drainage, swelling. Mouth / Nose: face mask in place  Eyes: Conjunctivae, EOM and lids are normal. Right eye exhibits no discharge. No foreign body present in the right eye. Left eye exhibits no discharge. No foreign body present in the left eye. No scleral icterus. Neck: Trachea normal and normal range of motion. No JVD present.    Pulmonary/Chest: Effort normal. No accessory muscle usage or stridor. No apnea. No respiratory distress. Cardiovascular: Normal rate and no JVD. Abdominal: Normal appearance. Patient exhibits no distension. Abdomen is soft, obese, non tender. Musculoskeletal: Normal range of motion. Patient exhibits no edema. Neurological: Patient is alert and oriented to person, place, and time. Patient has normal strength. GCS eye subscore is 4. GCS verbal subscore is 5. GCS motor subscore is 6. Skin: Skin is warm and dry. No abrasion and no rash noted. Patient is not diaphoretic. No cyanosis or erythema. Psychiatric: Patient has a normal mood and affect. Speech is normal and behavior is normal. Cognition and memory are normal.       A/P:    Emi was seen today for bariatrics post op follow up. Diagnoses and all orders for this visit:    S/P laparoscopic sleeve gastrectomy  -     CBC with Auto Differential; Future  -     Comprehensive Metabolic Panel; Future  -     Hemoglobin A1C; Future  -     Iron and TIBC; Future  -     Lipid Panel; Future  -     TSH with Reflex; Future  -     Vitamin A; Future  -     Vitamin B1, Whole Blood; Future  -     Vitamin B12 & Folate; Future  -     Vitamin D 25 Hydroxy; Future  -     Vitamin E; Future  -     Protime-INR; Future    Obstructive sleep apnea  -     CBC with Auto Differential; Future  -     Comprehensive Metabolic Panel; Future  -     Hemoglobin A1C; Future  -     Iron and TIBC; Future  -     Lipid Panel; Future  -     TSH with Reflex; Future  -     Vitamin A; Future  -     Vitamin B1, Whole Blood; Future  -     Vitamin B12 & Folate; Future  -     Vitamin D 25 Hydroxy; Future  -     Vitamin E; Future  -     Protime-INR; Future    Obesity (BMI 30-39.9)  -     CBC with Auto Differential; Future  -     Comprehensive Metabolic Panel; Future  -     Hemoglobin A1C; Future  -     Iron and TIBC; Future  -     Lipid Panel; Future  -     TSH with Reflex; Future  -     Vitamin A; Future  -     Vitamin B1, Whole Blood;  Future  - Vitamin B12 & Folate; Future  -     Vitamin D 25 Hydroxy; Future  -     Vitamin E; Future  -     Protime-INR; Future        Peggy Ates is 28 y.o. female , now with Body mass index is 36.95 kg/m². s/p Sleeve gastrectomy, has lost 6 lbs since last visit, total of 58 lbs weight loss. The patient underwent dietary counseling with registered dietician. I have reviewed, discussed and agree with the dietary plan. Patient is trying hard to keep good dietary and behavior modifications. Patient is monitoring portion sizes, food choices and liquid calories. Patient is trying to exercise regularly. Patient pleased with the surgery outcomes. I encouraged the patient to continue exercise and keeping healthy eating habits. Also counseled the patient extensively on post surgery care. Total encounter time: 31 minutes, including any number of the following: Bariatric Post operative work up/protocols, review of labs, imaging, provider notes, outside hospital records, performing examination/evaluation, counseling patient and/or family, ordering medications/tests, placing referrals and communication with referring physicians, coordination of care; discussing dietary plan/recall with the patient as well with registered dietitian and documentation in the EHR. Of note, the above was done during same day of the actual patient encounter. Flor Gonzalez is here for her 6-month status post sleeve gastrectomy doing well overall. Patient has no complaints. She staying active and working with a . Patient is very pleased with her weight loss. We will see the patient back in 2 months for continued follow-up. I did explain thoroughly to the patient that compliance with pre- and post op diet and other recommendations are integral part to improve the chances of successful weight loss and also not following it could end with serious health complications.    Some strategies discussed today include but not limited to : 30/30/30 minutes rule, food diary, avoid fast food and packing/planing ahead, & increasing exercise. Also stressed to the patient importance of taking the multivitamins as instructed, otherwise risk significant complications. Obesity as a disease is considered a high risk to patients overall health and should therefore be considered a high risk disease state. Advised the patient that not getting there weight under control, which hopefully would help with getting some of the comorbidities under control. That could increase risk of complications/worsening of those conditions on the long-term. Now with Covid-19 pandemic, CDC and health authorities does classify obese patients as vulnerable and high risk as well. Which makes weight loss a priority for improvement of their wellbeing and overall health. We discussed how her excess weight affects her overall health and importance of weight loss, healthy diet and active lifestyle to alleviate those co morbid conditions, otherwise risk deterioration.       - RTC in 2 months  - Nutrition labs         Patient advised that its their responsibility to follow up for care, studies, referrals and/or labs ordered today. Please note that some or all of this report was generated using voice recognition software. Please notify me in case of any questions about the content of this document, as some errors in transcription may have occurred .

## 2022-11-28 ENCOUNTER — HOSPITAL ENCOUNTER (OUTPATIENT)
Age: 27
Discharge: HOME | End: 2022-11-28
Payer: COMMERCIAL

## 2022-11-28 DIAGNOSIS — G47.33 OBSTRUCTIVE SLEEP APNEA: ICD-10-CM

## 2022-11-28 DIAGNOSIS — E66.9 OBESITY (BMI 30-39.9): ICD-10-CM

## 2022-11-28 DIAGNOSIS — Z98.84 S/P LAPAROSCOPIC SLEEVE GASTRECTOMY: ICD-10-CM

## 2022-11-28 LAB
A/G RATIO: 1.3 (ref 1.1–2.2)
ALBUMIN SERPL-MCNC: 4 G/DL (ref 3.4–5)
ALP BLD-CCNC: 72 U/L (ref 40–129)
ALT SERPL-CCNC: 14 U/L (ref 10–40)
ANION GAP SERPL CALCULATED.3IONS-SCNC: 11 MMOL/L (ref 3–16)
AST SERPL-CCNC: 16 U/L (ref 15–37)
BASOPHILS ABSOLUTE: 0 K/UL (ref 0–0.2)
BASOPHILS RELATIVE PERCENT: 0.7 %
BILIRUB SERPL-MCNC: 0.9 MG/DL (ref 0–1)
BUN BLDV-MCNC: 6 MG/DL (ref 7–20)
CALCIUM SERPL-MCNC: 9.7 MG/DL (ref 8.3–10.6)
CHLORIDE BLD-SCNC: 102 MMOL/L (ref 99–110)
CHOLESTEROL, TOTAL: 166 MG/DL (ref 0–199)
CO2: 26 MMOL/L (ref 21–32)
CREAT SERPL-MCNC: 0.5 MG/DL (ref 0.6–1.1)
EOSINOPHILS ABSOLUTE: 0.1 K/UL (ref 0–0.6)
EOSINOPHILS RELATIVE PERCENT: 1.7 %
FOLATE: 17.91 NG/ML (ref 4.78–24.2)
GFR SERPL CREATININE-BSD FRML MDRD: >60 ML/MIN/{1.73_M2}
GLUCOSE BLD-MCNC: 96 MG/DL (ref 70–99)
HCT VFR BLD CALC: 36.3 % (ref 36–48)
HDLC SERPL-MCNC: 56 MG/DL (ref 40–60)
HEMOGLOBIN: 11.7 G/DL (ref 12–16)
INR BLD: 1.05 (ref 0.87–1.14)
IRON SATURATION: 8 % (ref 15–50)
IRON: 30 UG/DL (ref 37–145)
LDL CHOLESTEROL CALCULATED: 93 MG/DL
LYMPHOCYTES ABSOLUTE: 1.9 K/UL (ref 1–5.1)
LYMPHOCYTES RELATIVE PERCENT: 29.6 %
MCH RBC QN AUTO: 25 PG (ref 26–34)
MCHC RBC AUTO-ENTMCNC: 32.2 G/DL (ref 31–36)
MCV RBC AUTO: 77.6 FL (ref 80–100)
MONOCYTES ABSOLUTE: 0.5 K/UL (ref 0–1.3)
MONOCYTES RELATIVE PERCENT: 7.3 %
NEUTROPHILS ABSOLUTE: 4 K/UL (ref 1.7–7.7)
NEUTROPHILS RELATIVE PERCENT: 60.7 %
PDW BLD-RTO: 15.1 % (ref 12.4–15.4)
PLATELET # BLD: 301 K/UL (ref 135–450)
PMV BLD AUTO: 9.1 FL (ref 5–10.5)
POTASSIUM SERPL-SCNC: 4.7 MMOL/L (ref 3.5–5.1)
PROTHROMBIN TIME: 13.6 SEC (ref 11.7–14.5)
RBC # BLD: 4.68 M/UL (ref 4–5.2)
SODIUM BLD-SCNC: 139 MMOL/L (ref 136–145)
TOTAL IRON BINDING CAPACITY: 355 UG/DL (ref 260–445)
TOTAL PROTEIN: 7.1 G/DL (ref 6.4–8.2)
TRIGL SERPL-MCNC: 86 MG/DL (ref 0–150)
TSH REFLEX: 2.58 UIU/ML (ref 0.27–4.2)
VITAMIN B-12: 899 PG/ML (ref 211–911)
VITAMIN D 25-HYDROXY: 48.8 NG/ML
VLDLC SERPL CALC-MCNC: 17 MG/DL
WBC # BLD: 6.5 K/UL (ref 4–11)

## 2022-11-28 PROCEDURE — 82746 ASSAY OF FOLIC ACID SERUM: CPT

## 2022-11-28 PROCEDURE — 36415 COLL VENOUS BLD VENIPUNCTURE: CPT

## 2022-11-28 PROCEDURE — 85025 COMPLETE CBC W/AUTO DIFF WBC: CPT

## 2022-11-28 PROCEDURE — 85610 PROTHROMBIN TIME: CPT

## 2022-11-28 PROCEDURE — 84425 ASSAY OF VITAMIN B-1: CPT

## 2022-11-28 PROCEDURE — 82607 VITAMIN B-12: CPT

## 2022-11-28 PROCEDURE — 84446 ASSAY OF VITAMIN E: CPT

## 2022-11-28 PROCEDURE — 80061 LIPID PANEL: CPT

## 2022-11-28 PROCEDURE — 80053 COMPREHEN METABOLIC PANEL: CPT

## 2022-11-28 PROCEDURE — 82306 VITAMIN D 25 HYDROXY: CPT

## 2022-11-28 PROCEDURE — 83550 IRON BINDING TEST: CPT

## 2022-11-28 PROCEDURE — 83036 HEMOGLOBIN GLYCOSYLATED A1C: CPT

## 2022-11-28 PROCEDURE — 84590 ASSAY OF VITAMIN A: CPT

## 2022-11-28 PROCEDURE — 84443 ASSAY THYROID STIM HORMONE: CPT

## 2022-11-28 PROCEDURE — 83540 ASSAY OF IRON: CPT

## 2022-11-29 LAB
ESTIMATED AVERAGE GLUCOSE: 108.3 MG/DL
HBA1C MFR BLD: 5.4 %

## 2022-11-30 LAB
ALPHA-TOCOPHEROL: 9.9 MG/L (ref 5.5–18)
GAMMA-TOCOPHEROL: 1.2 MG/L (ref 0–6)
RETINYL PALMITATE: 0.03 MG/L (ref 0–0.1)
VITAMIN A LEVEL: 0.37 MG/L (ref 0.3–1.2)
VITAMIN A, INTERP: NORMAL
VITAMIN B1 WHOLE BLOOD: 127 NMOL/L (ref 70–180)

## 2022-12-08 RX ORDER — DOXYCYCLINE HYCLATE 100 MG
100 TABLET ORAL 2 TIMES DAILY
Qty: 20 TABLET | Refills: 0 | Status: SHIPPED | OUTPATIENT
Start: 2022-12-08 | End: 2022-12-18

## 2023-01-19 ENCOUNTER — NURSE TRIAGE (OUTPATIENT)
Dept: OTHER | Facility: CLINIC | Age: 28
End: 2023-01-19

## 2023-01-19 ENCOUNTER — HOSPITAL ENCOUNTER (EMERGENCY)
Age: 28
Discharge: HOME | End: 2023-01-19

## 2023-01-19 VITALS
OXYGEN SATURATION: 100 % | DIASTOLIC BLOOD PRESSURE: 87 MMHG | RESPIRATION RATE: 18 BRPM | SYSTOLIC BLOOD PRESSURE: 154 MMHG | TEMPERATURE: 98.2 F | HEART RATE: 111 BPM

## 2023-01-19 ASSESSMENT — PAIN - FUNCTIONAL ASSESSMENT: PAIN_FUNCTIONAL_ASSESSMENT: 0-10

## 2023-01-19 ASSESSMENT — PAIN SCALES - GENERAL: PAINLEVEL_OUTOF10: 0

## 2023-01-19 NOTE — TELEPHONE ENCOUNTER
Hello,  Could one of the dietitians give her a call to review dietary intake in relation to nausea and stomach complaints. Her labs completed on 11/28/2022 looked good. The patient should follow up with her PCP and possibly a neurologist for the numbness and tingling as soon as possible. The RN who completed the previous note sent it to Dr. Mariana Buckley (Who is not her PCP) and did not send it to her actual PCP. It looks like patient's PCP is Genoveva Puri. JOSE DANIEL Valladares, but unable to send this note to her given she is outside [facility].   Thank you,  Jasmyn De La Cruz, JASSON-C

## 2023-01-19 NOTE — TELEPHONE ENCOUNTER
Location of patient: [place]    Received call from [place] at [facility]; Patient with Red Flag Complaint requesting to establish care with [facility].    Subjective: Caller states \"stomach issues and numbness in legs, hands and feet\"     Current Symptoms: symptoms randomly started 3 days ago, stomach issues no appetite nausea. Now has a fiery feeling in hands, feet and legs. Denies fever cough, or chest pain. Denies vomiting, diarrhea or constipation. Pain in bottom right side of stomach but she says its very mild, comes and goes. Had weight loss surgery in May. Denies headaches. Is fatigued often.    Onset: 3 days ago; sudden    Associated Symptoms: reduced appetite    Pain Severity: denies pain at this time.     Temperature: denies    What has been tried: nothing    LMP:  January 16th  Pregnant: No    Recommended disposition: Go to [facility]    Care advice provided, patient verbalizes understanding; denies any other questions or concerns; instructed to call back for any new or worsening symptoms.    Patient/Caller agrees with recommended disposition; writer provided warm transfer to [place] at [facility] for appointment scheduling    Attention Provider:  Thank you for allowing me to participate in the care of your patient.  The patient was connected to triage in response to information provided to the [facility].  Please do not respond through this encounter as the response is not directed to a shared pool.    Reason for Disposition   Tingling (e.g., pins and needles) of the face, arm or leg on one side of the body, that is present now (Exceptions: Chronic or recurrent symptom lasting > 4 weeks; or tingling from known cause, such as: bumped elbow, carpal tunnel syndrome, pinched nerve, frostbite.)    Protocols used: Neurologic Deficit-ADULT-[place]

## 2023-01-23 ENCOUNTER — PATIENT MESSAGE (OUTPATIENT)
Dept: BARIATRICS/WEIGHT MGMT | Age: 28
End: 2023-01-23

## 2023-01-23 DIAGNOSIS — Z98.84 S/P LAPAROSCOPIC SLEEVE GASTRECTOMY: Primary | ICD-10-CM

## 2023-01-23 DIAGNOSIS — R20.0 BILATERAL NUMBNESS AND TINGLING OF ARMS AND LEGS: Primary | ICD-10-CM

## 2023-01-23 DIAGNOSIS — R20.2 BILATERAL NUMBNESS AND TINGLING OF ARMS AND LEGS: Primary | ICD-10-CM

## 2023-01-23 NOTE — TELEPHONE ENCOUNTER
Dr. Cody Mayer who would you recommend for a neurologist? I will send a referral, but the ones I knew prior have since retired.   Thank you,  ALETHA Reyes

## 2023-01-24 ENCOUNTER — HOSPITAL ENCOUNTER (EMERGENCY)
Age: 28
Discharge: HOME | End: 2023-01-24
Attending: EMERGENCY MEDICINE
Payer: COMMERCIAL

## 2023-01-24 ENCOUNTER — HOSPITAL ENCOUNTER (OUTPATIENT)
Dept: ONCOLOGY | Age: 28
Setting detail: INFUSION SERIES
Discharge: HOME | End: 2023-01-24
Payer: COMMERCIAL

## 2023-01-24 VITALS
SYSTOLIC BLOOD PRESSURE: 173 MMHG | HEIGHT: 69 IN | TEMPERATURE: 98.5 F | DIASTOLIC BLOOD PRESSURE: 90 MMHG | BODY MASS INDEX: 36.43 KG/M2 | WEIGHT: 246 LBS | RESPIRATION RATE: 18 BRPM | OXYGEN SATURATION: 100 % | HEART RATE: 88 BPM

## 2023-01-24 VITALS
DIASTOLIC BLOOD PRESSURE: 92 MMHG | HEART RATE: 90 BPM | RESPIRATION RATE: 16 BRPM | TEMPERATURE: 97 F | SYSTOLIC BLOOD PRESSURE: 150 MMHG

## 2023-01-24 DIAGNOSIS — D50.9 HYPOCHROMIC MICROCYTIC ANEMIA: Primary | ICD-10-CM

## 2023-01-24 DIAGNOSIS — F41.9 ANXIETY: ICD-10-CM

## 2023-01-24 LAB
A/G RATIO: 1.1 (ref 1.1–2.2)
ALBUMIN SERPL-MCNC: 3.9 G/DL (ref 3.4–5)
ALP BLD-CCNC: 63 U/L (ref 40–129)
ALT SERPL-CCNC: 9 U/L (ref 10–40)
AMPHETAMINE SCREEN, URINE: NORMAL
ANION GAP SERPL CALCULATED.3IONS-SCNC: 10 MMOL/L (ref 3–16)
AST SERPL-CCNC: 12 U/L (ref 15–37)
BARBITURATE SCREEN URINE: NORMAL
BASOPHILS ABSOLUTE: 0 K/UL (ref 0–0.2)
BASOPHILS RELATIVE PERCENT: 0.7 %
BENZODIAZEPINE SCREEN, URINE: NORMAL
BILIRUB SERPL-MCNC: 0.9 MG/DL (ref 0–1)
BILIRUBIN URINE: NEGATIVE
BLOOD, URINE: NEGATIVE
BUN BLDV-MCNC: 8 MG/DL (ref 7–20)
CALCIUM SERPL-MCNC: 8.8 MG/DL (ref 8.3–10.6)
CANNABINOID SCREEN URINE: NORMAL
CHLORIDE BLD-SCNC: 106 MMOL/L (ref 99–110)
CLARITY: CLEAR
CO2: 25 MMOL/L (ref 21–32)
COCAINE METABOLITE SCREEN URINE: NORMAL
COLOR: YELLOW
CREAT SERPL-MCNC: 0.6 MG/DL (ref 0.6–1.1)
EKG ATRIAL RATE: 92 BPM
EKG DIAGNOSIS: NORMAL
EKG P AXIS: 28 DEGREES
EKG P-R INTERVAL: 176 MS
EKG Q-T INTERVAL: 342 MS
EKG QRS DURATION: 82 MS
EKG QTC CALCULATION (BAZETT): 422 MS
EKG R AXIS: 8 DEGREES
EKG T AXIS: 61 DEGREES
EKG VENTRICULAR RATE: 92 BPM
EOSINOPHILS ABSOLUTE: 0.1 K/UL (ref 0–0.6)
EOSINOPHILS RELATIVE PERCENT: 0.9 %
FENTANYL SCREEN, URINE: NORMAL
GFR SERPL CREATININE-BSD FRML MDRD: >60 ML/MIN/{1.73_M2}
GLUCOSE BLD-MCNC: 102 MG/DL (ref 70–99)
GLUCOSE URINE: NEGATIVE MG/DL
HCG(URINE) PREGNANCY TEST: NEGATIVE
HCT VFR BLD CALC: 32.9 % (ref 36–48)
HEMOGLOBIN: 10.6 G/DL (ref 12–16)
KETONES, URINE: ABNORMAL MG/DL
LEUKOCYTE ESTERASE, URINE: NEGATIVE
LYMPHOCYTES ABSOLUTE: 1.7 K/UL (ref 1–5.1)
LYMPHOCYTES RELATIVE PERCENT: 23.1 %
Lab: NORMAL
MCH RBC QN AUTO: 23.6 PG (ref 26–34)
MCHC RBC AUTO-ENTMCNC: 32.2 G/DL (ref 31–36)
MCV RBC AUTO: 73.2 FL (ref 80–100)
METHADONE SCREEN, URINE: NORMAL
MICROSCOPIC EXAMINATION: ABNORMAL
MONOCYTES ABSOLUTE: 0.6 K/UL (ref 0–1.3)
MONOCYTES RELATIVE PERCENT: 8.6 %
NEUTROPHILS ABSOLUTE: 4.8 K/UL (ref 1.7–7.7)
NEUTROPHILS RELATIVE PERCENT: 66.7 %
NITRITE, URINE: NEGATIVE
OPIATE SCREEN URINE: NORMAL
OXYCODONE URINE: NORMAL
PDW BLD-RTO: 14.7 % (ref 12.4–15.4)
PH UA: 7
PH UA: 7 (ref 5–8)
PHENCYCLIDINE SCREEN URINE: NORMAL
PLATELET # BLD: 351 K/UL (ref 135–450)
PMV BLD AUTO: 8.4 FL (ref 5–10.5)
POTASSIUM REFLEX MAGNESIUM: 3.8 MMOL/L (ref 3.5–5.1)
PROTEIN UA: NEGATIVE MG/DL
RBC # BLD: 4.5 M/UL (ref 4–5.2)
SODIUM BLD-SCNC: 141 MMOL/L (ref 136–145)
SPECIFIC GRAVITY UA: 1.02 (ref 1–1.03)
TOTAL CK: 50 U/L (ref 26–192)
TOTAL PROTEIN: 7.3 G/DL (ref 6.4–8.2)
URINE REFLEX TO CULTURE: ABNORMAL
URINE TYPE: ABNORMAL
UROBILINOGEN, URINE: 1 E.U./DL
WBC # BLD: 7.2 K/UL (ref 4–11)

## 2023-01-24 PROCEDURE — 96366 THER/PROPH/DIAG IV INF ADDON: CPT

## 2023-01-24 PROCEDURE — 2580000003 HC RX 258: Performed by: SURGERY

## 2023-01-24 PROCEDURE — 80307 DRUG TEST PRSMV CHEM ANLYZR: CPT

## 2023-01-24 PROCEDURE — 6360000002 HC RX W HCPCS: Performed by: SURGERY

## 2023-01-24 PROCEDURE — 93005 ELECTROCARDIOGRAM TRACING: CPT | Performed by: EMERGENCY MEDICINE

## 2023-01-24 PROCEDURE — 85025 COMPLETE CBC W/AUTO DIFF WBC: CPT

## 2023-01-24 PROCEDURE — 99211 OFF/OP EST MAY X REQ PHY/QHP: CPT

## 2023-01-24 PROCEDURE — 93010 ELECTROCARDIOGRAM REPORT: CPT | Performed by: INTERNAL MEDICINE

## 2023-01-24 PROCEDURE — 80053 COMPREHEN METABOLIC PANEL: CPT

## 2023-01-24 PROCEDURE — 81003 URINALYSIS AUTO W/O SCOPE: CPT

## 2023-01-24 PROCEDURE — 84703 CHORIONIC GONADOTROPIN ASSAY: CPT

## 2023-01-24 PROCEDURE — 96365 THER/PROPH/DIAG IV INF INIT: CPT

## 2023-01-24 PROCEDURE — 2500000003 HC RX 250 WO HCPCS: Performed by: SURGERY

## 2023-01-24 PROCEDURE — 82550 ASSAY OF CK (CPK): CPT

## 2023-01-24 RX ORDER — SODIUM CHLORIDE 0.9 % (FLUSH) 0.9 %
5-40 SYRINGE (ML) INJECTION ONCE
Status: COMPLETED | OUTPATIENT
Start: 2023-01-24 | End: 2023-01-24

## 2023-01-24 RX ADMIN — FOLIC ACID: 5 INJECTION, SOLUTION INTRAMUSCULAR; INTRAVENOUS; SUBCUTANEOUS at 10:10

## 2023-01-24 RX ADMIN — Medication 10 ML: at 10:08

## 2023-01-24 ASSESSMENT — PAIN - FUNCTIONAL ASSESSMENT: PAIN_FUNCTIONAL_ASSESSMENT: NONE - DENIES PAIN

## 2023-01-24 NOTE — ED PROVIDER NOTES
Emergency Physician Note  [address]    Pt Name: Taco Hernandez  MRN: [id number]  [place] 1995  Date of evaluation: 1/24/2023  Provider: Familia Mata MD  PCP: Isabella Tan MD    Note Open Time: 12:51 PM EST 1/24/23    Chief Complaint  Fatigue (Symptoms since last Wednesday, c/o headache, lack of appetite, tingling in extremities.)       History of Present Illness  Taco Hernandez is a 28 y.o. female who presents to the ED for multiple complaints. Patient complains of 6 days of intermittent paresthesias of both hands and legs and occasionally the face. She states that she also has a headache and has had loss of appetite. She complains also of muscle cramps intermittently. She was evaluated at [address] [address] at [facility] several days ago and the blood work was normal and she was advised to follow-up with primary care. She had weight loss surgery about a year ago and has never had any trouble with vitamin deficiencies but her doctor arranged for vitamin infusion today which she just completed prior to coming here. She has a history of anxiety treated with Zoloft and is unsure if her anxiety may be a part of this. She is also been very emotional and tearful this last week. She denies any chest pain or shortness of breath. No fevers, chills or sweats. She states she is concerned about a new diagnosis of MS given her strange and unusual symptoms. She came to this concern after checking her symptoms on Google. History from : Patient    Limitations to history : None    REVIEW OF SYSTEMS :      Review of Systems    Positives and Pertinent negatives as per HPI. Medications/allergies/medical/social/family history  I have reviewed the following from the nursing documentation:      Prior to Admission medications    Medication Sig Start Date End Date Taking?  Authorizing Provider   venlafaxine (EFFEXOR XR) 37.5 MG extended release capsule TAKE 1 CAPSULE BY MOUTH EVERY DAY  Patient not taking: No sig reported 6/27/22   Darcy Reyes MD   PROAIR  (50 Base) MCG/ACT inhaler TAKE 2 PUFFS BY MOUTH EVERY 6 HOURS AS NEEDED FOR WHEEZE  Patient not taking: No sig reported 5/17/22   CASPER Kyle CNP   cetirizine (ZYRTEC) 10 MG tablet TAKE 1 TABLET BY MOUTH EVERY DAY  Patient not taking: Reported on 11/22/2022 5/17/22   CASPER Kyle CNP   omeprazole (PRILOSEC) 20 MG delayed release capsule Take 1 capsule by mouth every morning (before breakfast)  Patient not taking: Reported on 11/22/2022 5/6/22   Deforest Riedel, MD   ferrous sulfate (IRON 325) 325 (65 Fe) MG tablet Take 1 tablet by mouth 2 times daily (with meals)  Patient not taking: Reported on 11/22/2022 2/7/22   CASPER Kyle CNP   Benzoyl Peroxide [facility]) 10 % external wash Apply topically 2 times daily.   Patient not taking: Reported on 11/22/2022 9/29/21   CASPER Kyle CNP   montelukast (SINGULAIR) 10 MG tablet TAKE 1 TABLET BY MOUTH EVERY DAY AT NIGHT  Patient not taking: Reported on 11/22/2022 9/29/21   CASPER Kyle CNP   fluticasone (FLONASE) 50 MCG/ACT nasal spray SHAKE LIQUID AND USE 1 SPRAY IN [facility] DAILY  Patient not taking: Reported on 11/22/2022 5/24/19   Kourtney Mittal MD       Allergies as of 01/24/2023 - Fully Reviewed 01/24/2023   Allergen Reaction Noted    Seasonal  10/24/2018       Past Medical History:   Diagnosis Date    Allergic rhinitis     Anxiety     Asthma     Chronic GERD 11/30/2021    Chronic GERD 11/30/2021    Diabetes mellitus ([address]     Hidradenitis     History of prediabetes 2012    Iron deficiency anemia     Menorrhagia     Migraine     Obesity     Obstructive sleep apnea     cpap    Pre-operative clearance 10/27/2020    Prediabetes 2/17/2020        Surgical History:   Past Surgical History:   Procedure Laterality Date    CHOLECYSTECTOMY      SLEEVE GASTRECTOMY N/A 5/23/2022    LAPAROSCOPIC SLEEVE GASTRECTOMY performed by Sydnie Wong MD at [facility] N/A 5/6/2022    EGD BIOPSY performed by Sydnie Wong MD at [address] [place] [address]        Family History:    Family History   Problem Relation Age of Onset    Other Mother         uterine fibroids    Diabetes Mother     Thyroid Disease Mother     Elevated Lipids Mother     Diabetes Father     Hypertension Father     Stroke Father 64    Sleep Apnea Father     Diabetes Other     Hypertension Other     Other Other         kidney disease (paternal uncle)    Other Maternal Grandmother         diverticulitis    Diabetes Maternal Grandmother     Other Paternal Grandmother         bone cancer     Heart Attack Paternal Grandmother     Colon Cancer Paternal Grandfather        Social History     Socioeconomic History    Marital status:      Spouse name: Not on file    Number of children: 0    Years of education: Not on file    Highest education level: Not on file   Occupational History    Occupation: student     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     Occupation:    Tobacco Use    Smoking status: Never    Smokeless tobacco: Never   Vaping Use    Vaping Use: Never used   Substance and Sexual Activity    Alcohol use: Not Currently     Alcohol/week: 1.0 standard drink     Types: 1 Glasses of wine per week     Comment: belongs to a wine-club-  (once a month )     Drug use: No    Sexual activity: Yes     Partners: Male   Other Topics Concern    Not on file   Social History Narrative    Lives with mother and nephew     Social Determinants of Health     Financial Resource Strain: Not on file   Food Insecurity: Not on file   Transportation Needs: Not on file   Physical Activity: Not on file   Stress: Not on file   Social Connections: Not on file   Intimate Partner Violence: Not on file   Housing Stability: Not on file       Nursing notes reviewed.     Triage VS:  ED Triage Vitals [01/24/23 1235]   Enc Vitals Group      BP (!) 173/90      Heart Rate 88      Resp 18      Temp 98.5 °F (36.9 °C)      Temp Source Oral      SpO2 100 %      Weight 246 lb (111.6 kg)      Height 5' 9\" (1.753 m)      Head Circumference       Peak Flow       Pain Score       Pain Loc       Pain Edu? Excl. in [address]? GENERAL:  Awake, alert. Well developed, well nourished with no apparent distress. HENT:  Normocephalic, Atraumatic, moist mucous membranes. EYES:  Pupils equal round and reactive to light, Conjunctiva normal, extraocular movements normal.  NECK:  No meningeal signs, Supple. CHEST:  Regular rate and rhythm, chest wall non-tender. LUNGS:  Clear to auscultation bilaterally. ABDOMEN:  Soft, non-tender, no rebound, rigidity or guarding, non-distended, normal bowel sounds. No costovertebral angle tenderness to palpation. BACK:  No tenderness. EXTREMITIES:  Normal range of motion, no edema, no bony tenderness, no deformity, distal pulses present. SKIN: Warm, dry and intact. Virlinda Doheny, alert and oriented to person, place and time. Strength 5/5 in bilateral upper and lower extremities. Sensation is intact to light touch in the upper and lower extremities. Cranial Nerves 2-12 are intact. Patellar DTRs intact. Finger-to-nose intact.      DIAGNOSTIC RESULTS   LABS:  Labs Reviewed   COMPREHENSIVE METABOLIC PANEL W/ REFLEX TO MG FOR LOW K - Abnormal; Notable for the following components:       Result Value    Glucose 102 (*)     ALT 9 (*)     AST 12 (*)     All other components within normal limits   CBC WITH AUTO DIFFERENTIAL - Abnormal; Notable for the following components:    Hemoglobin 10.6 (*)     Hematocrit 32.9 (*)     MCV 73.2 (*)     MCH 23.6 (*)     All other components within normal limits   URINALYSIS WITH REFLEX TO CULTURE - Abnormal; Notable for the following components:    Ketones, Urine TRACE (*)     All other components within normal limits   CK   PREGNANCY, URINE URINE DRUG SCREEN       When ordered only abnormal lab results are displayed. All other labs were within normal range or not returned as of this dictation. EKG: The Ekg interpreted by me shows  normal sinus rhythm with a rate of 92  Axis is   Normal  QTc is  normal  Intervals and Durations are unremarkable. ST Segments: no acute change  No significant change from prior EKG dated 1/12/22        SCREENINGS        Sandee Coma Scale  Eye Opening: Spontaneous  Best Verbal Response: Oriented  Best Motor Response: Obeys commands  Sandee Coma Scale Score: 15                  CIWA Assessment  BP: (!) 173/90  Heart Rate: 88           PAST MEDICAL HISTORY    has a past medical history of Allergic rhinitis, Anxiety, Asthma, Chronic GERD (11/30/2021), Chronic GERD (11/30/2021), Diabetes mellitus ([facility] [address] [address] Hidradenitis, History of prediabetes (2012), Iron deficiency anemia, Menorrhagia, Migraine, Obesity, Obstructive sleep apnea, Pre-operative clearance (10/27/2020), and Prediabetes (2/17/2020). EMERGENCY DEPARTMENT COURSE and DIFFERENTIAL DIAGNOSIS/MDM:   ED medications:   ED Medication Orders (From admission, onward)      None            Vitals:    Vitals:    01/24/23 1235   BP: (!) 173/90   Pulse: 88   Resp: 18   Temp: 98.5 °F (36.9 °C)   TempSrc: Oral   SpO2: 100%   Weight: 246 lb (111.6 kg)   Height: 5' 9\" (1.753 m)       Chronic Conditions: preDiabetes, iron deficiency anemia, menorrhagia, GERD, postop weight loss surgery/gastric sleeve    Discussion with Other Profesionals : Consultant patient's bariatric surgeon, and Dr. Susan Tejada, called in and we discussed the patient's care. He agrees with the work-up to this point and he is the one that ordered her banana bag earlier today. Records Reviewed : External ED Note [facility] ER note and labs showing hemoglobin greater than 11      CC/HPI Summary, ED Course, and Reassessment: Patient is feeling better.   We discussed her paresthesias of the extremities and face which I believe are secondary to anxiety. We also discussed her fatigue which I believe is due to worsening anemia. She states she is taking iron supplement as prescribed but only since Friday. Advised her to give it more time and will likely be effective. I also advised her to continue taking multivitamin as well as vitamin C with her iron pills. Differential Diagnosis: Anemia, electrolyte disturbance, SAMEERA, ACS          I advised the patient to return to the emergency department immediately for any new or worsening symptoms, such as chest pain, shortness of breath or fever. The patient voiced agreement and understanding of the treatment plan. I am the Primary Clinician of Record. FINAL IMPRESSION      1. Hypochromic microcytic anemia    2. Anxiety          DISPOSITION/PLAN     Pt is in good condition upon Discharge to home. PATIENT REFERRED TO:  Humera Cota MD  [address] [place] [address]  [phone]    Schedule an appointment as soon as possible for a visit in 2 days      DISCHARGE MEDICATIONS:  Discharge Medication List as of 1/24/2023  3:14 PM          DISCONTINUED MEDICATIONS:  Discontinued Medications    No medications on file              (Please note that portions of this note were completed with a voice recognition program.  Efforts were made to edit the dictations but occasionally words are mis-transcribed.)    Donald Estrada MD (electronically signed)          This chart was generated using the [address] [place] [address] [facility] dictation system. I created this record but it may contain dictation errors.           Donald Estrada MD  01/24/23 5846

## 2023-01-24 NOTE — PROGRESS NOTES
Patient called today questioning the iv vitamin infusion and instructions for stopping in to get the fusion capsules. Verified with provider and he said yes patient was instructed to do both. Patient verbalized understanding when this was relayed via phone.

## 2023-01-24 NOTE — Clinical Note
Benson Sanford was seen and treated in our emergency department on 1/24/2023. She may return to work on 01/25/2023. If you have any questions or concerns, please don't hesitate to call.       Roman Vázquez MD

## 2023-01-24 NOTE — Clinical Note
Owen Tay was seen and treated in our emergency department on 1/24/2023. She may return to work on 01/25/2023. If you have any questions or concerns, please don't hesitate to call.       Марина Sharp MD

## 2023-01-24 NOTE — PROGRESS NOTES
Pt to Dept for Rally pack Infusion. Pt reporting numbness that starts in her feet that radiates up her body and has a feeling of \"icy hot\". AVS printed and reviewed. Pt donnell with no adverse reaction noted. Appt with Dr. Andrés Patterson today at 1:30.

## 2023-02-02 ENCOUNTER — OFFICE VISIT (OUTPATIENT)
Dept: INTERNAL MEDICINE CLINIC | Age: 28
End: 2023-02-02
Payer: COMMERCIAL

## 2023-02-02 VITALS
WEIGHT: 245.2 LBS | OXYGEN SATURATION: 99 % | HEART RATE: 106 BPM | DIASTOLIC BLOOD PRESSURE: 83 MMHG | SYSTOLIC BLOOD PRESSURE: 138 MMHG | BODY MASS INDEX: 36.21 KG/M2

## 2023-02-02 DIAGNOSIS — F41.1 GENERALIZED ANXIETY DISORDER: Primary | Chronic | ICD-10-CM

## 2023-02-02 DIAGNOSIS — F41.0 PANIC ATTACKS: Chronic | ICD-10-CM

## 2023-02-02 PROCEDURE — G8427 DOCREV CUR MEDS BY ELIG CLIN: HCPCS | Performed by: FAMILY MEDICINE

## 2023-02-02 PROCEDURE — G8417 CALC BMI ABV UP PARAM F/U: HCPCS | Performed by: FAMILY MEDICINE

## 2023-02-02 PROCEDURE — 1036F TOBACCO NON-USER: CPT | Performed by: FAMILY MEDICINE

## 2023-02-02 PROCEDURE — 99214 OFFICE O/P EST MOD 30 MIN: CPT | Performed by: FAMILY MEDICINE

## 2023-02-02 PROCEDURE — G8484 FLU IMMUNIZE NO ADMIN: HCPCS | Performed by: FAMILY MEDICINE

## 2023-02-02 SDOH — ECONOMIC STABILITY: INCOME INSECURITY: HOW HARD IS IT FOR YOU TO PAY FOR THE VERY BASICS LIKE FOOD, HOUSING, MEDICAL CARE, AND HEATING?: NOT HARD AT ALL

## 2023-02-02 SDOH — ECONOMIC STABILITY: FOOD INSECURITY: WITHIN THE PAST 12 MONTHS, THE FOOD YOU BOUGHT JUST DIDN'T LAST AND YOU DIDN'T HAVE MONEY TO GET MORE.: NEVER TRUE

## 2023-02-02 SDOH — HEALTH STABILITY: PHYSICAL HEALTH: ON AVERAGE, HOW MANY MINUTES DO YOU ENGAGE IN EXERCISE AT THIS LEVEL?: 40 MIN

## 2023-02-02 SDOH — ECONOMIC STABILITY: HOUSING INSECURITY
IN THE LAST 12 MONTHS, WAS THERE A TIME WHEN YOU DID NOT HAVE A STEADY PLACE TO SLEEP OR SLEPT IN A SHELTER (INCLUDING NOW)?: NO

## 2023-02-02 SDOH — HEALTH STABILITY: PHYSICAL HEALTH: ON AVERAGE, HOW MANY DAYS PER WEEK DO YOU ENGAGE IN MODERATE TO STRENUOUS EXERCISE (LIKE A BRISK WALK)?: 3 DAYS

## 2023-02-02 SDOH — ECONOMIC STABILITY: FOOD INSECURITY: WITHIN THE PAST 12 MONTHS, YOU WORRIED THAT YOUR FOOD WOULD RUN OUT BEFORE YOU GOT MONEY TO BUY MORE.: NEVER TRUE

## 2023-02-02 ASSESSMENT — PATIENT HEALTH QUESTIONNAIRE - PHQ9
2. FEELING DOWN, DEPRESSED OR HOPELESS: 0
SUM OF ALL RESPONSES TO PHQ QUESTIONS 1-9: 0
2. FEELING DOWN, DEPRESSED OR HOPELESS: 0
1. LITTLE INTEREST OR PLEASURE IN DOING THINGS: 0
DEPRESSION UNABLE TO ASSESS: PT REFUSES
SUM OF ALL RESPONSES TO PHQ9 QUESTIONS 1 & 2: 0
SUM OF ALL RESPONSES TO PHQ9 QUESTIONS 1 & 2: 0
SUM OF ALL RESPONSES TO PHQ QUESTIONS 1-9: 0
DEPRESSION UNABLE TO ASSESS: PT REFUSES
1. LITTLE INTEREST OR PLEASURE IN DOING THINGS: 0

## 2023-02-02 ASSESSMENT — SOCIAL DETERMINANTS OF HEALTH (SDOH)
WITHIN THE LAST YEAR, HAVE TO BEEN RAPED OR FORCED TO HAVE ANY KIND OF SEXUAL ACTIVITY BY YOUR PARTNER OR EX-PARTNER?: NO
WITHIN THE LAST YEAR, HAVE YOU BEEN KICKED, HIT, SLAPPED, OR OTHERWISE PHYSICALLY HURT BY YOUR PARTNER OR EX-PARTNER?: NO
WITHIN THE LAST YEAR, HAVE YOU BEEN AFRAID OF YOUR PARTNER OR EX-PARTNER?: NO
WITHIN THE LAST YEAR, HAVE YOU BEEN HUMILIATED OR EMOTIONALLY ABUSED IN OTHER WAYS BY YOUR PARTNER OR EX-PARTNER?: NO

## 2023-02-02 ASSESSMENT — ANXIETY QUESTIONNAIRES
7. FEELING AFRAID AS IF SOMETHING AWFUL MIGHT HAPPEN: 2
GAD7 TOTAL SCORE: 12
1. FEELING NERVOUS, ANXIOUS, OR ON EDGE: 2
4. TROUBLE RELAXING: 2
2. NOT BEING ABLE TO STOP OR CONTROL WORRYING: 3
6. BECOMING EASILY ANNOYED OR IRRITABLE: 0
3. WORRYING TOO MUCH ABOUT DIFFERENT THINGS: 3
IF YOU CHECKED OFF ANY PROBLEMS ON THIS QUESTIONNAIRE, HOW DIFFICULT HAVE THESE PROBLEMS MADE IT FOR YOU TO DO YOUR WORK, TAKE CARE OF THINGS AT HOME, OR GET ALONG WITH OTHER PEOPLE: SOMEWHAT DIFFICULT
5. BEING SO RESTLESS THAT IT IS HARD TO SIT STILL: 0

## 2023-02-02 NOTE — PROGRESS NOTES
Rizwana Jara (:  1995) is a 28 y.o. female,New patient, here for evaluation of the following chief complaint(s):  Established New Doctor      SUBJECTIVE:  2.2.23 - new patient appt    Patient states that since the  -- extreme fatigue and exhaustion. Felt like she had icy hot in her legs and arms -- for 2 days. Came out of nowhere. Last period was 4 days prior to this. Her symptoms persisted for a few days, radiated up and down her body. This sent her into panic, so she went to ER and was told to follow with neurology. Was told in the ER that she could have vitamin deficiency, as she stopped taking her bariatric nutritional supplements over the winter holidays. Weight loss doc was consulted and did not think that was cause, but she was given an IV infusion of vitamins anyway. Her gastric sleeve was completed in May 2022. Tingling in her arms and legs has gotten better, not all day, but does come and go randomly. This triggers her anxiety and then it worsenes the symptoms. She also admits that she has googled her symptoms and she knows that is probably not helping, but she did it anyway. Is on anxiety med since 2016, is on effexor 37.5 -- psychiatrist thinks she may need to get things under control and possibly increase her dose to 75 mg but she was resistant to agreeing to have this increase from her psychiatrist.     Discussed the symptoms that she has may truly be anxiety related, especially since they come and go and her labs was reassuring. She also admits that she is feeling better overall, and feels better just hearing that she is not going to have a heart attack or a stroke. - discussed that she should follow closely with her psychiatrist and can follow with me afterwards to see how her symptoms have progressed. - labs from ER reviewed. I have reviewed the chart notes available from myself and other providers.  I have reviewed and addressed all active problems and created or updated the problems list in detail, as needed    I have extensively reviewed and reconciled the medication list, discontinued medications not taking or no longer appropriate, and updated the active meds list    OBJECTIVE:  Review of Systems   Constitutional:  Negative for chills and fever. Respiratory:  Negative for cough and shortness of breath. Cardiovascular:  Negative for leg swelling. Gastrointestinal:  Negative for constipation, diarrhea, nausea and vomiting. Endocrine: Negative for polyuria. Genitourinary:  Negative for frequency. Skin:  Negative for rash. Psychiatric/Behavioral:  The patient is nervous/anxious. Vitals:    02/02/23 1102   BP: 138/83   Site: Left Upper Arm   Position: Sitting   Cuff Size: Medium Adult   Pulse: (!) 106   SpO2: 99%   Weight: 245 lb 3.2 oz (111.2 kg)      Body mass index is 36.21 kg/m². Physical Exam  Constitutional:       Appearance: Normal appearance. Cardiovascular:      Rate and Rhythm: Normal rate and regular rhythm. Pulses: Normal pulses. Heart sounds: Normal heart sounds. Pulmonary:      Effort: Pulmonary effort is normal.      Breath sounds: Normal breath sounds. Musculoskeletal:      Right lower leg: No edema. Left lower leg: No edema. Neurological:      General: No focal deficit present. Mental Status: She is alert. Mental status is at baseline.          Lab Results   Component Value Date    LABA1C 5.4 11/28/2022     Lab Results   Component Value Date    WBC 7.2 01/24/2023    HGB 10.6 (L) 01/24/2023    HCT 32.9 (L) 01/24/2023    MCV 73.2 (L) 01/24/2023     01/24/2023      Lab Results   Component Value Date/Time     01/24/2023 01:00 PM    K 3.8 01/24/2023 01:00 PM     01/24/2023 01:00 PM    CO2 25 01/24/2023 01:00 PM    BUN 8 01/24/2023 01:00 PM    CREATININE 0.6 01/24/2023 01:00 PM    GLUCOSE 102 01/24/2023 01:00 PM    CALCIUM 8.8 01/24/2023 01:00 PM       Lab Results Component Value Date    CHOL 166 11/28/2022    CHOL 162 12/15/2021    CHOL 148 11/02/2018     Lab Results   Component Value Date    TRIG 86 11/28/2022    TRIG 92 12/15/2021    TRIG 116 11/02/2018     Lab Results   Component Value Date    HDL 56 11/28/2022    HDL 45 12/15/2021    HDL 42 11/02/2018     Lab Results   Component Value Date    LDLCHOLESTEROL 83 11/02/2018    LDLCALC 93 11/28/2022    LDLCALC 99 12/15/2021    LDLCALC 94 04/17/2017     Lab Results   Component Value Date    LABVLDL 17 11/28/2022    LABVLDL 18 12/15/2021    LABVLDL 13 04/17/2017     Lab Results   Component Value Date    CHOLHDLRATIO 4 11/02/2018        The ASCVD Risk score (Theo JOHNSON, et al., 2019) failed to calculate for the following reasons: The 2019 ASCVD risk score is only valid for ages 36 to 78     Patient received counseling and, if relevant, printed instructions for all symptoms listed in CC and HPI, as well as for all diagnoses brought onto today's visit note below. Typical counseling includes, but is not limited to, non-pharmacologic measures to manage listed symptoms and conditions; appropriate use, risks and benefits for all prescribed medications; potential interactions between medications both prescribed and OTC; diet; exercise; healthy behaviors; and goalsetting to improve health. Patient or responsible party was involved in shared decision making and had opportunity to have all questions answered. Except as noted below, all chronic problems have been reviewed and are stable to continue medications or other therapy as previously documented in the patient's chart, with changes per orders or documentation below:    1. Generalized anxiety disorder  Comments:  is following with psychiatrist, currently taking 37.5 mg of effexor daily   2. Panic attacks  Comments:  discussed treatment, pt on effexor.  declined further meds at this time        Problem List          Unprioritized    Generalized anxiety disorder - Primary Relevant Medications    venlafaxine (EFFEXOR XR) 37.5 MG extended release capsule     No orders of the defined types were placed in this encounter. Return in about 6 weeks (around 3/16/2023) for anxiety symptom follow up. Dr. Geetha Hassan and [address] [place] [address] - Internal Medicine and Pediatrics        Usual doctor's hours are:     Monday 7:30 am to 6:00 pm           Tuesday  7:30 am to 5:00 pm                                               Wednesday 7:30 am to 5:00 pm                                               Thursday 7:30 am to 5:00 pm                                               Friday 7:30 am to 4:00 pm           Saturdays, Sundays, and after hours: E-Visits are available    We observe most federal holidays and Good Friday. We ask that you only contact the office one time per issue or question, and please allow one full business day for a call back. Calling us back multiple times keeps us from being able to complete the work efficiently for you and our other patients. For medication renewals, please call your pharmacist to contact us, and be sure to allow at least 3 business days for processing before you need to  your medication. If you are sick or need an appointment that hasn't been planned, same day appointments are available every day the office is open: Monday, Tuesday, Wednesday, Thursday, and Friday. Call during office hours to schedule, even if it may not be with your regular physician. You may also call the office after 8 am on office days if you need to be seen from an issue the night before. During hours when the office is not normally open, your call will go to the messaging service which cannot provide any service other than paging the doctor. No prescriptions or other nonurgent matters will be handled and no voicemail is available, so please call back during office hours for these matters.        Electronically signed by Jacquelyn Fritz DO Francis on 2/2/2023 at 8:11 AM.

## 2023-02-08 ASSESSMENT — ENCOUNTER SYMPTOMS
DIARRHEA: 0
CONSTIPATION: 0
SHORTNESS OF BREATH: 0
VOMITING: 0
COUGH: 0
NAUSEA: 0

## 2023-02-13 DIAGNOSIS — R20.2 PARESTHESIA: Primary | ICD-10-CM

## 2023-02-17 ENCOUNTER — OFFICE VISIT (OUTPATIENT)
Dept: FAMILY MEDICINE CLINIC | Age: 28
End: 2023-02-17

## 2023-02-17 VITALS
HEIGHT: 69 IN | BODY MASS INDEX: 35.7 KG/M2 | RESPIRATION RATE: 18 BRPM | WEIGHT: 241 LBS | HEART RATE: 120 BPM | DIASTOLIC BLOOD PRESSURE: 82 MMHG | SYSTOLIC BLOOD PRESSURE: 120 MMHG | OXYGEN SATURATION: 100 %

## 2023-02-17 DIAGNOSIS — R63.0 LOSS OF APPETITE: ICD-10-CM

## 2023-02-17 DIAGNOSIS — R20.2 PARESTHESIA OF SKIN: ICD-10-CM

## 2023-02-17 DIAGNOSIS — Z09 HOSPITAL DISCHARGE FOLLOW-UP: Primary | ICD-10-CM

## 2023-02-17 DIAGNOSIS — Z76.89 ENCOUNTER TO ESTABLISH CARE: ICD-10-CM

## 2023-02-17 DIAGNOSIS — R70.0 ELEVATED SED RATE: ICD-10-CM

## 2023-02-17 DIAGNOSIS — F41.1 GENERALIZED ANXIETY DISORDER: ICD-10-CM

## 2023-02-17 DIAGNOSIS — M54.50 ACUTE MIDLINE LOW BACK PAIN WITHOUT SCIATICA: ICD-10-CM

## 2023-02-17 DIAGNOSIS — K62.89 RECTAL PAIN: ICD-10-CM

## 2023-02-17 RX ORDER — ERGOCALCIFEROL 1.25 MG/1
50000 CAPSULE ORAL WEEKLY
COMMUNITY
Start: 2023-02-12

## 2023-02-17 RX ORDER — MULTIVITAMIN
TABLET ORAL
COMMUNITY
Start: 2023-01-20

## 2023-02-17 RX ORDER — HYDROXYZINE PAMOATE 50 MG/1
CAPSULE ORAL
COMMUNITY
Start: 2023-01-20

## 2023-02-17 ASSESSMENT — ENCOUNTER SYMPTOMS
DIARRHEA: 0
EYE DISCHARGE: 0
ABDOMINAL PAIN: 0
COUGH: 0
NAUSEA: 0
CHEST TIGHTNESS: 0
CONSTIPATION: 0
SINUS PRESSURE: 0
SHORTNESS OF BREATH: 0
COLOR CHANGE: 0
ABDOMINAL DISTENTION: 0
SINUS PAIN: 0
BACK PAIN: 1

## 2023-02-17 NOTE — PATIENT INSTRUCTIONS
Primary Location  [facility] [place], [address] [place] [address]  [phone]  Rheumatology

## 2023-02-17 NOTE — PROGRESS NOTES
Date of Service:  2023    Nahomy Ybarra (:  1995) is a 28 y.o. female, here for evaluation of the following medical concerns:    Chief Complaint   Patient presents with    New Patient     Here to [facility] care    Follow-Up from MEDICAL/DENTAL FACILITY AT [facility]/u for weakness, admitted  discharged 02/10        [facility] Discharge Follow Up  Patient established care with Alfonzo Ma at another [facility] earlier this month, felt brushed off with her so she is now establishing care here today for follow up from hospital follow up. Patient was at [facility] primary care with Madison Duff prior to that. Patient was in and out of ER over the past week. Hospitalized one night for paresthesia. Patient was admitted for syncopy. Patient had MRI for lesions which was negative. Lots of bloodwork, elevated sed rate. Has neurology appts set up- July appt set up through [facility], an April one set up in [facility] through [facility] and one in [address] [place] [address] [id number] neurology in March but that one is pay out of pocket and says she does not have that kind of money. Patient says grandmother's brother has MS and a second cousin has MS. Some anemia on labs. Patient had extreme fatigue after finishing her cycle in January. Mild vitamin D deficiency. Sed rate 40. Patient has been on anxiety medication since she was in college, feels like what she has been feeling is much more than her anxiety. Numbness and tingling, odd muscle spasms and jerks- specifically on right side, sensation of icy hot and burning sensation, night sweats, pins and needles on body sporadically. Patient to follow up with neurology. Some brain fog and stumbling over words but thinks that could be some anxiety. Patient has also had a loss of appetite over the last 3 weeks. Down 5 lbs since this started. Terrible lower back pain since this started.  Rectal to tailbone pain, pressure in rectum, difficult to explain, same pain she had when the body lower half aching started. Not related to bowel movements that she can tell but difficult to tell because she had weight loss surgery in May 2022 and her BMs can be irregular. Patient overall just feels anxious and increased her anxiety medication because she overall just feels nervous and isn't sure what she is going on. She is mildly anemic and was encouraged to increase her iron levels. She has received a vitamin infusion as well during a hospitalization. Therapist gave her klonopin but she says she has not used it yet. She has hydroxyzine but says she has only used that a couple times, does not like feeling that outside of her body feeling. Has not even picked up the klonopin yet. Patient increased her effexor to 75 mg, for about 1 week now. Sees Dr Brandy Giron with [facility]    No known autoimmune disorders in the family. Some cancer in paternal side of the family. Review of Systems   Constitutional:  Negative for activity change, appetite change, fatigue, fever and unexpected weight change. HENT:  Negative for congestion, ear pain, sinus pressure and sinus pain. Eyes:  Negative for discharge and visual disturbance. Respiratory:  Negative for cough, chest tightness and shortness of breath. Cardiovascular:  Negative for chest pain, palpitations and leg swelling. Gastrointestinal:  Negative for abdominal distention, abdominal pain, constipation, diarrhea and nausea. Endocrine: Negative for cold intolerance, heat intolerance, polydipsia, polyphagia and polyuria. Genitourinary:  Negative for decreased urine volume, difficulty urinating, dysuria, flank pain, frequency and urgency. Musculoskeletal:  Positive for arthralgias and back pain. Negative for gait problem, joint swelling, myalgias and neck pain. Skin:  Negative for color change, rash and wound. Allergic/Immunologic: Negative for food allergies and immunocompromised state.    Neurological:  Positive for weakness, light-headedness and numbness. Negative for dizziness, tremors, speech difficulty and headaches. Hematological:  Negative for adenopathy. Does not bruise/bleed easily. Psychiatric/Behavioral:  Negative for confusion, decreased concentration, self-injury, sleep disturbance and suicidal ideas. The patient is not nervous/anxious. Prior to Visit Medications    Medication Sig Taking? Authorizing Provider   ergocalciferol (ERGOCALCIFEROL) 1.25 MG (12815 UT) capsule Take 50,000 Units by mouth once a week Yes Historical Provider, MD   hydrOXYzine pamoate (VISTARIL) 50 MG capsule TAKE 1 CAPSULE (50 MG TOTAL) BY MOUTH 3 TIMES A DAY AS NEEDED FOR ANXIETY. Yes Historical Provider, MD   Multiple Vitamin (DAILY VITES) TABS TAKE 1 TABLET BY MOUTH EVERY DAY Yes Historical Provider, MD   venlafaxine (EFFEXOR XR) 37.5 MG extended release capsule TAKE 1 CAPSULE BY MOUTH EVERY DAY  Patient taking differently: 75 mg TAKE 1 CAPSULE BY MOUTH EVERY DAY Yes Ivette Riggins MD   PROAIR  (88 Base) MCG/ACT inhaler TAKE 2 PUFFS BY MOUTH EVERY 6 HOURS AS NEEDED FOR WHEEZE Yes CASPER Person CNP   ferrous sulfate (IRON 325) 325 (65 Fe) MG tablet Take 1 tablet by mouth 2 times daily (with meals) Yes CASPER Person CNP        Social History     Tobacco Use    Smoking status: Never    Smokeless tobacco: Never   Substance Use Topics    Alcohol use: Not Currently     Alcohol/week: 1.0 standard drink     Types: 1 Glasses of wine per week     Comment: belongs to a wine-club-  (once a month )         Vitals:    02/17/23 0742   BP: 120/82   Site: Left Upper Arm   Position: Sitting   Cuff Size: Medium Adult   Pulse: (!) 120   Resp: 18   SpO2: 100%   Weight: 241 lb (109.3 kg)   Height: 5' 9\" (1.753 m)     Estimated body mass index is 35.59 kg/m² as calculated from the following:    Height as of this encounter: 5' 9\" (1.753 m). Weight as of this encounter: 241 lb (109.3 kg). Physical Exam  Vitals reviewed.    Constitutional: General: She is awake. Appearance: Normal appearance. She is well-developed and well-groomed. She is obese. She is not ill-appearing. HENT:      Head: Normocephalic and atraumatic. Right Ear: Hearing, tympanic membrane, ear canal and external ear normal.      Left Ear: Hearing, tympanic membrane, ear canal and external ear normal.      Nose: Nose normal.      Mouth/Throat:      Lips: Pink. Mouth: Mucous membranes are moist.      Pharynx: Oropharynx is clear. Eyes:      General: Lids are normal.      Extraocular Movements: Extraocular movements intact. Conjunctiva/sclera: Conjunctivae normal.      Pupils: Pupils are equal, round, and reactive to light. Neck:      Thyroid: No thyromegaly. Vascular: No carotid bruit. Cardiovascular:      Rate and Rhythm: Normal rate. Pulses:           Carotid pulses are 2+ on the right side and 2+ on the left side. Radial pulses are 2+ on the right side and 2+ on the left side. Posterior tibial pulses are 2+ on the right side and 2+ on the left side. Heart sounds: Normal heart sounds, S1 normal and S2 normal. No murmur heard. Pulmonary:      Effort: Pulmonary effort is normal.      Breath sounds: Normal breath sounds. Abdominal:      General: Bowel sounds are normal. There is no abdominal bruit. Palpations: Abdomen is soft. Tenderness: There is no abdominal tenderness. Genitourinary:     Comments: Deferred  Musculoskeletal:         General: Normal range of motion. Cervical back: Full passive range of motion without pain, normal range of motion and neck supple. Lumbar back: Negative right straight leg raise test and negative left straight leg raise test.      Right lower leg: No edema. Left lower leg: No edema. Lymphadenopathy:      Head:      Right side of head: No submental, submandibular, tonsillar, preauricular, posterior auricular or occipital adenopathy.       Left side of head: No submental, submandibular, tonsillar, preauricular, posterior auricular or occipital adenopathy. Cervical: No cervical adenopathy. Right cervical: No superficial, deep or posterior cervical adenopathy. Left cervical: No superficial, deep or posterior cervical adenopathy. Upper Body:      Right upper body: No supraclavicular adenopathy. Left upper body: No supraclavicular adenopathy. Skin:     General: Skin is warm and dry. Capillary Refill: Capillary refill takes less than 2 seconds. Neurological:      General: No focal deficit present. Mental Status: She is alert and oriented to person, place, and time. Mental status is at baseline. Sensory: Sensation is intact. Motor: Motor function is intact. Coordination: Coordination is intact. Gait: Gait is intact. Psychiatric:         Attention and Perception: Attention and perception normal.         Mood and Affect: Affect normal. Mood is anxious. Speech: Speech normal.         Behavior: Behavior normal. Behavior is cooperative. Thought Content: Thought content normal.         Cognition and Memory: Cognition and memory normal.         Judgment: Judgment normal.       ASSESSMENT/PLAN:  1. Hospital discharge follow-up  -     ME DISCHARGE MEDS RECONCILED W/ CURRENT OUTPATIENT MED LIST   Reviewed with patient  2. Encounter to establish care   Discussed office policies/practices/provider team   Reviewed medical, social, family history and medications   MyChart activation and usage discussed  3. Acute midline low back pain without sciatica   Discussed with patient, negative SLR   Stretching and exercises encouraged, pt feels like the pain is lower in her rectal/coccyx area  4.  Paresthesia of skin  -     External Referral To Rheumatology   Has visits scheduled with neurology in upcoming months   With elevated ESR, gave referral to [address] [place] [address] rheum, if [facility] not accepting new patients, refer to [facility] rheum is closing practice  5. Elevated sed rate  -     External Referral To Rheumatology   Has visits scheduled with neurology in upcoming months   With elevated ESR, gave referral to [facility], if [facility] not accepting new patients, refer to [facility] rheum is closing practice   Discussed with patient different causes of elevated sed rate   It is the patient's responsibility to call to schedule the referral/set up the appointment. We discussed this, and the patient was given the information on paper along with their AVS to contact the provider for the referral; the provider information was highlighted/circled for convenience. 6. Rectal pain  -     [facility] - Elverna Pallas, MD, Gastroenterology, [facility]   Patient requested referral for colonoscopy, discussed seeing GI first to discuss   It is the patient's responsibility to call to schedule the referral/set up the appointment. We discussed this, and the patient was given the information on paper along with their AVS to contact the provider for the referral; the provider information was highlighted/circled for convenience. 7. Generalized anxiety disorder    following with psychiatrist, currently taking 75 mg of effexor daily  Sees Dr Khadijah Hirsch with [facility]   Has order for klonopin but has not picked it up yet   Has script for hydroxyzine, has used a couple times  8. Loss of appetite    Follow up with specialists- GI and rheum   Pt has concern for MS, neurological disorder, autoimmune      Care Gaps Addressed  COVID booster vaccine recommended  Flu vaccine recommended - insurance restriction here      I have reviewed patient's pertinent medical history, relevant laboratory and imaging studies, and past/future health maintenance. Discussed with the patient the importance of adhering to their current medication regimen as directed.  Advised the patient that they should continue to work on eating a healthy balanced diet and staying active by exercising within their personal limits. Orders as listed above. Patient was advised to keep future appointments with their respective specialty care team(s). Patient had the opportunity to ask questions, all of which were answered to the best of my ability and with patient satisfaction. Patient understands and is agreeable with the care plan following today's visit. Patient is to schedule an appointment for any new or worsening symptoms. Go to ER for significant shortness of breath, chest pain, or uncontrolled pain or fever. I discussed with patient the risk and benefits of any medications that were prescribed today. I verified that the patient understands their medications, labs, and/or procedures. The patient is doing well with current medication regimen and does not have any barriers to adherence. The patient's self-management abilities are good. Return in about 3 months (around 5/17/2023) for Physical Exam and Fasting Labs. An electronic signature was used to authenticate this note.     --CASPER Decker - CNP on 2/17/2023 at 8:24 AM

## 2023-03-01 ENCOUNTER — PATIENT MESSAGE (OUTPATIENT)
Dept: NEUROLOGY | Age: 28
End: 2023-03-01

## 2023-03-01 ENCOUNTER — OFFICE VISIT (OUTPATIENT)
Dept: NEUROLOGY | Age: 28
End: 2023-03-01
Payer: COMMERCIAL

## 2023-03-01 VITALS
DIASTOLIC BLOOD PRESSURE: 85 MMHG | SYSTOLIC BLOOD PRESSURE: 128 MMHG | WEIGHT: 244 LBS | BODY MASS INDEX: 36.14 KG/M2 | HEIGHT: 69 IN | HEART RATE: 95 BPM

## 2023-03-01 DIAGNOSIS — Z98.84 S/P LAPAROSCOPIC SLEEVE GASTRECTOMY: ICD-10-CM

## 2023-03-01 DIAGNOSIS — R20.0 NUMBNESS: Primary | ICD-10-CM

## 2023-03-01 DIAGNOSIS — F41.1 GENERALIZED ANXIETY DISORDER: ICD-10-CM

## 2023-03-01 PROCEDURE — 1036F TOBACCO NON-USER: CPT | Performed by: PSYCHIATRY & NEUROLOGY

## 2023-03-01 PROCEDURE — G8417 CALC BMI ABV UP PARAM F/U: HCPCS | Performed by: PSYCHIATRY & NEUROLOGY

## 2023-03-01 PROCEDURE — 99203 OFFICE O/P NEW LOW 30 MIN: CPT | Performed by: PSYCHIATRY & NEUROLOGY

## 2023-03-01 PROCEDURE — G8484 FLU IMMUNIZE NO ADMIN: HCPCS | Performed by: PSYCHIATRY & NEUROLOGY

## 2023-03-01 PROCEDURE — G8427 DOCREV CUR MEDS BY ELIG CLIN: HCPCS | Performed by: PSYCHIATRY & NEUROLOGY

## 2023-03-01 NOTE — PROGRESS NOTES
The patient is a 28y.o. years old female who  was referred by CASPER Lemus - Nathen for consultation regarding new onset paresthesia    HPI:  The patient describes history of new onset paresthesia with muscle twitches and other multiple complaints over the last 3 to 4 months. Symptoms started gradually. History of gastric sleeve in May of last year. She was not consistent with her multivitamins and she even quit taking such vitamins for quite some time till last November when she was seen for above concerns. She received IV multivitamin fusion. Most of her blood test recently showed normal vitamin levels. She is now back on her oral vitamin replacement. She describes daily or intermittent spells of numbness tingling, hot sensation and occasional muscle twitches that affect different part of her body. No other relieving or aggravating factors. She has been under tremendous stress with concern of \"multiple sclerosis and ALS\". She is been having difficulties with sleep and insomnia because of anxiety. She denies any bladder or bowel issues, neck or back pain, weakness or muscle loss. She was admitted recently at [facility] last month where she had complete work-up with MRI brain and C-spine and extensive blood test for inflammatory markers which were unremarkable. Patient denies any other new symptoms today. No headache or visual change or chest pain. No dysphagia or dysarthria or confusion. Other review of system was unremarkable.         ROS : A 10-14 system review of constitutional, cardiovascular, respiratory, GI, eyes, , ENT, musculoskeletal, endocrine, skin, SHEENT, genitourinary, psychiatric and neurologic systems was obtained and updated today and is unremarkable except as mentioned in my HPI        Exam:   Constitutional:   Vitals:    03/01/23 1307   BP: 128/85   Site: Right Upper Arm   Pulse: 95   Weight: 244 lb (110.7 kg)   Height: 5' 9\" (1.753 m)       General appearance: Normal development and appear in no acute distress. Mental Status:   Oriented to person, place, problem, and time. Memory: Good immediate recall. Intact remote memory  Normal attention span and concentration. Language: intact naming, repeating and fluency   Good fund of Knowledge. Aware of current events and vocabulary   Cranial Nerves:   II: Visual fields: Full. Pupils: equal, round, reactive to light  III,IV,VI: Extra Ocular Movements are intact. No nystagmus  V: Facial sensation is intact  VII: Facial strength and movements: intact and symmetric  XII: Tongue movements are normal  Musculoskeletal: 5/5 in all 4 extremities. Tone: Normal tone. Reflexes: Symmetric 2+ in the arms and 2+ in the legs   Coordination: no pronator drift, no dysmetria with FNF and normal REM  Sensation: normal.  Gait/Posture: steady gait with normal posturing and station. Medical decision making:  I personally reviewed and updated social history, past medical history, medications, allergy, surgical history, and family history as documented in the patient's electronic health records. Labs and/or neuroimaging and other test results reviewed and discussed with the patient. Last A1c 5.4, TSH 2.5 and B12 899. Unremarkable CBC and CMP except for elevated blood sugar level. Reviewed notes from other physicians. Provided patient education regarding risk, benefits and treatment options as well as adherence to medication regimen and side effect from these medications. Assessment:     Diagnosis Orders   1. Numbness        2. S/P laparoscopic sleeve gastrectomy        3. Generalized anxiety disorder              Multiple psychosomatic complaints of unclear etiology to me. Mainly subjective. Exam today is completely unremarkable. Reviewed outside records from [address] [place] [address] where she was seen last month where she had complete work-up with MRI brain and C-spine which were unremarkable. Less likely MS or ALS.   No evidence of hyperreflexia or upper motor neuron disease. No need for EMG and NCV unless symptoms persist over the next 2 to 3 months to exclude possibility of underlying sensory neuropathy. Could be related to her missing her vitamin intake for several weeks. Continue current supplementation and follow blood levels.   Continue SSRI  Anxiety management  Improving sleep hygiene  Avoid excessive coffee or soda  Follow-up with me if symptoms worsen

## 2023-03-02 NOTE — TELEPHONE ENCOUNTER
From: Janey Gonsales  To: Dr. Henning Bough: 3/1/2023 4:05 PM EST  Subject: EMG Testing    Hi Dr. Lance Klein. I hope all is well I truly appreciate you for talking to my mom and I today.  It did bring me comfort however I would like to go ahead and schedule the EMG just to be on the safe side if possible

## 2023-03-15 ENCOUNTER — PROCEDURE VISIT (OUTPATIENT)
Dept: NEUROLOGY | Age: 28
End: 2023-03-15
Payer: COMMERCIAL

## 2023-03-15 DIAGNOSIS — R20.0 NUMBNESS AND TINGLING: Primary | ICD-10-CM

## 2023-03-15 DIAGNOSIS — R20.2 NUMBNESS AND TINGLING: Primary | ICD-10-CM

## 2023-03-15 PROCEDURE — 95910 NRV CNDJ TEST 7-8 STUDIES: CPT | Performed by: PSYCHIATRY & NEUROLOGY

## 2023-03-15 PROCEDURE — 95886 MUSC TEST DONE W/N TEST COMP: CPT | Performed by: PSYCHIATRY & NEUROLOGY

## 2023-03-15 NOTE — PROGRESS NOTES
Prerna Hay M.D. [facility]) Physicians/[facility]  Board Certified in [address] [facility] [address] [place] [address], [address] [place] [address]    EMG / NERVE CONDUCTION STUDY      PATIENT:  Temitope Lynn       DATE OF EM/15/23     YOB: 1995       REASON FOR EMG:   Numbness and tingling, muscle twitching  Please do EMG nerve conduction studies of the left upper and left lower extremities      REFERRING PHYSICIAN:  Shadia Miller MD  [address] [place] [address] Dr Pasquale Alvarado [address]     SUMMARY:   The left median motor and sensory nerve studies were normal.  The left ulnar motor nerve study had a slowing of conduction velocity across the elbow. The left ulnar sensory nerve study was normal.  The left peroneal and posterior tibial motor nerve studies were normal.  The left sural sensory nerve study was normal.  Needle EMG of several muscles in the left upper and lower extremities was normal.      CLINICAL DIAGNOSIS:  Unspecified neuropathy        EMG RESULTS:     This patient has a mild to moderate left ulnar nerve lesion at the elbow. There is no electrophysiological evidence for neuropathy or radiculopathy in the left lower extremity. There is no electrophysiological evidence for motor neuron disease.        ---------------------------------------------  Prerna Hay M.D.   Electromyographer / Neurologist

## 2023-04-27 ENCOUNTER — PATIENT MESSAGE (OUTPATIENT)
Dept: PRIMARY CARE CLINIC | Age: 28
End: 2023-04-27

## 2023-04-27 DIAGNOSIS — R25.3 TWITCHING: Primary | ICD-10-CM

## 2023-04-27 DIAGNOSIS — R25.2 MUSCLE CRAMP: ICD-10-CM

## 2023-04-28 NOTE — TELEPHONE ENCOUNTER
From: Tyshawn Montanez  To: Dr. Coffman [facility]: 4/27/2023 10:44 AM EDT  Subject: Neuromuscular specialist referral     Hi Dr. Brooke Christianson, I hope all is well. As you know Linda Overall been to multiple neurologist trying to figure out whats going on with my twitching . .as of recent (past month) Ed had horrible cramps on my leg and calf where I get twitching the most none of the N[place]ologist Ive seen seem to care I havent even heard back from third one since my last EMG. I would like to be referred to a neuromuscular specialist are you able to send me in a referral to one here in the city? Linda Overall been researching and came across Dr. Gregoria Méndez but I am open to who you think is best. I can no longer deal with this issue without receiving some sort of help. Any help is beyond greatly appreciated!

## 2023-05-12 ENCOUNTER — PATIENT MESSAGE (OUTPATIENT)
Dept: PRIMARY CARE CLINIC | Age: 28
End: 2023-05-12

## 2023-05-15 ENCOUNTER — OFFICE VISIT (OUTPATIENT)
Dept: PRIMARY CARE CLINIC | Age: 28
End: 2023-05-15
Payer: COMMERCIAL

## 2023-05-15 VITALS
RESPIRATION RATE: 16 BRPM | HEART RATE: 93 BPM | TEMPERATURE: 97.3 F | SYSTOLIC BLOOD PRESSURE: 120 MMHG | BODY MASS INDEX: 37.8 KG/M2 | WEIGHT: 256 LBS | DIASTOLIC BLOOD PRESSURE: 83 MMHG

## 2023-05-15 DIAGNOSIS — E66.01 MORBID OBESITY WITH BMI OF 40.0-44.9, ADULT (HCC): ICD-10-CM

## 2023-05-15 DIAGNOSIS — Z98.84 S/P LAPAROSCOPIC SLEEVE GASTRECTOMY: ICD-10-CM

## 2023-05-15 DIAGNOSIS — R20.2 PARESTHESIA: Primary | ICD-10-CM

## 2023-05-15 DIAGNOSIS — F41.1 GAD (GENERALIZED ANXIETY DISORDER): ICD-10-CM

## 2023-05-15 PROCEDURE — G8417 CALC BMI ABV UP PARAM F/U: HCPCS | Performed by: FAMILY MEDICINE

## 2023-05-15 PROCEDURE — G8428 CUR MEDS NOT DOCUMENT: HCPCS | Performed by: FAMILY MEDICINE

## 2023-05-15 PROCEDURE — 1036F TOBACCO NON-USER: CPT | Performed by: FAMILY MEDICINE

## 2023-05-15 PROCEDURE — 99214 OFFICE O/P EST MOD 30 MIN: CPT | Performed by: FAMILY MEDICINE

## 2023-05-15 RX ORDER — PREGABALIN 50 MG/1
50 CAPSULE ORAL 3 TIMES DAILY
Qty: 60 CAPSULE | Refills: 3 | Status: SHIPPED | OUTPATIENT
Start: 2023-05-15 | End: 2023-06-14

## 2023-05-15 RX ORDER — FEXOFENADINE HCL 180 MG/1
180 TABLET ORAL DAILY
Qty: 30 TABLET | Refills: 3 | Status: SHIPPED | OUTPATIENT
Start: 2023-05-15

## 2023-05-15 ASSESSMENT — ENCOUNTER SYMPTOMS
ABDOMINAL PAIN: 0
BLOOD IN STOOL: 0
CONSTIPATION: 0
DIARRHEA: 0
SHORTNESS OF BREATH: 0

## 2023-05-15 NOTE — PROGRESS NOTES
5/15/2023     Aldo De Anda (:  1995) is a 28 y.o. female, here for evaluation of the following medical concerns:      Patient presented to the office for regular follow-up. She still have intermittent tingling numbness of the right arm and still concerned about MS or ALS , seen by 2 neurologists in the past and has extensive neurological exam with negative finding. The patient would like a second opinion and referral to another neurologist.  She still have a lot of other  somatic symptoms including chills at night. , warm sometimes ,,swollen glands in the left side of the neck, denies oral problem including tonsillitis no ear problem denies Chest pain or shortness of breath denies urinary disturbance. She goes to to nurse practitioner  Ashwin Edwards, takes  Effexor XR 75 mg daily and Klonopin was recently added to the regimen. Review of Systems   Constitutional:  Negative for activity change and appetite change. Eyes:  Negative for visual disturbance. Respiratory:  Negative for shortness of breath. Cardiovascular:  Negative for chest pain and leg swelling. Gastrointestinal:  Negative for abdominal pain, blood in stool, constipation and diarrhea. Genitourinary:  Negative for difficulty urinating, frequency, hematuria, menstrual problem and urgency. Neurological:  Negative for dizziness and syncope. Psychiatric/Behavioral:  Negative for behavioral problems. Prior to Visit Medications    Medication Sig Taking? Authorizing Provider   fexofenadine (ALLEGRA) 180 MG tablet Take 1 tablet by mouth daily  Em Lama MD   pregabalin (LYRICA) 50 MG capsule Take 1 capsule by mouth 3 times daily for 30 days. Max Daily Amount: 150 mg Yes Em Lama MD   clonazePAM (KLONOPIN) 0.5 MG tablet Take 1 tablet by mouth daily as needed.  Yes Historical Provider, MD   fluticasone (FLONASE) 50 MCG/ACT nasal spray 1 spray by Each Nostril route daily  Em Lama MD   venlafaxine

## 2023-05-16 NOTE — TELEPHONE ENCOUNTER
From: Heidi Ramirez  To: Dr. Oksana Beltrán: 5/12/2023 10:34 AM EDT  Subject: Gerry Rosario, I hope all is well. As you know Aston Single been to multiple neurologist trying to figure out whats going on with my twitching . .as of recent (past month) Ed had horrible cramps on my leg and calf and now Im having issues with my wrist and pins and needles In right foot. I appreciate you sending a referral to the doctor I suggested are you able to send one to who you suggest ? This doctor isnt seeing new patients. I would greatly appreciate it.

## 2023-05-31 ENCOUNTER — PATIENT MESSAGE (OUTPATIENT)
Dept: PRIMARY CARE CLINIC | Age: 28
End: 2023-05-31

## 2023-06-01 DIAGNOSIS — J45.20 MILD INTERMITTENT ASTHMA WITHOUT COMPLICATION: ICD-10-CM

## 2023-06-01 RX ORDER — ALBUTEROL SULFATE 90 UG/1
2 AEROSOL, METERED RESPIRATORY (INHALATION) EVERY 6 HOURS PRN
Qty: 8.5 EACH | Refills: 3 | Status: SHIPPED | OUTPATIENT
Start: 2023-06-01

## 2023-06-01 NOTE — TELEPHONE ENCOUNTER
From: Shari Fung  To: Dr. Jayesh Méndez: 5/31/2023 11:40 PM EDT  Subject: Refills Meds    Hi Salas! I hope all is well. Just wanted to see if I can get a fill on my inhaler my asthma has flaired due to the weather change. Ed also noticed [place] been craving things like starch ice and potatoes which is usually a sign that my iron is low. Can I also get a refill on iron tabs I havent been taking them like I should Ed been taking them the same time as my multivitamin and I heard you cant do that or it wont absorb? Wondering if thats true ? Also I wanted to get your opinion on floradix liquid iron supplements? Would that be okay to take? Any insight is greatly appreciated!

## 2023-06-02 ENCOUNTER — APPOINTMENT (OUTPATIENT)
Dept: CT IMAGING | Age: 28
End: 2023-06-02
Payer: OTHER MISCELLANEOUS

## 2023-06-02 ENCOUNTER — APPOINTMENT (OUTPATIENT)
Dept: GENERAL RADIOLOGY | Age: 28
End: 2023-06-02
Payer: OTHER MISCELLANEOUS

## 2023-06-02 ENCOUNTER — HOSPITAL ENCOUNTER (EMERGENCY)
Age: 28
Discharge: HOME | End: 2023-06-02
Payer: OTHER MISCELLANEOUS

## 2023-06-02 VITALS
RESPIRATION RATE: 18 BRPM | HEART RATE: 98 BPM | WEIGHT: 257 LBS | OXYGEN SATURATION: 98 % | SYSTOLIC BLOOD PRESSURE: 126 MMHG | BODY MASS INDEX: 38.06 KG/M2 | HEIGHT: 69 IN | DIASTOLIC BLOOD PRESSURE: 88 MMHG | TEMPERATURE: 98.4 F
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
## 2023-06-02 DIAGNOSIS — S16.1XXA STRAIN OF NECK MUSCLE, INITIAL ENCOUNTER: ICD-10-CM

## 2023-06-02 DIAGNOSIS — M25.511 ACUTE PAIN OF RIGHT SHOULDER: ICD-10-CM

## 2023-06-02 PROCEDURE — 72125 CT NECK SPINE W/O DYE: CPT

## 2023-06-02 PROCEDURE — 99284 EMERGENCY DEPT VISIT MOD MDM: CPT

## 2023-06-02 PROCEDURE — 72128 CT CHEST SPINE W/O DYE: CPT

## 2023-06-02 PROCEDURE — 73030 X-RAY EXAM OF SHOULDER: CPT

## 2023-06-02 RX ORDER — IBUPROFEN 600 MG/1
600 TABLET ORAL 3 TIMES DAILY PRN
Qty: 30 TABLET | Refills: 0 | Status: SHIPPED | OUTPATIENT
Start: 2023-06-02

## 2023-06-02 ASSESSMENT — LIFESTYLE VARIABLES
HOW OFTEN DO YOU HAVE A DRINK CONTAINING ALCOHOL: NEVER
HOW MANY STANDARD DRINKS CONTAINING ALCOHOL DO YOU HAVE ON A TYPICAL DAY: PATIENT DOES NOT DRINK

## 2023-06-02 ASSESSMENT — PAIN - FUNCTIONAL ASSESSMENT: PAIN_FUNCTIONAL_ASSESSMENT: 0-10

## 2023-06-02 ASSESSMENT — PAIN DESCRIPTION - LOCATION: LOCATION: BACK

## 2023-06-02 ASSESSMENT — PAIN DESCRIPTION - ORIENTATION: ORIENTATION: RIGHT

## 2023-06-02 ASSESSMENT — PAIN SCALES - GENERAL: PAINLEVEL_OUTOF10: 5
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
## 2023-06-02 NOTE — DISCHARGE INSTRUCTIONS
Activity as tolerated, follow-up with your primary care physician in 3 to 7 days, return to ER for worsening symptoms or any other concerns.

## 2023-06-16 ENCOUNTER — PATIENT MESSAGE (OUTPATIENT)
Dept: PRIMARY CARE CLINIC | Age: 28
End: 2023-06-16

## 2023-06-16 DIAGNOSIS — R30.0 DYSURIA: Primary | ICD-10-CM

## 2023-06-18 ENCOUNTER — PATIENT MESSAGE (OUTPATIENT)
Dept: PRIMARY CARE CLINIC | Age: 28
End: 2023-06-18

## 2023-06-19 ASSESSMENT — ENCOUNTER SYMPTOMS
ALLERGIC/IMMUNOLOGIC NEGATIVE: 1
EYES NEGATIVE: 1
RESPIRATORY NEGATIVE: 1

## 2023-06-19 NOTE — TELEPHONE ENCOUNTER
From: Darien Ross  To: Dr. Burgos Loveland: 6/16/2023 9:13 PM EDT  Subject: Further Lab Testing Results    Hi Dr. Ruby Long. I would like to order additional bloodwork specialty to check my liver. I am constantly itching to the point its a distraction in my daily life. Its not in my head nor is it anxiety. I have nausea for weeks, fatigue, upper right stomach pain and constant itchy skin. My last bloodwork from my ER visit didnt test my liver I would like further testing please.

## 2023-06-19 NOTE — PATIENT INSTRUCTIONS
the stomach. Carbonated beverages release gas and can expand the stomach. Continue to keep temptation from your kitchen- Keep your pantry and kitchen cabinets cleaned out of those dangerous foods that might tempt you after surgery (chips, cookies, candy, etc.). Continue to increase your exercise program- Increase your daily physical activity. Aim for 5-6 days per week for 30 minutes. Walking is an easy way to get started with exercising. You want exercise to be a regular part of your life after surgery. Make sure you have a good support system- There will be many changes and adjustments to make after surgery. It is important to have a supportive friend, family member or co-worker, etc. with whom you can talk. Continue to attend [facility]) Weight Management support groups as they can be helpful in maintaining behaviors. In addition, it is the responsibility of the patient to schedule and follow up on labs and tests completed after surgery. Results will be reviewed at each visit. We recommend you have the following labs completed by your primary care doctor each year after bariatric surgery: Complete Blood Count (CBC), Complete Metabolic Panel (CMP), Iron Studies, HgbA1c, Lipid Panel, TSH (Thyroid), Vitamin A, Vitamin B1, Vitamin B12 & Folate and Vitamin D. Patient received dietary handouts and education.

## 2023-06-19 NOTE — TELEPHONE ENCOUNTER
From: Ladi Infante  To: Dr. Darshan Bravo: 6/18/2023 11:03 AM EDT  Subject: Additional Testing     I would also like my kidney levels to be checked again so am itchy horrible peeing excessively and have right side lower back pain I am fatigued beyond normalcy. Please, I need to figure out whats going on with me this is not my anxiety or in my head.  Can I please request further bloodwork to check these things

## 2023-06-21 ENCOUNTER — TELEMEDICINE (OUTPATIENT)
Dept: BARIATRICS/WEIGHT MGMT | Age: 28
End: 2023-06-21
Payer: COMMERCIAL

## 2023-06-21 VITALS — WEIGHT: 257 LBS | HEIGHT: 68 IN | BODY MASS INDEX: 38.95 KG/M2

## 2023-06-21 DIAGNOSIS — R73.03 PREDIABETES: Primary | ICD-10-CM

## 2023-06-21 DIAGNOSIS — Z98.84 S/P LAPAROSCOPIC SLEEVE GASTRECTOMY: ICD-10-CM

## 2023-06-21 DIAGNOSIS — R11.0 NAUSEA: ICD-10-CM

## 2023-06-21 DIAGNOSIS — K21.9 CHRONIC GERD: ICD-10-CM

## 2023-06-21 DIAGNOSIS — D50.8 OTHER IRON DEFICIENCY ANEMIA: ICD-10-CM

## 2023-06-21 DIAGNOSIS — G47.33 OSA (OBSTRUCTIVE SLEEP APNEA): ICD-10-CM

## 2023-06-21 PROCEDURE — 99215 OFFICE O/P EST HI 40 MIN: CPT | Performed by: NURSE PRACTITIONER

## 2023-06-21 RX ORDER — OMEPRAZOLE 20 MG/1
20 CAPSULE, DELAYED RELEASE ORAL
Qty: 90 CAPSULE | Refills: 1 | Status: SHIPPED | OUTPATIENT
Start: 2023-06-21

## 2023-06-21 ASSESSMENT — ENCOUNTER SYMPTOMS
CONSTIPATION: 1
ABDOMINAL PAIN: 1
NAUSEA: 1

## 2023-06-21 NOTE — PROGRESS NOTES
Dietary Assessment Note      Vitals:   Vitals:    06/21/23 0829   Weight: 257 lb (116.6 kg)   Height: 5' 8\" (1.727 m)    Patient gained 14 lbs over past 6 months.     Total Weight Loss: 44 lbs    Labs reviewed:      Latest Reference Range & Units Most Recent   Sodium 136 - 145 mmol/L 141  1/24/23 13:00   Potassium 3.5 - 5.1 mmol/L 3.8  1/24/23 13:00   Chloride 99 - 110 mmol/L 106  1/24/23 13:00   CO2 21 - 32 mmol/L 25  1/24/23 13:00   BUN,BUNPL 7 - 20 mg/dL 8  1/24/23 13:00   Creatinine 0.6 - 1.1 mg/dL 0.6  1/24/23 13:00   Anion Gap 3 - 16  10  1/24/23 13:00   Calcium, Ionized 1.12 - 1.32 mmol/L 1.12  2/23/14 06:30   Est, Glom Filt Rate >60  >60  1/24/23 13:00   GFR Non- >60  >60  5/24/22 06:08   eGFR African American >60 mL/min >60.0  2/14/20 13:34   GFR  >60  >60  5/24/22 06:08   EGFR IF NonAfrican American >60 mL/min >60.0  2/14/20 13:34   FOLATE, FOLAT 4.78 - 24.20 ng/mL 17.91  11/28/22 09:15   Lactic Acid 0.4 - 2.0 mmol/L 3.0 (H)  8/17/15 11:42   Glucose, Random 70 - 99 mg/dL 102 (H)  1/24/23 13:00   POC Glucose 70 - 99 mg/dl 110 (H)  5/24/22 07:19   CALCIUM, SERUM, 600181 8.3 - 10.6 mg/dL 8.8  1/24/23 13:00   Phosphorus 2.5 - 4.9 mg/dL 2.8  1/10/18 15:37   Total Protein 6.4 - 8.2 g/dL 7.3  1/24/23 13:00   (H): Data is abnormally high     Latest Reference Range & Units Most Recent   Hemoglobin A1C See comment % 5.4  11/28/22 09:15      Latest Reference Range & Units Most Recent   Alpha-Tocopherol 5.5 - 18.0 mg/L 9.9  11/28/22 09:15   RETINYL PALMITATE 0.00 - 0.10 mg/L 0.03  11/28/22 09:15   Vit D, 25-Hydroxy >=30 ng/mL 48.8  11/28/22 09:15   Vitamin A 0.30 - 1.20 mg/L 0.37  11/28/22 09:15   Vitamin A, Interp  Normal  11/28/22 09:15   VITAMIN B12  Rpt  2/14/20 13:34   Vitamin B-12 211 - 911 pg/mL 899  11/28/22 09:15   Rpt: View report in Results Review for more information     Latest Reference Range & Units Most Recent   Iron 37 - 145 ug/dL 30 (L)  11/28/22 09:15   Iron Saturation
being a TeleHealth encounter, evaluation of the following organ systems is limited: Vitals/Constitutional/EENT/Resp/CV/GI//MS/Neuro/Skin/Heme-Lymph-Imm. Assessment and Plan:   Patient is here via telemedicine and is 1 year 1 month s/p sleeve gastrectomy, up 14 lbs with a total weight loss of 44 lbs. The patient's current Body mass index is 39.08 kg/m². (6/21/23). She is doing ok, patient with complaints of nausea and discomfort with eating and drinking. Will order UGI to evaluate sleeve. If normal would recommend having Dr. Cristian Bailey evaluate her sleeve with an EGD. She is getting adequate fluids, but needs to do better with her protein on a daily basis. Upon reviewing diet with dietitian patient is drinking to much volume in too short of amount of time and needs to decrease by half. She states she is not eating fast, but is not consistently eating throughout the day. Patient said she was doing good after she saw us last and then she said in January she started having some nerve pain in her hands and feet and has seen multiple neurologist. She is taking Lyrica now. Labs have been checked and all vitamin levels were good including Vitamin B1 and Vitamin B12. Her CBC showed iron deficiency anemia. Will check iron studies and ferritin level. Discussed with patient following up with hematology, who she has seen in the past, as they may want her to do some iron infusions given the constipation she is already experiencing. This could explain the sluggishness she has been feeling lately. She is not exercising currently. Encouraged physical activity as tolerated. She is taking vitamins as instructed. She did speak with the registered dietitian for continued follow up. Discussed with dietitian and I agree with recommendations and plan. Discussed with patient that I would recommend starting a stool softener which she says she has at home. Start taking daily and can increase to twice a day.  It had already been recommended by

## 2023-06-23 ENCOUNTER — PATIENT MESSAGE (OUTPATIENT)
Dept: BARIATRICS/WEIGHT MGMT | Age: 28
End: 2023-06-23

## 2023-06-24 NOTE — TELEPHONE ENCOUNTER
From: Juana Martinez  To: Ashlyn Hernandez  Sent: 6/23/2023 7:42 PM EDT  Subject: Satish Morejon Testing    Suleman Haskins, I hope all is well. Would it be possible to also request a gastric empty test to rule out gastroparesis?

## 2023-06-30 ENCOUNTER — HOSPITAL ENCOUNTER (OUTPATIENT)
Dept: GENERAL RADIOLOGY | Age: 28
Discharge: HOME | End: 2023-06-30
Payer: COMMERCIAL

## 2023-06-30 ENCOUNTER — PATIENT MESSAGE (OUTPATIENT)
Dept: BARIATRICS/WEIGHT MGMT | Age: 28
End: 2023-06-30

## 2023-06-30 DIAGNOSIS — Z98.84 S/P LAPAROSCOPIC SLEEVE GASTRECTOMY: ICD-10-CM

## 2023-06-30 DIAGNOSIS — R11.0 NAUSEA: ICD-10-CM

## 2023-06-30 PROCEDURE — 74240 X-RAY XM UPR GI TRC 1CNTRST: CPT

## 2023-07-05 ENCOUNTER — OFFICE VISIT (OUTPATIENT)
Dept: NEUROLOGY | Age: 28
End: 2023-07-05
Payer: COMMERCIAL

## 2023-07-05 VITALS
BODY MASS INDEX: 38.51 KG/M2 | HEIGHT: 69 IN | WEIGHT: 260 LBS | HEART RATE: 114 BPM | DIASTOLIC BLOOD PRESSURE: 85 MMHG | SYSTOLIC BLOOD PRESSURE: 141 MMHG

## 2023-07-05 DIAGNOSIS — R20.2 NUMBNESS AND TINGLING: Primary | ICD-10-CM

## 2023-07-05 DIAGNOSIS — R20.0 NUMBNESS AND TINGLING: Primary | ICD-10-CM

## 2023-07-05 DIAGNOSIS — F41.1 GENERALIZED ANXIETY DISORDER: ICD-10-CM

## 2023-07-05 PROCEDURE — 99213 OFFICE O/P EST LOW 20 MIN: CPT | Performed by: PSYCHIATRY & NEUROLOGY

## 2023-07-05 NOTE — PROGRESS NOTES
medication regimen and side effect from these medications. Assessment:   Diagnosis Orders   1. Numbness and tingling        2. Generalized anxiety disorder            Multiple psychosomatic complaints of unclear etiology so far. Multiple work-up and seen by different neurologist without clear organic cause. Less likely dysautonomia or POTS. If symptoms persist, can consider tilt table testing with her PCP  Continue SSRI  She is considering her third appointment with  neurology in November  Discussed biofeedback and risk of worsening anxiety  No more testing at this point  No further follow-up needed. ------------------------

## 2023-07-14 PROBLEM — R06.02 SOB (SHORTNESS OF BREATH): Status: ACTIVE | Noted: 2023-07-14

## 2023-07-24 ENCOUNTER — HOSPITAL ENCOUNTER (OUTPATIENT)
Dept: CT IMAGING | Age: 28
Discharge: HOME | End: 2023-07-24
Attending: INTERNAL MEDICINE
Payer: COMMERCIAL

## 2023-07-24 ENCOUNTER — OFFICE VISIT (OUTPATIENT)
Dept: CARDIOLOGY CLINIC | Age: 28
End: 2023-07-24
Payer: COMMERCIAL

## 2023-07-24 VITALS
HEART RATE: 100 BPM | BODY MASS INDEX: 38.66 KG/M2 | DIASTOLIC BLOOD PRESSURE: 80 MMHG | SYSTOLIC BLOOD PRESSURE: 120 MMHG | WEIGHT: 261.8 LBS

## 2023-07-24 DIAGNOSIS — R00.2 PALPITATIONS: Primary | ICD-10-CM

## 2023-07-24 DIAGNOSIS — R06.00 DYSPNEA, UNSPECIFIED TYPE: ICD-10-CM

## 2023-07-24 DIAGNOSIS — R10.32 ABDOMINAL PAIN, LLQ: ICD-10-CM

## 2023-07-24 DIAGNOSIS — R42 DIZZINESS: ICD-10-CM

## 2023-07-24 DIAGNOSIS — R07.9 CHEST PAIN, UNSPECIFIED TYPE: ICD-10-CM

## 2023-07-24 PROCEDURE — 2500000003 HC RX 250 WO HCPCS: Performed by: INTERNAL MEDICINE

## 2023-07-24 PROCEDURE — 93000 ELECTROCARDIOGRAM COMPLETE: CPT | Performed by: INTERNAL MEDICINE

## 2023-07-24 PROCEDURE — 99214 OFFICE O/P EST MOD 30 MIN: CPT | Performed by: INTERNAL MEDICINE

## 2023-07-24 PROCEDURE — 74176 CT ABD & PELVIS W/O CONTRAST: CPT

## 2023-07-24 RX ADMIN — BARIUM SULFATE 2 PACKET: 9.5 POWDER, FOR SUSPENSION ORAL at 11:47

## 2023-07-28 ENCOUNTER — HOSPITAL ENCOUNTER (OUTPATIENT)
Dept: NUCLEAR MEDICINE | Age: 28
Discharge: HOME | End: 2023-07-28
Attending: INTERNAL MEDICINE
Payer: COMMERCIAL

## 2023-07-28 DIAGNOSIS — R11.2 NAUSEA AND VOMITING, UNSPECIFIED VOMITING TYPE: ICD-10-CM

## 2023-07-28 PROCEDURE — 78264 GASTRIC EMPTYING IMG STUDY: CPT

## 2023-07-28 PROCEDURE — A9541 TC99M SULFUR COLLOID: HCPCS | Performed by: INTERNAL MEDICINE

## 2023-07-28 PROCEDURE — 3430000000 HC RX DIAGNOSTIC RADIOPHARMACEUTICAL: Performed by: INTERNAL MEDICINE

## 2023-07-28 RX ADMIN — TECHNETIUM TC 99M SULFUR COLLOID 0.79 MILLICURIE: KIT at 10:00

## 2023-07-30 ENCOUNTER — PATIENT MESSAGE (OUTPATIENT)
Dept: PRIMARY CARE CLINIC | Age: 28
End: 2023-07-30

## 2023-07-31 ENCOUNTER — HOSPITAL ENCOUNTER (OUTPATIENT)
Dept: NON INVASIVE DIAGNOSTICS | Age: 28
Discharge: HOME | End: 2023-07-31
Payer: COMMERCIAL

## 2023-07-31 DIAGNOSIS — N76.0 BACTERIAL VAGINOSIS: Primary | ICD-10-CM

## 2023-07-31 DIAGNOSIS — B96.89 BACTERIAL VAGINOSIS: Primary | ICD-10-CM

## 2023-07-31 DIAGNOSIS — R07.9 CHEST PAIN, UNSPECIFIED TYPE: ICD-10-CM

## 2023-07-31 LAB
LV EF: 50 %
LVEF MODALITY: NORMAL

## 2023-07-31 PROCEDURE — 93320 DOPPLER ECHO COMPLETE: CPT

## 2023-07-31 PROCEDURE — 93351 STRESS TTE COMPLETE: CPT

## 2023-07-31 RX ORDER — METRONIDAZOLE 7.5 MG/G
GEL VAGINAL
Qty: 3 EACH | Refills: 0 | Status: SHIPPED | OUTPATIENT
Start: 2023-07-31 | End: 2023-08-07

## 2023-07-31 NOTE — TELEPHONE ENCOUNTER
Dr Darci Calvillo:      I would have just sent you a Rx request, but I don't show \"Metronidazole gel\"  in pt's med list, or med Hx list.     Under the reconciliation button, it lists Metronidazole, but  only in pill form, not a gel.

## 2023-08-11 ENCOUNTER — PATIENT MESSAGE (OUTPATIENT)
Dept: PRIMARY CARE CLINIC | Age: 28
End: 2023-08-11

## 2023-08-11 RX ORDER — FLUCONAZOLE 150 MG/1
150 TABLET ORAL
Qty: 2 TABLET | Refills: 0 | Status: SHIPPED | OUTPATIENT
Start: 2023-08-11 | End: 2023-08-17

## 2023-08-25 ENCOUNTER — PATIENT MESSAGE (OUTPATIENT)
Dept: PRIMARY CARE CLINIC | Age: 28
End: 2023-08-25

## 2023-08-25 RX ORDER — CETIRIZINE HYDROCHLORIDE 10 MG/1
10 TABLET ORAL DAILY
Qty: 30 TABLET | Refills: 0 | Status: SHIPPED | OUTPATIENT
Start: 2023-08-25 | End: 2023-09-24

## 2023-08-25 RX ORDER — FLUTICASONE PROPIONATE 50 MCG
1 SPRAY, SUSPENSION (ML) NASAL DAILY
Qty: 18 G | Refills: 2 | Status: SHIPPED | OUTPATIENT
Start: 2023-08-25

## 2023-08-29 ENCOUNTER — OFFICE VISIT (OUTPATIENT)
Dept: BARIATRICS/WEIGHT MGMT | Age: 28
End: 2023-08-29
Payer: COMMERCIAL

## 2023-08-29 VITALS
HEART RATE: 100 BPM | OXYGEN SATURATION: 98 % | SYSTOLIC BLOOD PRESSURE: 116 MMHG | DIASTOLIC BLOOD PRESSURE: 68 MMHG | WEIGHT: 266 LBS | BODY MASS INDEX: 40.32 KG/M2 | RESPIRATION RATE: 18 BRPM | HEIGHT: 68 IN

## 2023-08-29 DIAGNOSIS — G47.33 OBSTRUCTIVE SLEEP APNEA: ICD-10-CM

## 2023-08-29 DIAGNOSIS — R73.03 PREDIABETES: ICD-10-CM

## 2023-08-29 DIAGNOSIS — K21.9 CHRONIC GERD: ICD-10-CM

## 2023-08-29 DIAGNOSIS — E66.01 MORBID OBESITY WITH BMI OF 40.0-44.9, ADULT (HCC): Primary | ICD-10-CM

## 2023-08-29 DIAGNOSIS — Z98.84 S/P LAPAROSCOPIC SLEEVE GASTRECTOMY: ICD-10-CM

## 2023-08-29 PROCEDURE — 1036F TOBACCO NON-USER: CPT | Performed by: SURGERY

## 2023-08-29 PROCEDURE — G8427 DOCREV CUR MEDS BY ELIG CLIN: HCPCS | Performed by: SURGERY

## 2023-08-29 PROCEDURE — G8417 CALC BMI ABV UP PARAM F/U: HCPCS | Performed by: SURGERY

## 2023-08-29 PROCEDURE — 99214 OFFICE O/P EST MOD 30 MIN: CPT | Performed by: SURGERY

## 2023-08-29 NOTE — PROGRESS NOTES
Dietary Assessment Note      Vitals:   Vitals:    08/29/23 1132   BP: 116/68   Pulse: 100   Resp: 18   SpO2: 98%   Weight: 266 lb (120.7 kg)   Height: 5' 8\" (1.727 m)    Patient gained 9 lbs over 2 months. Pt reports lowest weight 239 lbs January 2023. Then reports twitching/tremors, night sweats, wasn't following food or drinking guidelines. Now reports increased dose of anxiety medication causing increased hunger. Total Weight Loss: 35 lbs    Labs reviewed: no lab studies available for review at time of visit    Protein intake: Patient not tracking     Fluid intake: 4 bottles water/day, continues to avoid soda and juices    Multivitamin/mineral intake: inconsistent      Calcium intake: none    Other: none    Exercise: no    Nutrition Assessment: 1 year 3 months post-op visit. UGI 6/30/23, no significant findings. Gastric emptying 7/28/23 WNL, no reflux or hernia observed. Wake up 8-9 AM  Breakfast: None  Snack: None  Lunch: 12-1 PM: Fast food OR Leftovers  Snack: Grazing - chips OR nuts OR 2-3 snack packs  Dinner: 7 PM: Baked chix + green beans w/ sf bbq, olive oil to cook OR chix wings  Snack: Continues to snack until 10 PM  Bed around 12-3 AM, feeling tired, started snoring again     Amount able to eat per sitting: varies - still gets full     Following 30/30/30 rule: Eating over 20-30 minutes.      Food Intolerances/issues: none    Client Concerns: always feeling hungry   Handouts: 1200 kcal LCMP, FM, bars & shakes    Goals:   - MWL - start tracking with 1200 kcal LCMP  - Take 1 Fusion capsule with Iron/day and 2 Fusion calcium soft chews/day  - Buy pre-portioned items and include fiber with snacks  - Follow up with Sleep Medicine     Plan: Follow up at 1 year 6 months post op and as needed    Mary Miller, MISSY, LD

## 2023-08-29 NOTE — PATIENT INSTRUCTIONS
Patient received dietary handouts and education.     Goals:   - MWL - start tracking with 1200 kcal LCMP  - Take 1 Fusion capsule with Iron/day and 2 Fusion calcium soft chews/day  - Buy pre-portioned items and include fiber with snacks  - Follow up with Sleep Medicine

## 2023-09-04 ENCOUNTER — PATIENT MESSAGE (OUTPATIENT)
Dept: PRIMARY CARE CLINIC | Age: 28
End: 2023-09-04

## 2023-09-05 RX ORDER — TOBRAMYCIN 3 MG/ML
SOLUTION/ DROPS OPHTHALMIC
Qty: 5 ML | Refills: 0 | Status: SHIPPED | OUTPATIENT
Start: 2023-09-05

## 2023-09-16 DIAGNOSIS — J45.20 MILD INTERMITTENT ASTHMA WITHOUT COMPLICATION: ICD-10-CM

## 2023-09-18 RX ORDER — ALBUTEROL SULFATE 90 UG/1
AEROSOL, METERED RESPIRATORY (INHALATION)
Qty: 18 EACH | Refills: 3 | Status: SHIPPED | OUTPATIENT
Start: 2023-09-18

## 2023-09-21 RX ORDER — CETIRIZINE HYDROCHLORIDE 10 MG/1
10 TABLET ORAL DAILY
Qty: 30 TABLET | Refills: 2 | Status: SHIPPED | OUTPATIENT
Start: 2023-09-21

## 2023-09-29 ENCOUNTER — PATIENT MESSAGE (OUTPATIENT)
Dept: PRIMARY CARE CLINIC | Age: 28
End: 2023-09-29

## 2023-09-29 ENCOUNTER — OFFICE VISIT (OUTPATIENT)
Dept: PRIMARY CARE CLINIC | Age: 28
End: 2023-09-29

## 2023-09-29 VITALS
WEIGHT: 268 LBS | RESPIRATION RATE: 16 BRPM | DIASTOLIC BLOOD PRESSURE: 81 MMHG | HEART RATE: 87 BPM | BODY MASS INDEX: 40.75 KG/M2 | SYSTOLIC BLOOD PRESSURE: 121 MMHG

## 2023-09-29 DIAGNOSIS — E66.01 MORBID OBESITY WITH BMI OF 40.0-44.9, ADULT (HCC): ICD-10-CM

## 2023-09-29 DIAGNOSIS — Z98.84 S/P LAPAROSCOPIC SLEEVE GASTRECTOMY: ICD-10-CM

## 2023-09-29 DIAGNOSIS — N39.0 ACUTE UTI: ICD-10-CM

## 2023-09-29 DIAGNOSIS — R73.03 PREDIABETES: ICD-10-CM

## 2023-09-29 DIAGNOSIS — R35.0 URINARY FREQUENCY: ICD-10-CM

## 2023-09-29 LAB
25(OH)D3 SERPL-MCNC: 33.2 NG/ML
ALBUMIN SERPL-MCNC: 4.2 G/DL (ref 3.4–5)
ALBUMIN/GLOB SERPL: 1.4 {RATIO} (ref 1.1–2.2)
ALP SERPL-CCNC: 65 U/L (ref 40–129)
ALT SERPL-CCNC: 11 U/L (ref 10–40)
ANION GAP SERPL CALCULATED.3IONS-SCNC: 13 MMOL/L (ref 3–16)
AST SERPL-CCNC: 15 U/L (ref 15–37)
BILIRUB SERPL-MCNC: 0.6 MG/DL (ref 0–1)
BILIRUBIN, POC: NEGATIVE
BLOOD URINE, POC: NEGATIVE
BUN SERPL-MCNC: 8 MG/DL (ref 7–20)
CALCIUM SERPL-MCNC: 8.9 MG/DL (ref 8.3–10.6)
CHLORIDE SERPL-SCNC: 105 MMOL/L (ref 99–110)
CHOLEST SERPL-MCNC: 180 MG/DL (ref 0–199)
CLARITY, POC: NORMAL
CO2 SERPL-SCNC: 22 MMOL/L (ref 21–32)
COLOR, POC: NORMAL
CREAT SERPL-MCNC: 0.5 MG/DL (ref 0.6–1.1)
FOLATE SERPL-MCNC: 13.54 NG/ML (ref 4.78–24.2)
GFR SERPLBLD CREATININE-BSD FMLA CKD-EPI: >60 ML/MIN/{1.73_M2}
GLUCOSE SERPL-MCNC: 93 MG/DL (ref 70–99)
GLUCOSE URINE, POC: NEGATIVE
HDLC SERPL-MCNC: 57 MG/DL (ref 40–60)
INR PPP: 1.06 (ref 0.84–1.16)
IRON SATN MFR SERPL: 6 % (ref 15–50)
IRON SERPL-MCNC: 23 UG/DL (ref 37–145)
KETONES, POC: NEGATIVE
LDLC SERPL CALC-MCNC: 106 MG/DL
LEUKOCYTE EST, POC: NORMAL
NITRITE, POC: NEGATIVE
PH, POC: 6
POTASSIUM SERPL-SCNC: 4.1 MMOL/L (ref 3.5–5.1)
PROT SERPL-MCNC: 7.1 G/DL (ref 6.4–8.2)
PROTEIN, POC: NORMAL
PROTHROMBIN TIME: 13.8 SEC (ref 11.5–14.8)
SODIUM SERPL-SCNC: 140 MMOL/L (ref 136–145)
SPECIFIC GRAVITY, POC: 1.01
TIBC SERPL-MCNC: 393 UG/DL (ref 260–445)
TRIGL SERPL-MCNC: 86 MG/DL (ref 0–150)
TSH SERPL DL<=0.005 MIU/L-ACNC: 1.19 UIU/ML (ref 0.27–4.2)
UROBILINOGEN, POC: 1
VIT B12 SERPL-MCNC: 819 PG/ML (ref 211–911)
VLDLC SERPL CALC-MCNC: 17 MG/DL

## 2023-09-29 RX ORDER — SULFAMETHOXAZOLE AND TRIMETHOPRIM 800; 160 MG/1; MG/1
1 TABLET ORAL 2 TIMES DAILY
Qty: 20 TABLET | Refills: 0 | Status: SHIPPED | OUTPATIENT
Start: 2023-09-29 | End: 2023-10-09

## 2023-09-29 ASSESSMENT — ENCOUNTER SYMPTOMS
BLOOD IN STOOL: 0
BACK PAIN: 1
CONSTIPATION: 0
SHORTNESS OF BREATH: 0
DIARRHEA: 0
ABDOMINAL PAIN: 0

## 2023-09-29 NOTE — PROGRESS NOTES
2023     Ale Ward (:  1995) is a 33 y.o. female, here for evaluation of the following medical concerns:    Urinary Frequency   Associated symptoms include frequency. Pertinent negatives include no hematuria or urgency. Back Pain  Pertinent negatives include no abdominal pain or chest pain. Patient presented to the office for checkup. She is obese status post sleeve gastrectomy who has lost weight for short short while then started to gain weight again. She has prediabetes but no overt sign of diabetes. She presented to the office today complaining of vague low back pain and urinary frequency. She is concerned about UTI. She denies fever and chills denies bowel disturbance. Review of Systems   Constitutional:  Negative for activity change and appetite change. Eyes:  Negative for visual disturbance. Respiratory:  Negative for shortness of breath. Cardiovascular:  Negative for chest pain and leg swelling. Gastrointestinal:  Negative for abdominal pain, blood in stool, constipation and diarrhea. Genitourinary:  Positive for frequency. Negative for difficulty urinating, hematuria, menstrual problem and urgency. Musculoskeletal:  Positive for back pain. Neurological:  Negative for dizziness and syncope. Psychiatric/Behavioral:  Negative for behavioral problems. Prior to Visit Medications    Medication Sig Taking? Authorizing Provider   sulfamethoxazole-trimethoprim (BACTRIM DS) 800-160 MG per tablet Take 1 tablet by mouth 2 times daily for 10 days Yes Surjit Kaur MD   cetirizine (ZYRTEC) 10 MG tablet Take 1 tablet by mouth daily  Surjit Kaur MD   albuterol sulfate HFA (PROVENTIL;VENTOLIN;PROAIR) 108 (90 Base) MCG/ACT inhaler TAKE 2 PUFFS BY MOUTH EVERY 6 HOURS AS NEEDED FOR WHEEZE  Surjit Kaur MD   tobramycin (TOBREX) 0.3 % ophthalmic solution 1 drop to the affected eye four times daily.   Surjit Kaur MD   fluticasone (FLONASE) 50 MCG/ACT

## 2023-09-30 LAB
EST. AVERAGE GLUCOSE BLD GHB EST-MCNC: 116.9 MG/DL
HBA1C MFR BLD: 5.7 %

## 2023-10-06 ENCOUNTER — PATIENT MESSAGE (OUTPATIENT)
Dept: BARIATRICS/WEIGHT MGMT | Age: 28
End: 2023-10-06

## 2023-10-06 LAB
A-TOCOPHEROL VIT E SERPL-MCNC: 10.7 MG/L (ref 5.5–18)
ANNOTATION COMMENT IMP: NORMAL
BETA+GAMMA TOCOPHEROL SERPL-MCNC: 1.4 MG/L (ref 0–6)
RETINYL PALMITATE SERPL-MCNC: <0.02 MG/L (ref 0–0.1)
VIT A SERPL-MCNC: 0.38 MG/L (ref 0.3–1.2)
VIT B1 BLD-MCNC: 96 NMOL/L (ref 70–180)

## 2023-10-06 NOTE — TELEPHONE ENCOUNTER
From: Esther Stein  Sent: 10/5/2023 11:52 PM EDT  To: [facility] Clinical Staff  Subject: Creatinine Level    I also have on and off chills as well.

## 2023-10-10 NOTE — TELEPHONE ENCOUNTER
From: Enmanuel Haley  To: Fang Frank  Sent: 10/6/2023 6:46 PM EDT  Subject: Ida Man,  Dr. Armando Soria forwarded me your lab results. Your Vitamin A (normal), Vitamin B1 (normal), Vitamin B12 (normal), Vitamin D (normal), Vitamin E (normal), Folate (normal), CMP (Kidney and liver function look good), Lipid Panel (Cholesterol, HDL and triglycerides normal. Just a slight elevation in your LDL at 106 with normal being < 100.), Iron studies (Dr. Armando Soria recommended that you follow up with your PCP for your iron deficiency), TSH (Thyroid function normal),PT/INR (normal) and HgbA1c (Is just in the prediabetes range at 5.7. Prediabetes is 5.7-6.4.).    Thank you,  ALETHA Anthony

## 2023-12-11 ENCOUNTER — PATIENT MESSAGE (OUTPATIENT)
Dept: CARDIOLOGY CLINIC | Age: 28
End: 2023-12-11

## 2023-12-11 DIAGNOSIS — R00.2 PALPITATIONS: Primary | ICD-10-CM

## 2023-12-13 ENCOUNTER — NURSE ONLY (OUTPATIENT)
Dept: CARDIOLOGY CLINIC | Age: 28
End: 2023-12-13

## 2023-12-13 ENCOUNTER — TELEPHONE (OUTPATIENT)
Dept: CARDIOLOGY CLINIC | Age: 28
End: 2023-12-13

## 2023-12-22 ENCOUNTER — PATIENT MESSAGE (OUTPATIENT)
Dept: PRIMARY CARE CLINIC | Age: 28
End: 2023-12-22

## 2023-12-27 DIAGNOSIS — J45.20 MILD INTERMITTENT ASTHMA WITHOUT COMPLICATION: ICD-10-CM

## 2023-12-27 RX ORDER — GUAIFENESIN 600 MG/1
1200 TABLET, EXTENDED RELEASE ORAL 2 TIMES DAILY
Qty: 60 TABLET | Refills: 0 | Status: SHIPPED | OUTPATIENT
Start: 2023-12-27 | End: 2024-01-11

## 2023-12-27 RX ORDER — ALBUTEROL SULFATE 90 UG/1
2 AEROSOL, METERED RESPIRATORY (INHALATION) EVERY 6 HOURS PRN
Qty: 18 EACH | Refills: 3 | Status: SHIPPED | OUTPATIENT
Start: 2023-12-27

## 2023-12-28 DIAGNOSIS — R00.2 PALPITATION: Primary | ICD-10-CM

## 2023-12-28 DIAGNOSIS — R00.2 PALPITATIONS: ICD-10-CM

## 2024-01-03 ENCOUNTER — TELEPHONE (OUTPATIENT)
Dept: CARDIOLOGY CLINIC | Age: 29
End: 2024-01-03

## 2024-01-03 DIAGNOSIS — R00.2 PALPITATIONS: Primary | ICD-10-CM

## 2024-01-03 NOTE — TELEPHONE ENCOUNTER
----- Message from Viji Meade MD sent at 1/3/2024 12:37 PM EST -----  Low PVC burden on monitor  PVCs due correlate with symptoms  F/u after testing  Add cmp/mag prior to follow up

## 2024-01-03 NOTE — TELEPHONE ENCOUNTER
Spoke with pt regarding results. She verbalized understanding and will get labs drawn prior to apt.

## 2024-01-04 ENCOUNTER — PATIENT MESSAGE (OUTPATIENT)
Dept: PRIMARY CARE CLINIC | Age: 29
End: 2024-01-04

## 2024-01-05 NOTE — TELEPHONE ENCOUNTER
From: Leonela Freeman  To: Dr. Andrade Rodrigues  Sent: 1/4/2024 3:46 PM EST  Subject: Low Hemoglobin    Hi Dr. Rodrigues I’m not sure if you have record of this but I recently had fibroid ablation surgery through [facility] Dr. Mickey Hooker on December 19th to try and control my heavy mental cycles. I was looking at the paperwork from discharge and my hemoglobin is 6 or 7. He gave me accufer two months ago but it’s 400 dollars per prescription and I can’t afford that. He recently retired at the beginning of the year. In the midst of that I was having heart palpitations and followed up with my old cardiologist and she said everything looked fine but she wants to do more blood work to ensure it’s nothing vitamin related, the nurse pulled up my old blood work from my surgery date on the 19th and was shocked at how low my hemoglobin was. She stated that normally patient get transfused at 8 or 7 …I’ve been running these numbers for a while and I’m concerned at why I’ve never been offered this. It’s probably why I get dizzy when standing have the heart palpitations and extremely exhausted.   I want to make sure you have access and are being notified of all of this because it’s quite alarming that I wasn’t offered a transfusion. What are your thoughts on moving forward with my plan of care. Any insight is appreciated!

## 2024-01-08 ENCOUNTER — OFFICE VISIT (OUTPATIENT)
Dept: PRIMARY CARE CLINIC | Age: 29
End: 2024-01-08
Payer: COMMERCIAL

## 2024-01-08 VITALS
HEART RATE: 87 BPM | BODY MASS INDEX: 42.27 KG/M2 | SYSTOLIC BLOOD PRESSURE: 128 MMHG | DIASTOLIC BLOOD PRESSURE: 75 MMHG | WEIGHT: 278 LBS | OXYGEN SATURATION: 97 % | RESPIRATION RATE: 18 BRPM

## 2024-01-08 DIAGNOSIS — E66.01 MORBID OBESITY WITH BMI OF 40.0-44.9, ADULT (HCC): ICD-10-CM

## 2024-01-08 DIAGNOSIS — D50.0 IRON DEFICIENCY ANEMIA DUE TO CHRONIC BLOOD LOSS: Primary | ICD-10-CM

## 2024-01-08 DIAGNOSIS — G47.33 OBSTRUCTIVE SLEEP APNEA: ICD-10-CM

## 2024-01-08 DIAGNOSIS — N93.9 ABNORMAL UTERINE BLEEDING: ICD-10-CM

## 2024-01-08 DIAGNOSIS — D25.9 UTERINE LEIOMYOMA, UNSPECIFIED LOCATION: ICD-10-CM

## 2024-01-08 DIAGNOSIS — R00.2 PALPITATIONS: ICD-10-CM

## 2024-01-08 DIAGNOSIS — Z98.84 S/P LAPAROSCOPIC SLEEVE GASTRECTOMY: ICD-10-CM

## 2024-01-08 DIAGNOSIS — F41.1 GENERALIZED ANXIETY DISORDER: ICD-10-CM

## 2024-01-08 LAB
ALBUMIN SERPL-MCNC: 4.1 G/DL (ref 3.4–5)
ALBUMIN/GLOB SERPL: 1.5 {RATIO} (ref 1.1–2.2)
ALP SERPL-CCNC: 73 U/L (ref 40–129)
ALT SERPL-CCNC: 14 U/L (ref 10–40)
ANION GAP SERPL CALCULATED.3IONS-SCNC: 10 MMOL/L (ref 3–16)
AST SERPL-CCNC: 15 U/L (ref 15–37)
BASOPHILS # BLD: 0.1 K/UL (ref 0–0.2)
BASOPHILS NFR BLD: 0.8 %
BILIRUB SERPL-MCNC: 0.6 MG/DL (ref 0–1)
BUN SERPL-MCNC: 10 MG/DL (ref 7–20)
CALCIUM SERPL-MCNC: 8.9 MG/DL (ref 8.3–10.6)
CHLORIDE SERPL-SCNC: 107 MMOL/L (ref 99–110)
CO2 SERPL-SCNC: 26 MMOL/L (ref 21–32)
CREAT SERPL-MCNC: 0.6 MG/DL (ref 0.6–1.1)
DEPRECATED RDW RBC AUTO: 17.8 % (ref 12.4–15.4)
EOSINOPHIL # BLD: 0.1 K/UL (ref 0–0.6)
EOSINOPHIL NFR BLD: 1.1 %
GFR SERPLBLD CREATININE-BSD FMLA CKD-EPI: >60 ML/MIN/{1.73_M2}
GLUCOSE SERPL-MCNC: 93 MG/DL (ref 70–99)
HCT VFR BLD AUTO: 26.7 % (ref 36–48)
HGB BLD-MCNC: 7.9 G/DL (ref 12–16)
LYMPHOCYTES # BLD: 1.6 K/UL (ref 1–5.1)
LYMPHOCYTES NFR BLD: 26.1 %
MAGNESIUM SERPL-MCNC: 1.8 MG/DL (ref 1.8–2.4)
MCH RBC QN AUTO: 18.3 PG (ref 26–34)
MCHC RBC AUTO-ENTMCNC: 29.6 G/DL (ref 31–36)
MCV RBC AUTO: 61.9 FL (ref 80–100)
MONOCYTES # BLD: 0.5 K/UL (ref 0–1.3)
MONOCYTES NFR BLD: 8.1 %
NEUTROPHILS # BLD: 3.9 K/UL (ref 1.7–7.7)
NEUTROPHILS NFR BLD: 63.9 %
PATH INTERP BLD-IMP: NO
PLATELET # BLD AUTO: 361 K/UL (ref 135–450)
PMV BLD AUTO: 9.1 FL (ref 5–10.5)
POTASSIUM SERPL-SCNC: 4.6 MMOL/L (ref 3.5–5.1)
PROT SERPL-MCNC: 6.9 G/DL (ref 6.4–8.2)
RBC # BLD AUTO: 4.32 M/UL (ref 4–5.2)
SODIUM SERPL-SCNC: 143 MMOL/L (ref 136–145)
WBC # BLD AUTO: 6.1 K/UL (ref 4–11)

## 2024-01-08 PROCEDURE — 99214 OFFICE O/P EST MOD 30 MIN: CPT | Performed by: FAMILY MEDICINE

## 2024-01-08 RX ORDER — FERRIC MALTOL 30 MG/1
CAPSULE ORAL
COMMUNITY

## 2024-01-08 ASSESSMENT — PATIENT HEALTH QUESTIONNAIRE - PHQ9
SUM OF ALL RESPONSES TO PHQ QUESTIONS 1-9: 0
SUM OF ALL RESPONSES TO PHQ QUESTIONS 1-9: 0
2. FEELING DOWN, DEPRESSED OR HOPELESS: 0
1. LITTLE INTEREST OR PLEASURE IN DOING THINGS: 0
SUM OF ALL RESPONSES TO PHQ9 QUESTIONS 1 & 2: 0
SUM OF ALL RESPONSES TO PHQ QUESTIONS 1-9: 0
SUM OF ALL RESPONSES TO PHQ QUESTIONS 1-9: 0

## 2024-01-08 ASSESSMENT — ENCOUNTER SYMPTOMS
CONSTIPATION: 0
SHORTNESS OF BREATH: 0
ABDOMINAL PAIN: 0
DIARRHEA: 0
BLOOD IN STOOL: 0

## 2024-01-08 NOTE — PROGRESS NOTES
2024     Leonela Freeman (:  1995) is a 28 y.o. female, here for evaluation of the following medical concerns:    HPI  Patient is a 28 years old female with morbid obesity status post laparoscopic gastrectomy in , has anxiety, abnormal uterine bleeding, iron deficiency anemia due to chronic blood loss, leiomyoma of the uterus status post ablation 2023.  Hemoglobin dropped to 7.9 g  post ablation but does not require blood transfusion but patient was given iron supplement.  Unfortunately his gynecologist who did the ablation recently retired so patient has to see a new gynecologist. (Has appointment in a couple of days)  She also saw her cardiologist due to palpitation and was told it might be due to PAC and PVC which are benign arrhythmia that does not require treatment at any rate serum magnesium ,CMP and repeat CBC was ordered.  Patient presented to the office just to be sure she\" will not develop cancer.\"  She is still anxious and takes Effexor 75 mg 2 capsule daily.  She is reported that today is the second day of her \"normal  heavy menstrual period\" she denies nausea vomiting or dizziness denies abdominal pain.    Review of Systems   Constitutional:  Negative for activity change and appetite change.   Eyes:  Negative for visual disturbance.   Respiratory:  Negative for shortness of breath.    Cardiovascular:  Negative for chest pain and leg swelling.   Gastrointestinal:  Negative for abdominal pain, blood in stool, constipation and diarrhea.   Genitourinary:  Negative for difficulty urinating, frequency, hematuria, menstrual problem and urgency.   Neurological:  Negative for dizziness and syncope.   Psychiatric/Behavioral:  Negative for behavioral problems.        Prior to Visit Medications    Medication Sig Taking? Authorizing Provider   Ferric Maltol (ACCRUFER) 30 MG CAPS Take by mouth Yes Provider, MD Kim   cetirizine (ZYRTEC) 10 MG tablet Take 1 tablet by mouth

## 2024-01-09 ENCOUNTER — OFFICE VISIT (OUTPATIENT)
Dept: CARDIOLOGY CLINIC | Age: 29
End: 2024-01-09
Payer: COMMERCIAL

## 2024-01-09 VITALS
WEIGHT: 277 LBS | SYSTOLIC BLOOD PRESSURE: 124 MMHG | HEART RATE: 94 BPM | DIASTOLIC BLOOD PRESSURE: 76 MMHG | OXYGEN SATURATION: 98 % | BODY MASS INDEX: 42.12 KG/M2

## 2024-01-09 DIAGNOSIS — R00.2 PALPITATIONS: Primary | ICD-10-CM

## 2024-01-09 DIAGNOSIS — D50.0 IRON DEFICIENCY ANEMIA DUE TO CHRONIC BLOOD LOSS: ICD-10-CM

## 2024-01-09 DIAGNOSIS — I49.3 PVC (PREMATURE VENTRICULAR CONTRACTION): ICD-10-CM

## 2024-01-09 PROCEDURE — 99213 OFFICE O/P EST LOW 20 MIN: CPT | Performed by: INTERNAL MEDICINE

## 2024-01-09 NOTE — PROGRESS NOTES
[facility]     Outpatient Cardiology         Patient Name:  Leonela Freeman  Requesting Physician: No admitting provider for patient encounter.  Primary Care Physician: Andrade Rodrigues MD    Reason for Consultation/Chief Complaint:   Chief Complaint   Patient presents with    Follow-up    Palpitations       HPI:     Leonela Freeman is a 28 y.o. female with a history of anxiety, ALPHONSO (CPAP), DM, and palpitations (PVC's). She presents for follow up.     Today she reports that she underwent fibroid ablation due to heavy cycles 12/19/23. Her labs from 1/8/2024 showed she continues to be anemic. She has followed up with her PCP regarding this. She feels her symptoms/ palpitations are related to her anemia. Patient currently denies any weight gain, edema, chest pain, shortness of breath, dizziness, and syncope.      (gastric sleeve) surgery 5/2022    Cam monitor 12/13-12/17/23 showed NSR. 181 symptoms episodes associated with PVCs burden <0.5%. average heart rate 87 bpm, range of .     30 day monitor from 2/2022 showed symptoms related to PACs/PVCs with <1% burden.       Histories:     Past Medical History:   has a past medical history of Allergic rhinitis, Anxiety, Asthma, Chronic GERD, Chronic GERD, Diabetes mellitus (HCC), Hidradenitis, History of prediabetes, Iron deficiency anemia, Menorrhagia, Migraine, Obesity, Obstructive sleep apnea, Pre-operative clearance, and Prediabetes.    Surgical History:   has a past surgical history that includes Tonsillectomy (1998); Cholecystectomy; Upper gastrointestinal endoscopy (N/A, 5/6/2022); and Sleeve Gastrectomy (N/A, 5/23/2022).     Social History:   reports that she has never smoked. She has never used smokeless tobacco. She reports that she does not currently use alcohol after a past usage of about 1.0 standard drink of alcohol per week. She reports that she does not use drugs.     Family History:  No evidence for sudden cardiac death or

## 2024-01-09 NOTE — PATIENT INSTRUCTIONS
Reviewed monitor results   Symptoms likely related to anemia   Follow up with PCP   Follow up with me in 1 year

## 2024-01-10 ENCOUNTER — TELEPHONE (OUTPATIENT)
Dept: SURGERY | Age: 29
End: 2024-01-10

## 2024-01-10 NOTE — TELEPHONE ENCOUNTER
Patient called complaining of a lump on her right breast that she felt 3 days ago. Patient has a history of Hydradenitis (HS). She was last seen in office on 09/28/21 with Steffany. Per Steffany's last note, patient was supposed to follow up in 4 weeks. Patient is willing to travel to any of the locations.     Please advise.     Patient can be reached @[phone].

## 2024-01-11 NOTE — TELEPHONE ENCOUNTER
Spoke to patient she has been treated for HS but this area is new and not in the same area that she has flair ups of HS. Right breast lump is new and does not feel like her HS area. No redness, skin changes, no nipple discharge, no tenderness noted, just a lump.    Patient was scheduled for Wednesday 1/31/24 at 10:30 in the [facility] office and placed on the wait list. Willing to travel to any location.    Patient was educated to call the office with any changes or worsening symptoms. Also educated to try warm compresses sand a good supportive bra if HS symptoms start.

## 2024-01-31 ENCOUNTER — OFFICE VISIT (OUTPATIENT)
Dept: SURGERY | Age: 29
End: 2024-01-31
Payer: COMMERCIAL

## 2024-01-31 VITALS
HEART RATE: 78 BPM | WEIGHT: 279.2 LBS | OXYGEN SATURATION: 98 % | RESPIRATION RATE: 18 BRPM | HEIGHT: 68 IN | BODY MASS INDEX: 42.31 KG/M2

## 2024-01-31 DIAGNOSIS — L73.2 HIDRADENITIS: ICD-10-CM

## 2024-01-31 DIAGNOSIS — N60.11 FIBROCYSTIC DISEASE OF RIGHT BREAST: ICD-10-CM

## 2024-01-31 DIAGNOSIS — L98.9 SKIN LESION: Primary | ICD-10-CM

## 2024-01-31 PROCEDURE — 99213 OFFICE O/P EST LOW 20 MIN: CPT | Performed by: NURSE PRACTITIONER

## 2024-01-31 NOTE — PROGRESS NOTES
[facility]   Surgical Breast Oncology     CC: Right breast lump    HPI: Ms. Leonela Freeman is a 28 y.o. woman who presents today for superficial right breast lump, noticed a week or two ago, getting smaller.  Non-tender.  No warmth or redness.  No drainage.  Thinks it's improving but wants to make sure it's nothing concerning, reports it's different than other HS symptoms.  Denies fevers/chills.  She denies any other abnormalities such as masses, color changes, nipple discharge, or changes to the nipple-areolar complex.     She has had breast infections in the past, and also a history of hidradenitis suppurative and has had nodules in her axilla and groin folds. HS has been well controlled over the last few years.   She has never had breast imaging or required a breast biopsy.   She has no family history of breast or ovarian cancer.      Review of Systems    Past Medical History:   Diagnosis Date   • Allergic rhinitis    • Anxiety    • Asthma    • Chronic GERD 11/30/2021   • Chronic GERD 11/30/2021   • Diabetes mellitus (HCC)    • Hidradenitis    • History of prediabetes 2012   • Iron deficiency anemia    • Menorrhagia    • Migraine    • Obesity    • Obstructive sleep apnea     cpap   • Pre-operative clearance 10/27/2020   • Prediabetes 2/17/2020       Past Surgical History:   Procedure Laterality Date   • CHOLECYSTECTOMY     • SLEEVE GASTRECTOMY N/A 5/23/2022    LAPAROSCOPIC SLEEVE GASTRECTOMY performed by Tigist Joaquin MD at [facility] [place]   • TONSILLECTOMY  1998   • UPPER GASTROINTESTINAL ENDOSCOPY N/A 5/6/2022    EGD BIOPSY performed by Tigist Joaquin MD at [facility] [place] [facility] ENDOSCOPY       Allergies as of 01/31/2024 - Fully Reviewed 01/31/2024   Allergen Reaction Noted   • Iodinated contrast media Itching 02/11/2023   • Seasonal  10/24/2018       Social History     Tobacco Use   • Smoking status: Never   • Smokeless tobacco: Never   Vaping Use   • Vaping Use: Never used   Substance Use Topics   • Alcohol use: Not

## 2024-03-22 ENCOUNTER — OFFICE VISIT (OUTPATIENT)
Dept: SLEEP MEDICINE | Age: 29
End: 2024-03-22
Payer: COMMERCIAL

## 2024-03-22 ENCOUNTER — PATIENT MESSAGE (OUTPATIENT)
Dept: PRIMARY CARE CLINIC | Age: 29
End: 2024-03-22

## 2024-03-22 VITALS
WEIGHT: 282 LBS | SYSTOLIC BLOOD PRESSURE: 125 MMHG | OXYGEN SATURATION: 99 % | HEIGHT: 69 IN | HEART RATE: 100 BPM | DIASTOLIC BLOOD PRESSURE: 86 MMHG | RESPIRATION RATE: 18 BRPM | BODY MASS INDEX: 41.77 KG/M2

## 2024-03-22 DIAGNOSIS — Z99.89 DEPENDENCE ON OTHER ENABLING MACHINES AND DEVICES: ICD-10-CM

## 2024-03-22 DIAGNOSIS — G47.33 OSA ON CPAP: Primary | ICD-10-CM

## 2024-03-22 DIAGNOSIS — E66.01 MORBID OBESITY WITH BMI OF 40.0-44.9, ADULT (HCC): ICD-10-CM

## 2024-03-22 PROCEDURE — 99214 OFFICE O/P EST MOD 30 MIN: CPT | Performed by: PSYCHIATRY & NEUROLOGY

## 2024-03-22 RX ORDER — METRONIDAZOLE 500 MG/1
500 TABLET ORAL 2 TIMES DAILY
Qty: 14 TABLET | Refills: 0 | Status: SHIPPED | OUTPATIENT
Start: 2024-03-22 | End: 2024-03-29

## 2024-03-22 ASSESSMENT — SLEEP AND FATIGUE QUESTIONNAIRES
HOW LIKELY ARE YOU TO NOD OFF OR FALL ASLEEP WHILE WATCHING TV: SLIGHT CHANCE OF DOZING
HOW LIKELY ARE YOU TO NOD OFF OR FALL ASLEEP WHILE SITTING AND READING: SLIGHT CHANCE OF DOZING
HOW LIKELY ARE YOU TO NOD OFF OR FALL ASLEEP IN A CAR, WHILE STOPPED FOR A FEW MINUTES IN TRAFFIC: WOULD NEVER DOZE
HOW LIKELY ARE YOU TO NOD OFF OR FALL ASLEEP WHILE SITTING QUIETLY AFTER LUNCH WITHOUT ALCOHOL: WOULD NEVER DOZE
HOW LIKELY ARE YOU TO NOD OFF OR FALL ASLEEP WHEN YOU ARE A PASSENGER IN A CAR FOR AN HOUR WITHOUT A BREAK: WOULD NEVER DOZE
ESS TOTAL SCORE: 3
HOW LIKELY ARE YOU TO NOD OFF OR FALL ASLEEP WHILE SITTING AND TALKING TO SOMEONE: WOULD NEVER DOZE
HOW LIKELY ARE YOU TO NOD OFF OR FALL ASLEEP WHILE SITTING INACTIVE IN A PUBLIC PLACE: SLIGHT CHANCE OF DOZING
HOW LIKELY ARE YOU TO NOD OFF OR FALL ASLEEP WHILE LYING DOWN TO REST IN THE AFTERNOON WHEN CIRCUMSTANCES PERMIT: WOULD NEVER DOZE

## 2024-03-22 NOTE — TELEPHONE ENCOUNTER
From: Leonela Freeman  To: Dr. Andrade Rodrigues  Sent: 3/22/2024 8:54 AM EDT  Subject: BV and Yeast    Hi Dr. Rodrigues, is there anyway you can send in a prescription for BV and Yeast. I can’t get into my gyno until April 11.

## 2024-03-22 NOTE — PROGRESS NOTES
mouth.    Reviewed and analyzed results of physiologic download from patient's machine and reviewed with patient.  Supplies and parts as needed for her machine.  These are medically necessary.      2. Morbid obesity:  Assessment & Plan:  Chronic-not stable:   Lost 45 pounds after the weight loss, but gained 40 pounds back again.   The proportionality between weight and AHI.  With 10% weight change, the AHI has a 27% proportionate change.  With 20% weight change, the AHI has a 45-50% proportionate change.         No orders of the defined types were placed in this encounter.      Return in about 2 years (around 3/22/2026) for Reveiwing CPAP usage and compliance report and tro.    Maurilio Arnold MD  Medical Director - [facility]

## 2024-03-22 NOTE — PROGRESS NOTES
Leonela Freeman         : 1995   [facility]     []  [facility]      [] [facility]     [facility]    [] [facility]  [x] [facility]  [] [facility]   [] [facility] [] Other:  Diagnosis: [x] ALPHONSO (G47.33) [] CSA (G47.31) [] Apnea (G47.30)   Length of Need: [] 12 Months [x] 99 Months [] Other:    Machine (ALEXEI!): [] [x] ResMed AirSense     Auto [] Other:       Humidifier: [x] Heated ()        [x] Water chamber replacement ()/ 1 per 6 months        Mask:  Please always start with the mask the patient used during the titraion   [x] Nasal () /1 per 3 months    [x] Patient choice -Size and fit mask    [] Dispense: P10 airfit    [x] Headgear () / 1 per 6 months        [x] Replacement Nasal Pillows ()/2 per month         Tubing: [x] Heated ()/1 per 3 months    [] Standard ()/1 per 3 months [] Other:           Filters: [x] Non-disposable ()/1 per 6 months     [x] Ultra-Fine, Disposable ()/2 per month        Miscellaneous: [x] Chin Strap ()/ 1 per 6 months [] O2 bleed-in:       LPM   [] Oximetry on CPAP/Bilevel []  Other:          Start Order Date: 24    MEDICAL JUSTIFICATION:  I, the undersigned, certify that the above prescribed supplies are medically necessary for this patient’s wellbeing.  In my opinion, the supplies are both reasonable and necessary in reference to accepted standards of medicalpractice in treatment of this patient’s condition.    Maurilio Arnold MD      NPI: [id number]       Order Signed Date: 24    Electronically signed by Maurilio Arnold MD on 3/22/2024 at 12:06 PM

## 2024-04-01 ENCOUNTER — PATIENT MESSAGE (OUTPATIENT)
Dept: PRIMARY CARE CLINIC | Age: 29
End: 2024-04-01

## 2024-04-01 RX ORDER — FLUCONAZOLE 150 MG/1
150 TABLET ORAL
Qty: 2 TABLET | Refills: 0 | Status: SHIPPED | OUTPATIENT
Start: 2024-04-01 | End: 2024-04-07

## 2024-04-01 NOTE — TELEPHONE ENCOUNTER
From: Leonela Freeman  To: Dr. Andrade Rodrigues  Sent: 4/1/2024 1:47 PM EDT  Subject: Yeast Infection     Hi Dr. Rodrigues, can you send in a prescription for yeast I did the flagyl for BV but I also need one for yeast I always get one after I take flagyl

## 2024-04-10 ENCOUNTER — PATIENT MESSAGE (OUTPATIENT)
Dept: PRIMARY CARE CLINIC | Age: 29
End: 2024-04-10

## 2024-04-10 NOTE — TELEPHONE ENCOUNTER
From: Leonela Freeman  To: Dr. Andrade Rodrigues  Sent: 4/10/2024 2:12 PM EDT  Subject: HS Flair    Hi Dr. Rodrigues I hate to message you again but I have a horrible HS flair under my right arm and I’m waiting on the dermatologist to get me in for a steroid kenalog shot. Do you offer these in office? If not are you able to send me in a prescription for an antibiotic I am having a low grade fever with chills.

## 2024-04-13 RX ORDER — AMOXICILLIN 500 MG/1
500 CAPSULE ORAL 2 TIMES DAILY
Qty: 20 CAPSULE | Refills: 0 | Status: SHIPPED | OUTPATIENT
Start: 2024-04-13 | End: 2024-04-23

## 2024-05-14 ENCOUNTER — PATIENT MESSAGE (OUTPATIENT)
Dept: PRIMARY CARE CLINIC | Age: 29
End: 2024-05-14

## 2024-05-14 NOTE — TELEPHONE ENCOUNTER
From: Leonela Freeman  To: Dr. Andrade Rodrigues  Sent: 5/14/2024 3:30 PM EDT  Subject: Medical Records    Hi Dr. Rodrigues! [facility] and [facility] are awaiting your response for medical records and other documentation. Can you confirm you have received what you needed to complete this ASAP. Thank you.

## 2024-08-14 ENCOUNTER — TELEPHONE (OUTPATIENT)
Dept: PRIMARY CARE CLINIC | Age: 29
End: 2024-08-14

## 2024-08-18 ENCOUNTER — PATIENT MESSAGE (OUTPATIENT)
Dept: PRIMARY CARE CLINIC | Age: 29
End: 2024-08-18

## 2024-08-21 RX ORDER — AMOXICILLIN AND CLAVULANATE POTASSIUM 875; 125 MG/1; MG/1
1 TABLET, FILM COATED ORAL 2 TIMES DAILY
Qty: 20 TABLET | Refills: 0 | Status: SHIPPED | OUTPATIENT
Start: 2024-08-21 | End: 2024-08-31

## 2024-09-04 NOTE — PROGRESS NOTES
Leonela Freeman         : 1995  [] [facility]     [] [facility]      [] [facility]     [facility]    [] [facility]  [x] [facility]  [] [facility]   [] [facility] [] Other:  Diagnosis: [x] ALPHONSO (G47.33) [] CSA (G47.31) [] Apnea (G47.30)   Length of Need: [] 12 Months [x] 99 Months [] Other:    Machine (ALEXEI!):  [x] ResMed AirSense     Auto [] Other:         Humidifier: [x] Heated ()        [x] Water chamber replacement ()/ 1 per 6 months        Mask:  Please always start with the mask the patient used during the titraion   [x] Nasal () /1 per 3 months    [x] Patient choice -Size and fit mask    [] Dispense:     [x] Headgear () / 1 per 6 months    [x] Replacement Nasal Cushion ()/2 per month             Tubing: [x] Heated ()/1 per 3 months    [] Standard ()/1 per 3 months [] Other:           Filters: [x] Non-disposable ()/1 per 6 months     [x] Ultra-Fine, Disposable ()/2 per month        Miscellaneous: [x] Chin Strap ()/ 1 per 6 months [] O2 bleed-in:       LPM   [] Oximetry on CPAP/Bilevel []  Other:          Start Order Date: 24    MEDICAL JUSTIFICATION:  I, the undersigned, certify that the above prescribed supplies are medically necessary for this patient’s wellbeing.  In my opinion, the supplies are both reasonable and necessary in reference to accepted standards of medicalpractice in treatment of this patient’s condition.    Maurilio Arnold MD      NPI: [id number]       Order Signed Date: 24    Electronically signed by Maurilio Arnold MD on 2024 at 8:15 AM

## 2024-09-09 DIAGNOSIS — G47.33 OSA (OBSTRUCTIVE SLEEP APNEA): Primary | ICD-10-CM

## 2024-09-11 ENCOUNTER — TELEPHONE (OUTPATIENT)
Dept: PRIMARY CARE CLINIC | Age: 29
End: 2024-09-11

## 2024-09-12 ENCOUNTER — OFFICE VISIT (OUTPATIENT)
Dept: PRIMARY CARE CLINIC | Age: 29
End: 2024-09-12

## 2024-09-12 VITALS
DIASTOLIC BLOOD PRESSURE: 81 MMHG | SYSTOLIC BLOOD PRESSURE: 127 MMHG | RESPIRATION RATE: 18 BRPM | HEART RATE: 104 BPM | BODY MASS INDEX: 41.2 KG/M2 | WEIGHT: 279 LBS

## 2024-09-12 DIAGNOSIS — G89.29 CHRONIC BUTTOCK PAIN: ICD-10-CM

## 2024-09-12 DIAGNOSIS — M79.18 CHRONIC BUTTOCK PAIN: ICD-10-CM

## 2024-09-12 DIAGNOSIS — N62 MACROMASTIA: Primary | ICD-10-CM

## 2024-09-12 DIAGNOSIS — Z23 NEED FOR INFLUENZA VACCINATION: ICD-10-CM

## 2024-09-12 DIAGNOSIS — E66.01 MORBID OBESITY WITH BMI OF 40.0-44.9, ADULT (HCC): ICD-10-CM

## 2024-09-12 RX ORDER — VENLAFAXINE HYDROCHLORIDE 150 MG/1
150 CAPSULE, EXTENDED RELEASE ORAL NIGHTLY
COMMUNITY

## 2024-09-12 RX ORDER — SEMAGLUTIDE 1.34 MG/ML
0.5 INJECTION, SOLUTION SUBCUTANEOUS WEEKLY
Qty: 4 ADJUSTABLE DOSE PRE-FILLED PEN SYRINGE | Refills: 3 | Status: SHIPPED | OUTPATIENT
Start: 2024-09-12

## 2024-09-12 SDOH — ECONOMIC STABILITY: FOOD INSECURITY: WITHIN THE PAST 12 MONTHS, THE FOOD YOU BOUGHT JUST DIDN'T LAST AND YOU DIDN'T HAVE MONEY TO GET MORE.: NEVER TRUE

## 2024-09-12 SDOH — ECONOMIC STABILITY: INCOME INSECURITY: HOW HARD IS IT FOR YOU TO PAY FOR THE VERY BASICS LIKE FOOD, HOUSING, MEDICAL CARE, AND HEATING?: NOT VERY HARD

## 2024-09-12 SDOH — ECONOMIC STABILITY: FOOD INSECURITY: WITHIN THE PAST 12 MONTHS, YOU WORRIED THAT YOUR FOOD WOULD RUN OUT BEFORE YOU GOT MONEY TO BUY MORE.: NEVER TRUE

## 2024-09-12 ASSESSMENT — ENCOUNTER SYMPTOMS
CONSTIPATION: 0
DIARRHEA: 0
ABDOMINAL PAIN: 0
BLOOD IN STOOL: 0
BACK PAIN: 1
SHORTNESS OF BREATH: 0

## 2024-09-13 ENCOUNTER — PATIENT MESSAGE (OUTPATIENT)
Dept: PRIMARY CARE CLINIC | Age: 29
End: 2024-09-13

## 2024-09-13 ENCOUNTER — TELEPHONE (OUTPATIENT)
Dept: ADMINISTRATIVE | Age: 29
End: 2024-09-13

## 2024-10-10 ENCOUNTER — PATIENT MESSAGE (OUTPATIENT)
Dept: PRIMARY CARE CLINIC | Age: 29
End: 2024-10-10

## 2024-10-10 RX ORDER — FLUTICASONE PROPIONATE 50 MCG
1 SPRAY, SUSPENSION (ML) NASAL DAILY
Qty: 16 G | Refills: 0 | Status: SHIPPED | OUTPATIENT
Start: 2024-10-10

## 2024-11-06 ENCOUNTER — PATIENT MESSAGE (OUTPATIENT)
Dept: PRIMARY CARE CLINIC | Age: 29
End: 2024-11-06

## 2024-11-07 RX ORDER — LORATADINE 10 MG/1
10 TABLET ORAL DAILY
Qty: 30 TABLET | Refills: 0 | Status: SHIPPED | OUTPATIENT
Start: 2024-11-07 | End: 2024-12-07

## 2025-01-21 ENCOUNTER — PATIENT MESSAGE (OUTPATIENT)
Dept: PRIMARY CARE CLINIC | Age: 30
End: 2025-01-21

## 2025-05-22 ENCOUNTER — APPOINTMENT (OUTPATIENT)
Dept: CT IMAGING | Age: 30
End: 2025-05-22
Payer: COMMERCIAL

## 2025-05-22 ENCOUNTER — HOSPITAL ENCOUNTER (EMERGENCY)
Age: 30
Discharge: HOME | End: 2025-05-22
Attending: INTERNAL MEDICINE
Payer: COMMERCIAL

## 2025-05-22 ENCOUNTER — APPOINTMENT (OUTPATIENT)
Dept: GENERAL RADIOLOGY | Age: 30
End: 2025-05-22
Payer: COMMERCIAL

## 2025-05-22 VITALS
SYSTOLIC BLOOD PRESSURE: 178 MMHG | DIASTOLIC BLOOD PRESSURE: 96 MMHG | RESPIRATION RATE: 25 BRPM | BODY MASS INDEX: 42.95 KG/M2 | HEART RATE: 68 BPM | WEIGHT: 290 LBS | HEIGHT: 69 IN | OXYGEN SATURATION: 100 % | TEMPERATURE: 98.6 F

## 2025-05-22 DIAGNOSIS — R42 EPISODIC LIGHTHEADEDNESS: ICD-10-CM

## 2025-05-22 DIAGNOSIS — R07.9 CHEST PAIN, UNSPECIFIED TYPE: Primary | ICD-10-CM

## 2025-05-22 DIAGNOSIS — R03.0 ELEVATED BLOOD PRESSURE READING WITHOUT DIAGNOSIS OF HYPERTENSION: ICD-10-CM

## 2025-05-22 LAB
ALBUMIN SERPL-MCNC: 3.2 G/DL (ref 3.4–5)
ALBUMIN/GLOB SERPL: 1 {RATIO} (ref 1.1–2.2)
ALP SERPL-CCNC: 104 U/L (ref 40–129)
ALT SERPL-CCNC: 10 U/L (ref 10–40)
ANION GAP SERPL CALCULATED.3IONS-SCNC: 13 MMOL/L (ref 3–16)
AST SERPL-CCNC: 13 U/L (ref 15–37)
BACTERIA URNS QL MICRO: ABNORMAL /HPF
BASOPHILS # BLD: 0 K/UL (ref 0–0.2)
BASOPHILS NFR BLD: 0.6 %
BILIRUB SERPL-MCNC: 0.6 MG/DL (ref 0–1)
BILIRUB UR QL STRIP.AUTO: NEGATIVE
BUN SERPL-MCNC: 5 MG/DL (ref 7–20)
CALCIUM SERPL-MCNC: 8.5 MG/DL (ref 8.3–10.6)
CHLORIDE SERPL-SCNC: 103 MMOL/L (ref 99–110)
CLARITY UR: CLEAR
CO2 SERPL-SCNC: 22 MMOL/L (ref 21–32)
COLOR UR: YELLOW
CREAT SERPL-MCNC: 0.5 MG/DL (ref 0.6–1.1)
DEPRECATED RDW RBC AUTO: 19.8 % (ref 12.4–15.4)
EOSINOPHIL # BLD: 0.1 K/UL (ref 0–0.6)
EOSINOPHIL NFR BLD: 1.2 %
EPI CELLS #/AREA URNS AUTO: 3 /HPF (ref 0–5)
GFR SERPLBLD CREATININE-BSD FMLA CKD-EPI: >90 ML/MIN/{1.73_M2}
GLUCOSE SERPL-MCNC: 94 MG/DL (ref 70–99)
GLUCOSE UR STRIP.AUTO-MCNC: NEGATIVE MG/DL
HCT VFR BLD AUTO: 28.6 % (ref 36–48)
HGB BLD-MCNC: 8.9 G/DL (ref 12–16)
HGB UR QL STRIP.AUTO: ABNORMAL
HYALINE CASTS #/AREA URNS AUTO: 0 /LPF (ref 0–8)
KETONES UR STRIP.AUTO-MCNC: 40 MG/DL
LEUKOCYTE ESTERASE UR QL STRIP.AUTO: ABNORMAL
LYMPHOCYTES # BLD: 0.9 K/UL (ref 1–5.1)
LYMPHOCYTES NFR BLD: 15.3 %
MAGNESIUM SERPL-MCNC: 2.27 MG/DL (ref 1.8–2.4)
MCH RBC QN AUTO: 21.5 PG (ref 26–34)
MCHC RBC AUTO-ENTMCNC: 31.2 G/DL (ref 31–36)
MCV RBC AUTO: 68.9 FL (ref 80–100)
MONOCYTES # BLD: 0.7 K/UL (ref 0–1.3)
MONOCYTES NFR BLD: 11.7 %
NEUTROPHILS # BLD: 4 K/UL (ref 1.7–7.7)
NEUTROPHILS NFR BLD: 71.2 %
NITRITE UR QL STRIP.AUTO: NEGATIVE
NT-PROBNP SERPL-MCNC: 406 PG/ML (ref 0–124)
PATH INTERP BLD-IMP: NO
PH UR STRIP.AUTO: 8 [PH] (ref 5–8)
PLATELET # BLD AUTO: 256 K/UL (ref 135–450)
PMV BLD AUTO: 7.5 FL (ref 5–10.5)
POTASSIUM SERPL-SCNC: 3.5 MMOL/L (ref 3.5–5.1)
PROT SERPL-MCNC: 6.3 G/DL (ref 6.4–8.2)
PROT UR STRIP.AUTO-MCNC: ABNORMAL MG/DL
RBC # BLD AUTO: 4.15 M/UL (ref 4–5.2)
RBC CLUMPS #/AREA URNS AUTO: 122 /HPF (ref 0–4)
SODIUM SERPL-SCNC: 138 MMOL/L (ref 136–145)
SP GR UR STRIP.AUTO: 1.01 (ref 1–1.03)
TROPONIN, HIGH SENSITIVITY: 8 NG/L (ref 0–14)
TROPONIN, HIGH SENSITIVITY: <6 NG/L (ref 0–14)
UA COMPLETE W REFLEX CULTURE PNL UR: YES
UA DIPSTICK W REFLEX MICRO PNL UR: YES
URN SPEC COLLECT METH UR: ABNORMAL
UROBILINOGEN UR STRIP-ACNC: 1 E.U./DL
WBC # BLD AUTO: 5.7 K/UL (ref 4–11)
WBC #/AREA URNS AUTO: 17 /HPF (ref 0–5)

## 2025-05-22 PROCEDURE — 85025 COMPLETE CBC W/AUTO DIFF WBC: CPT

## 2025-05-22 PROCEDURE — 2500000003 HC RX 250 WO HCPCS: Performed by: PHYSICIAN ASSISTANT

## 2025-05-22 PROCEDURE — 6370000000 HC RX 637 (ALT 250 FOR IP): Performed by: PHYSICIAN ASSISTANT

## 2025-05-22 PROCEDURE — 99285 EMERGENCY DEPT VISIT HI MDM: CPT

## 2025-05-22 PROCEDURE — 71045 X-RAY EXAM CHEST 1 VIEW: CPT

## 2025-05-22 PROCEDURE — 6370000000 HC RX 637 (ALT 250 FOR IP): Performed by: INTERNAL MEDICINE

## 2025-05-22 PROCEDURE — 71260 CT THORAX DX C+: CPT

## 2025-05-22 PROCEDURE — 93005 ELECTROCARDIOGRAM TRACING: CPT | Performed by: INTERNAL MEDICINE

## 2025-05-22 PROCEDURE — 6360000002 HC RX W HCPCS: Performed by: PHYSICIAN ASSISTANT

## 2025-05-22 PROCEDURE — 83880 ASSAY OF NATRIURETIC PEPTIDE: CPT

## 2025-05-22 PROCEDURE — 6360000004 HC RX CONTRAST MEDICATION: Performed by: PHYSICIAN ASSISTANT

## 2025-05-22 PROCEDURE — 96374 THER/PROPH/DIAG INJ IV PUSH: CPT

## 2025-05-22 PROCEDURE — 87086 URINE CULTURE/COLONY COUNT: CPT

## 2025-05-22 PROCEDURE — 84484 ASSAY OF TROPONIN QUANT: CPT

## 2025-05-22 PROCEDURE — 81001 URINALYSIS AUTO W/SCOPE: CPT

## 2025-05-22 PROCEDURE — 2580000003 HC RX 258: Performed by: PHYSICIAN ASSISTANT

## 2025-05-22 PROCEDURE — 83735 ASSAY OF MAGNESIUM: CPT

## 2025-05-22 PROCEDURE — 96375 TX/PRO/DX INJ NEW DRUG ADDON: CPT

## 2025-05-22 PROCEDURE — 80053 COMPREHEN METABOLIC PANEL: CPT

## 2025-05-22 RX ORDER — LISINOPRIL 10 MG/1
10 TABLET ORAL ONCE
Status: COMPLETED | OUTPATIENT
Start: 2025-05-22 | End: 2025-05-22

## 2025-05-22 RX ORDER — HYDROXYZINE PAMOATE 25 MG/1
50 CAPSULE ORAL ONCE
Status: COMPLETED | OUTPATIENT
Start: 2025-05-22 | End: 2025-05-22

## 2025-05-22 RX ORDER — LISINOPRIL 10 MG/1
10 TABLET ORAL DAILY
Qty: 90 TABLET | Refills: 1 | Status: SHIPPED | OUTPATIENT
Start: 2025-05-22

## 2025-05-22 RX ORDER — ENALAPRILAT 1.25 MG/ML
1.25 INJECTION INTRAVENOUS ONCE
Status: DISCONTINUED | OUTPATIENT
Start: 2025-05-22 | End: 2025-05-22

## 2025-05-22 RX ORDER — LORAZEPAM 1 MG/1
1 TABLET ORAL ONCE
Status: COMPLETED | OUTPATIENT
Start: 2025-05-22 | End: 2025-05-22

## 2025-05-22 RX ORDER — IOPAMIDOL 755 MG/ML
75 INJECTION, SOLUTION INTRAVASCULAR
Status: COMPLETED | OUTPATIENT
Start: 2025-05-22 | End: 2025-05-22

## 2025-05-22 RX ORDER — DIPHENHYDRAMINE HYDROCHLORIDE 50 MG/ML
25 INJECTION, SOLUTION INTRAMUSCULAR; INTRAVENOUS ONCE
Status: COMPLETED | OUTPATIENT
Start: 2025-05-22 | End: 2025-05-22

## 2025-05-22 RX ADMIN — DIPHENHYDRAMINE HYDROCHLORIDE 25 MG: 50 INJECTION INTRAMUSCULAR; INTRAVENOUS at 19:25

## 2025-05-22 RX ADMIN — FAMOTIDINE 20 MG: 10 INJECTION, SOLUTION INTRAVENOUS at 19:24

## 2025-05-22 RX ADMIN — HYDROXYZINE PAMOATE 50 MG: 25 CAPSULE ORAL at 17:46

## 2025-05-22 RX ADMIN — LORAZEPAM 1 MG: 1 TABLET ORAL at 20:41

## 2025-05-22 RX ADMIN — IOPAMIDOL 75 ML: 755 INJECTION, SOLUTION INTRAVENOUS at 19:36

## 2025-05-22 RX ADMIN — WATER 80 MG: 1 INJECTION INTRAMUSCULAR; INTRAVENOUS; SUBCUTANEOUS at 19:23

## 2025-05-22 RX ADMIN — LISINOPRIL 10 MG: 10 TABLET ORAL at 22:28

## 2025-05-22 ASSESSMENT — ENCOUNTER SYMPTOMS
VOMITING: 0
CHEST TIGHTNESS: 0
NAUSEA: 0
ABDOMINAL PAIN: 0
DIARRHEA: 1
COUGH: 0
SHORTNESS OF BREATH: 0

## 2025-05-22 ASSESSMENT — PAIN DESCRIPTION - LOCATION: LOCATION: CHEST

## 2025-05-22 ASSESSMENT — PAIN - FUNCTIONAL ASSESSMENT: PAIN_FUNCTIONAL_ASSESSMENT: 0-10

## 2025-05-22 ASSESSMENT — HEART SCORE: ECG: NORMAL

## 2025-05-22 ASSESSMENT — PAIN SCALES - GENERAL: PAINLEVEL_OUTOF10: 5

## 2025-05-22 NOTE — ED PROVIDER NOTES
patient is NOT to be included for SEP-1 Core Measure due to:  Infection is not suspected    Patient was given the following medications:  Medications   lisinopril (PRINIVIL;ZESTRIL) tablet 10 mg (has no administration in time range)   hydrOXYzine pamoate (VISTARIL) capsule 50 mg (50 mg Oral Given 25)   methylPREDNISolone sodium succ (SOLU-MEDROL) 80 mg in sterile water 1.28 mL injection (80 mg IntraVENous Given 25)   famotidine (PEPCID) 20 MG/2ML 20 mg in sodium chloride (PF) 0.9 % 10 mL injection (20 mg IntraVENous Given 25)   diphenhydrAMINE (BENADRYL) injection 25 mg (25 mg IntraVENous Given 25)   iopamidol (ISOVUE-370) 76 % injection 75 mL (75 mLs IntraVENous Given 25)   LORazepam (ATIVAN) tablet 1 mg (1 mg Oral Given 25)             Chronic Conditions affecting care: None   has a past medical history of Allergic rhinitis, Anxiety, Asthma, Chronic GERD (2021), Chronic GERD (2021), Diabetes mellitus (), Hidradenitis, History of prediabetes (), Iron deficiency anemia, Menorrhagia, Migraine, Obesity, Obstructive sleep apnea, Pre-operative clearance (10/27/2020), and Prediabetes (2020).    CONSULTS: (Who and What was discussed)  None      Records Reviewed (External, Source and Summary) None    CC/HPI Summary, DDx, ED Course, and Reassessment: Patient with a history of prediabetes, GERD and anxiety presented to the emergency department today with multiple complaints.  Patient states she had a  5 days ago on 2025 at [facility].  Her OB is Dr. Lester.  Patient is  Ab1.  She states her only complication during pregnancy was gestational diabetes which was diet-controlled.  She states for the last 3 days she has been experiencing chest pain and episodic lightheadedness.  She describes her chest pain as a tight, intermittent, 4/10 pain that localizes to her mid sternum and occasionally radiates to her back.  She  denies shortness of breath, heart palpitations or diaphoresis.  She states she has had leg edema in the latter part of her pregnancy and since her  but denies this being any worse than normal.  She also reports some acid reflux which is also not abnormal for her.  She states she has had mild diarrhea without abdominal pain, nausea or vomiting.  She has no urinary complaints.  She denies fever or chills.    Patient is resting comfortably and in no obvious distress.  She is hypertensive as high as 170/104.  She is not tachycardic, tachypneic or hypoxemic.  She has 2+ bilateral lower extremity pitting edema which she states has been normal throughout her bladder pregnancy.    EKG completed shows no signs of acute ischemia or infarction.  Initial and delta troponins are negative.  proBNP is only 406 and chest x-ray shows no acute cardiopulmonary disease.    CBC is without leukocytosis.  Anemia is improving with hemoglobin up to 8.9 and hematocrit 28.6.  BMP is unremarkable.  LFTs with a mild hypoproteinemia of 6.3 and hypoalbuminemia of 3.2.  Magnesium normal at 2.27.  Urine analysis with moderate leukocytes, 17 WBC and 122 RBC.  Patient is not having any urinary symptoms and still has lochia.  I believe this is contaminated and will be sent for culture.    CT PE shows no evidence of pulmonary embolism or acute pulmonary abnormality.    Patient was premedicated prior to CT scan secondary to itching after previous IV contrast.  Patient admits to somewhat severe medical anxiety and was given Vistaril followed by a dose of Ativan.  Patient given lisinopril 10 mg p.o.    Disposition Considerations (tests considered but not done, Admit vs D/C, Shared Decision Making, Pt Expectation of Test or Tx.): We had a lengthy discussion with the patient regarding testing completed in the emergency department today as well as the results.  Patient has a heart score of 1.  Her workup today is very reassuring.  She is

## 2025-05-22 NOTE — ED PROVIDER NOTES
In addition to the advanced practice provider, I personally saw Leonela NOBLE Freeman and performed a substantive portion of the visit including all aspects of the medical decision making.    Medical Decision Making  The-year-old female presents to the ER with multiple complaints.  Patient had a  5 days ago at [facility]  Over the past 3 days she has been having chest pain and lightheadedness.  She does not describe vertigo or spinning of the room.  The chest pain is described as tight and intermittent.  Occasionally, it radiates to her back.  The pain is moderate in intensity.  Patient has mild diarrhea, nausea and vomiting.  Patient denies fever and chills heart has a regular rhythm and rate.  Lungs are clear bilaterally.  Patient does have 2 pitting edema bilaterally in the lower extremities from the feet to just below the ankles bilaterally.  Hemoglobin is 8.9.  Urine does show evidence of UTI.  BNP is 406.  Chest x-ray did not show any acute findings.  CT of the chest did not show evidence of a PE but did have trace bilateral pleural effusions.  There is no evidence of CHF.  Heart does not appear to be enlarged.  Patient is not in respiratory distress.  Patient is not hypoxic.  Patient is nontoxic and nonseptic in appearance.  Patient is stable for discharge.  I agree with the assessment and plan of PA.  Patient is stable for discharge and encouraged to return if symptoms worsen or change.        EKG  The Ekg interpreted by me in the absence of a cardiologist shows.  normal sinus rhythm with a rate of 72  Axis is   Normal  QTc is  normal  Intervals and Durations are unremarkable.      No specific ST-T wave changes appreciated.  No evidence of acute ischemia.   No significant change from prior EKG dated 2023    SEP-1  Is this patient to be included in the SEP-1 Core Measure due to severe sepsis or septic shock?   no    Screenings     [place] Coma Scale  Eye Opening: Spontaneous  Best Verbal

## 2025-05-22 NOTE — ED NOTES
This RN to bedside to pre medicate patient for ordered CT scan, patient asked if she could \"deny the CT scan\" then stated she would like to talk to the doctor prior to receiving the CT scan.     Dr. Montez made aware of patient request.

## 2025-05-23 ENCOUNTER — OFFICE VISIT (OUTPATIENT)
Dept: CARDIOLOGY CLINIC | Age: 30
End: 2025-05-23

## 2025-05-23 ENCOUNTER — PATIENT MESSAGE (OUTPATIENT)
Dept: PRIMARY CARE CLINIC | Age: 30
End: 2025-05-23

## 2025-05-23 VITALS
BODY MASS INDEX: 43.39 KG/M2 | WEIGHT: 293 LBS | OXYGEN SATURATION: 99 % | SYSTOLIC BLOOD PRESSURE: 144 MMHG | HEART RATE: 81 BPM | DIASTOLIC BLOOD PRESSURE: 86 MMHG

## 2025-05-23 DIAGNOSIS — E66.09 CLASS 2 OBESITY DUE TO EXCESS CALORIES WITHOUT SERIOUS COMORBIDITY WITH BODY MASS INDEX (BMI) OF 38.0 TO 38.9 IN ADULT: ICD-10-CM

## 2025-05-23 DIAGNOSIS — I20.89 OTHER FORMS OF ANGINA PECTORIS: ICD-10-CM

## 2025-05-23 DIAGNOSIS — N92.0 MENORRHAGIA WITH REGULAR CYCLE: ICD-10-CM

## 2025-05-23 DIAGNOSIS — K21.9 CHRONIC GERD: ICD-10-CM

## 2025-05-23 DIAGNOSIS — E66.812 CLASS 2 OBESITY DUE TO EXCESS CALORIES WITHOUT SERIOUS COMORBIDITY WITH BODY MASS INDEX (BMI) OF 38.0 TO 38.9 IN ADULT: ICD-10-CM

## 2025-05-23 DIAGNOSIS — G47.33 OBSTRUCTIVE SLEEP APNEA: ICD-10-CM

## 2025-05-23 DIAGNOSIS — R00.2 PALPITATIONS: ICD-10-CM

## 2025-05-23 DIAGNOSIS — R06.02 SOB (SHORTNESS OF BREATH): ICD-10-CM

## 2025-05-23 DIAGNOSIS — D50.0 IRON DEFICIENCY ANEMIA DUE TO CHRONIC BLOOD LOSS: ICD-10-CM

## 2025-05-23 DIAGNOSIS — I49.3 PVC (PREMATURE VENTRICULAR CONTRACTION): ICD-10-CM

## 2025-05-23 DIAGNOSIS — E66.01 MORBID OBESITY WITH BMI OF 40.0-44.9, ADULT (HCC): ICD-10-CM

## 2025-05-23 DIAGNOSIS — J45.20 MILD INTERMITTENT ASTHMA WITHOUT COMPLICATION: ICD-10-CM

## 2025-05-23 DIAGNOSIS — Z98.84 S/P LAPAROSCOPIC SLEEVE GASTRECTOMY: ICD-10-CM

## 2025-05-23 LAB
BACTERIA UR CULT: NORMAL
EKG ATRIAL RATE: 72 BPM
EKG DIAGNOSIS: NORMAL
EKG P AXIS: 6 DEGREES
EKG P-R INTERVAL: 158 MS
EKG Q-T INTERVAL: 364 MS
EKG QRS DURATION: 78 MS
EKG QTC CALCULATION (BAZETT): 398 MS
EKG R AXIS: 3 DEGREES
EKG T AXIS: 26 DEGREES
EKG VENTRICULAR RATE: 72 BPM

## 2025-05-23 PROCEDURE — 93010 ELECTROCARDIOGRAM REPORT: CPT | Performed by: INTERNAL MEDICINE

## 2025-05-23 NOTE — ED NOTES
Pt discharged in stable condition, AVS and follow up care reviewed,IV removed, all questions answered.

## 2025-05-23 NOTE — PROGRESS NOTES
[facility]     Outpatient Follow Up Note  Lorena Ugalde RN, APRN,CNP     CHIEF COMPLAINT / HPI:  Leonela Freeman is 29 y.o. female who presents today with  recent ED visit fearing she has post partum in ED,   Pt of Dr Yeboah, with recent ED visit with cp with ALPHONSO  obesity,  hx of sinus tachycardia laparoscopic sleeve gastrectomy prediabetes, GERD, and anxiety.   C section a TCH 25          Today b/p mildly elevated, <1+ edema noted lungs clear, occas chest tightness at rest last seconds no radiation of pain no fever could cold rare h/a,  no n/v diarrhea    She wants a TTE she has researched post partum cardiomyopathy she feels anxious with hx anxiety    On Effexor, states she isn't breast feeding       2025 CT lung  1. No evidence of pulmonary embolism or acute pulmonary abnormality.  2. Trace pleural effusions    25 EKG in ED   The Ekg interpreted by me in the absence of a cardiologist shows.  normal sinus rhythm with a rate of 72  Axis is   Normal  QTc is  normal  Intervals and Durations are unremarkable.      No specific ST-T wave changes appreciated.  No evidence of acute ischemia.   No significant change from prior EKG dated 2023  Holter  worn low burden PVC PAC   2023 echo stress Baseline resting echocardiogram shows normal global LV systolic function with an ejection fraction of 60% with a septal bounce noted. Following stress there was uniform augmentation of all myocardial segments with appropriate hyperdynamic LV systolic response to stress. Patients SP02 at rest was 97% and was 98% at peak stress. ECG Normal (Negative) response to exercise. Symptoms No symptoms with exercise.      Past Medical History:   Diagnosis Date    Allergic rhinitis     Anxiety     Asthma     Chronic GERD 2021    Chronic GERD 2021    Diabetes mellitus (HCC)     Hidradenitis     History of prediabetes     Iron deficiency anemia     Menorrhagia     Migraine

## 2025-05-27 ENCOUNTER — TELEPHONE (OUTPATIENT)
Dept: CARDIOLOGY CLINIC | Age: 30
End: 2025-05-27

## (undated) DEVICE — BW-412T DISP COMBO CLEANING BRUSH: Brand: SINGLE USE COMBINATION CLEANING BRUSH

## (undated) DEVICE — TROCAR: Brand: KII FIOS FIRST ENTRY

## (undated) DEVICE — 30977 SEE SHARP - ENHANCED INTRAOPERATIVE LAPAROSCOPE CLEANING & DEFOGGING: Brand: 30977 SEE SHARP - ENHANCED INTRAOPERATIVE LAPAROSCOPE CLEANING & DEFOGGING

## (undated) DEVICE — SOLUTION IRRIG 1000ML 0.9% SOD CHL USP POUR PLAS BTL

## (undated) DEVICE — SUTURE VCRL PLUS SZ 0 54IN TIE VCP608H

## (undated) DEVICE — STERILE POLYISOPRENE POWDER-FREE SURGICAL GLOVES: Brand: PROTEXIS

## (undated) DEVICE — RELOAD STPL 2.5MM L60MM 0DEG VASC TISS TAN TI 6 ROW LIN

## (undated) DEVICE — VALVE SUCTION AIR H2O SET ORCA POD + DISP

## (undated) DEVICE — ARM CRADLE: Brand: DEVON

## (undated) DEVICE — APPLICATOR PREP 26ML 0.7% IOD POVACRYLEX 74% ISO ALC ST

## (undated) DEVICE — RELOAD STPL 3.5MM L60MM 0DEG UNIV TISS PUR TI 6 ROW LIN

## (undated) DEVICE — ENDOSCOPIC KIT 6X3/16 FT COLON W/ 1.1 OZ 2 GWN W/O BRSH

## (undated) DEVICE — SYRINGE MED 10ML TRNSLUC BRL PLUNG BLK MRK POLYPR CTRL

## (undated) DEVICE — AIR/WATER CLEANING ADAPTER FOR OLYMPUS® GI ENDOSCOPE: Brand: BULLDOG®

## (undated) DEVICE — BLANKET WRM W29.9XL79.1IN UP BODY FORC AIR MISTRAL-AIR

## (undated) DEVICE — SUTURE VCRL + SZ 4-0 L18IN ABSRB UD L19MM PS-2 3/8 CIR PRIM VCP496H

## (undated) DEVICE — GOWN,AURORA,NONREINF,RAGLAN,XXL,STERILE: Brand: MEDLINE

## (undated) DEVICE — TRUE CONTENT TO BE POPULATED AS PART OF REBRANDING: Brand: ARGYLE

## (undated) DEVICE — TROCAR: Brand: KII OPTICAL ACCESS SYSTEM

## (undated) DEVICE — SHEET,DRAPE,53X77,STERILE: Brand: MEDLINE

## (undated) DEVICE — DEVICE SUT SHFT L34CM DIA 10MM 2 JAW LD UNIT ENDOSTCH

## (undated) DEVICE — AIR SHEET,LAT,COMFORT GLIDE, BLEND 40X80: Brand: MEDLINE

## (undated) DEVICE — TROCARS: Brand: KII® OPTICAL ACCESS SYSTEM

## (undated) DEVICE — SPONGE,LAP,4"X18",XR,ST,5/PK,40PK/CS: Brand: MEDLINE INDUSTRIES, INC.

## (undated) DEVICE — MERCY FAIRFIELD TURNOVER KIT: Brand: MEDLINE INDUSTRIES, INC.

## (undated) DEVICE — FORCEPS BX L240CM WRK CHN 2.8MM STD CAP W/ NDL MIC MESH

## (undated) DEVICE — SHEARS ENDOSCP HARM 36CM ULTRASONIC CRV TIP UPGRD

## (undated) DEVICE — BOWL MED L 32OZ PLAS W/ MOLD GRAD EZ OPN PEEL PCH

## (undated) DEVICE — NEEDLE INSUF L150MM DIA2MM DISP FOR PNEUMOPERI ENDOPATH

## (undated) DEVICE — PASSIVE LAPSCP FLTR W/ 1/4INX24 TBNG AND M LUER LCK FIT - U

## (undated) DEVICE — ADHESIVE SKIN CLSR 0.7ML TOP DERMBND ADV

## (undated) DEVICE — NEEDLE HYPO 22GA L1.5IN BLK POLYPR HUB S STL REG BVL STR

## (undated) DEVICE — SHEET,DRAPE,40X58,STERILE: Brand: MEDLINE

## (undated) DEVICE — LAPAROSCOPIC SCISSORS: Brand: EPIX LAPAROSCOPIC SCISSORS

## (undated) DEVICE — CRADLE ANK AND FT ELEV FLAT END POLY FOAM W/ VENT H NEUT

## (undated) DEVICE — DRAPE,LAP,CHOLE,W/TROUGHS,STERILE: Brand: MEDLINE

## (undated) DEVICE — LAPAROSCOPY PACK: Brand: MEDLINE INDUSTRIES, INC.

## (undated) DEVICE — LOTION PREP REMV 5OZ IODO CLR TINC OF BENZ DURAPREP

## (undated) DEVICE — SHELL STPL PWR FOR SIGNIA HNDL STPL SYS

## (undated) DEVICE — DEVICE SUT W/ SZ 0 L48IN VLT POLYSRB SUT DISP ES-9 ENDO

## (undated) DEVICE — MOUTHPIECE ENDOSCP L CTRL OPN AND SIDE PORTS DISP

## (undated) DEVICE — SOLUTION IV IRRIG WATER 500ML POUR BRL ST 2F7113